# Patient Record
Sex: FEMALE | Race: WHITE | NOT HISPANIC OR LATINO | Employment: OTHER | URBAN - METROPOLITAN AREA
[De-identification: names, ages, dates, MRNs, and addresses within clinical notes are randomized per-mention and may not be internally consistent; named-entity substitution may affect disease eponyms.]

---

## 2017-02-06 RX ORDER — DOXAZOSIN MESYLATE 4 MG/1
TABLET ORAL
Qty: 90 TABLET | Refills: 0 | Status: SHIPPED | OUTPATIENT
Start: 2017-02-06 | End: 2017-05-08 | Stop reason: SDUPTHER

## 2017-02-20 ENCOUNTER — CLINICAL SUPPORT NO REQUIREMENTS (OUTPATIENT)
Dept: CARDIOLOGY | Facility: CLINIC | Age: 82
End: 2017-02-20

## 2017-02-20 DIAGNOSIS — I49.5 SINOATRIAL NODE DYSFUNCTION (HCC): Primary | ICD-10-CM

## 2017-02-20 PROCEDURE — 93280 PM DEVICE PROGR EVAL DUAL: CPT | Performed by: INTERNAL MEDICINE

## 2017-03-07 RX ORDER — OLMESARTAN MEDOXOMIL AND HYDROCHLOROTHIAZIDE 40/25 40; 25 MG/1; MG/1
TABLET ORAL
Qty: 90 TABLET | Refills: 0 | Status: SHIPPED | OUTPATIENT
Start: 2017-03-07 | End: 2017-05-22 | Stop reason: SDUPTHER

## 2017-03-07 RX ORDER — METOPROLOL SUCCINATE 50 MG/1
TABLET, EXTENDED RELEASE ORAL
Qty: 90 TABLET | Refills: 0 | Status: SHIPPED | OUTPATIENT
Start: 2017-03-07 | End: 2017-05-22 | Stop reason: SDUPTHER

## 2017-03-28 ENCOUNTER — OFFICE VISIT (OUTPATIENT)
Dept: INTERNAL MEDICINE | Facility: CLINIC | Age: 82
End: 2017-03-28

## 2017-03-28 VITALS
OXYGEN SATURATION: 97 % | BODY MASS INDEX: 24.39 KG/M2 | HEIGHT: 59 IN | DIASTOLIC BLOOD PRESSURE: 60 MMHG | WEIGHT: 121 LBS | SYSTOLIC BLOOD PRESSURE: 142 MMHG | HEART RATE: 76 BPM

## 2017-03-28 DIAGNOSIS — I34.0 MODERATE MITRAL REGURGITATION: Chronic | ICD-10-CM

## 2017-03-28 DIAGNOSIS — E78.5 HYPERLIPIDEMIA, UNSPECIFIED HYPERLIPIDEMIA TYPE: Chronic | ICD-10-CM

## 2017-03-28 DIAGNOSIS — M85.80 OSTEOPENIA: Chronic | ICD-10-CM

## 2017-03-28 DIAGNOSIS — Z95.0 HISTORY OF PERMANENT CARDIAC PACEMAKER PLACEMENT: Chronic | ICD-10-CM

## 2017-03-28 DIAGNOSIS — I25.10 OCCLUSIVE CORONARY ARTERY DISEASE: Chronic | ICD-10-CM

## 2017-03-28 DIAGNOSIS — M17.0 PRIMARY OSTEOARTHRITIS OF BOTH KNEES: Chronic | ICD-10-CM

## 2017-03-28 DIAGNOSIS — I10 BENIGN ESSENTIAL HYPERTENSION: Chronic | ICD-10-CM

## 2017-03-28 DIAGNOSIS — E03.8 SUBCLINICAL HYPOTHYROIDISM: ICD-10-CM

## 2017-03-28 DIAGNOSIS — Z78.9 STATIN INTOLERANCE: Chronic | ICD-10-CM

## 2017-03-28 DIAGNOSIS — R73.01 IMPAIRED FASTING GLUCOSE: Primary | Chronic | ICD-10-CM

## 2017-03-28 DIAGNOSIS — Z51.81 THERAPEUTIC DRUG MONITORING: ICD-10-CM

## 2017-03-28 DIAGNOSIS — Z86.79 HISTORY OF COMPLETE AV BLOCK: Chronic | ICD-10-CM

## 2017-03-28 DIAGNOSIS — Z98.890 STATUS POST CATHETER ABLATION OF ATRIAL FLUTTER: Chronic | ICD-10-CM

## 2017-03-28 DIAGNOSIS — Z86.79 HISTORY OF ATRIAL FLUTTER: Chronic | ICD-10-CM

## 2017-03-28 DIAGNOSIS — E55.9 VITAMIN D DEFICIENCY: Chronic | ICD-10-CM

## 2017-03-28 DIAGNOSIS — M15.9 GENERALIZED OSTEOARTHRITIS OF MULTIPLE SITES: Chronic | ICD-10-CM

## 2017-03-28 PROBLEM — Z23 ENCOUNTER FOR ZOSTAVAX ADMINISTRATION: Status: ACTIVE | Noted: 2017-03-28

## 2017-03-28 PROCEDURE — 99214 OFFICE O/P EST MOD 30 MIN: CPT | Performed by: INTERNAL MEDICINE

## 2017-03-29 ENCOUNTER — RESULTS ENCOUNTER (OUTPATIENT)
Dept: INTERNAL MEDICINE | Facility: CLINIC | Age: 82
End: 2017-03-29

## 2017-03-29 DIAGNOSIS — E78.5 HYPERLIPIDEMIA, UNSPECIFIED HYPERLIPIDEMIA TYPE: Chronic | ICD-10-CM

## 2017-03-29 DIAGNOSIS — Z51.81 THERAPEUTIC DRUG MONITORING: ICD-10-CM

## 2017-03-29 DIAGNOSIS — R73.01 IMPAIRED FASTING GLUCOSE: Chronic | ICD-10-CM

## 2017-03-29 DIAGNOSIS — E55.9 VITAMIN D DEFICIENCY: Chronic | ICD-10-CM

## 2017-03-29 DIAGNOSIS — E03.8 SUBCLINICAL HYPOTHYROIDISM: ICD-10-CM

## 2017-05-08 RX ORDER — DOXAZOSIN MESYLATE 4 MG/1
TABLET ORAL
Qty: 90 TABLET | Refills: 0 | Status: SHIPPED | OUTPATIENT
Start: 2017-05-08 | End: 2017-07-31 | Stop reason: SDUPTHER

## 2017-05-10 RX ORDER — CLOTRIMAZOLE AND BETAMETHASONE DIPROPIONATE 10; .64 MG/G; MG/G
CREAM TOPICAL
Qty: 45 G | Refills: 1 | Status: SHIPPED | OUTPATIENT
Start: 2017-05-10 | End: 2017-12-11

## 2017-05-22 RX ORDER — METOPROLOL SUCCINATE 50 MG/1
TABLET, EXTENDED RELEASE ORAL
Qty: 90 TABLET | Refills: 0 | Status: SHIPPED | OUTPATIENT
Start: 2017-05-22 | End: 2017-09-05 | Stop reason: SDUPTHER

## 2017-05-22 RX ORDER — OLMESARTAN MEDOXOMIL AND HYDROCHLOROTHIAZIDE 40/25 40; 25 MG/1; MG/1
TABLET ORAL
Qty: 90 TABLET | Refills: 0 | Status: SHIPPED | OUTPATIENT
Start: 2017-05-22 | End: 2017-09-05 | Stop reason: SDUPTHER

## 2017-05-31 ENCOUNTER — CLINICAL SUPPORT NO REQUIREMENTS (OUTPATIENT)
Dept: CARDIOLOGY | Facility: CLINIC | Age: 82
End: 2017-05-31

## 2017-05-31 DIAGNOSIS — I49.5 SINOATRIAL NODE DYSFUNCTION (HCC): Primary | ICD-10-CM

## 2017-05-31 PROCEDURE — 93296 REM INTERROG EVL PM/IDS: CPT | Performed by: INTERNAL MEDICINE

## 2017-05-31 PROCEDURE — 93294 REM INTERROG EVL PM/LDLS PM: CPT | Performed by: INTERNAL MEDICINE

## 2017-07-07 ENCOUNTER — OFFICE VISIT (OUTPATIENT)
Dept: INTERNAL MEDICINE | Facility: CLINIC | Age: 82
End: 2017-07-07

## 2017-07-07 VITALS
SYSTOLIC BLOOD PRESSURE: 134 MMHG | DIASTOLIC BLOOD PRESSURE: 54 MMHG | HEART RATE: 80 BPM | BODY MASS INDEX: 24.23 KG/M2 | HEIGHT: 59 IN | WEIGHT: 120.2 LBS | OXYGEN SATURATION: 96 %

## 2017-07-07 DIAGNOSIS — Z00.00 INITIAL MEDICARE ANNUAL WELLNESS VISIT: Primary | ICD-10-CM

## 2017-07-07 DIAGNOSIS — J06.9 ACUTE UPPER RESPIRATORY INFECTION: ICD-10-CM

## 2017-07-07 PROCEDURE — 99212 OFFICE O/P EST SF 10 MIN: CPT | Performed by: INTERNAL MEDICINE

## 2017-07-07 PROCEDURE — G0438 PPPS, INITIAL VISIT: HCPCS | Performed by: INTERNAL MEDICINE

## 2017-07-07 NOTE — PROGRESS NOTES
07/07/2017    Patient Information  Lora Serrano                                                                                          [id number] [facility] [address]      7/15/1926  [phone]      Chief Complaint:     Follow-up urgent care visit for upper respiratory tract infection.  Initial Medicare wellness visit.    History of Present Illness:    Patient with a history of subclinical hypothyroidism, occlusive coronary artery disease, impaired fasting glucose, osteopenia, hyperlipidemia, hypertension, history of atrial flutter, history of permanent cardiac pacemaker, primary osteoarthritis of both knees.  She presents today to follow-up on an urgent care visit for complaints consistent with a viral upper respiratory tract infection.  This will be described in detail below.  Her symptoms are better.  She also needs her initial Medicare wellness visit.  Past medical history extensively reviewed and updated where necessary including health maintenance parameters.  This reveals patient is up-to-date.    The history regarding recent viral upper respiratory tract infection:    07/07/2017--patient seen in follow-up by Dr. Mix.  She reports she feels much better and her cough is resolved.  However, she still continues to have some phlegm in her throat that is not discolored.  Examination reveals clear lungs.  I have recommended she try Mucinex over-the-counter and drink plenty of fluids.    06/19/2017--patient was evaluated at the urgent care with a 5 day history of malaise, cough, headache, and fatigue.  She took some Cheratussin which provided some relief.  She was diagnosed as having a viral upper respiratory tract infection and prescription for Cheratussin given.    Review of Systems   Constitution: Negative.   HENT: Negative.         Phlegm in throat   Eyes: Negative.    Cardiovascular: Negative.    Respiratory: Negative.    Endocrine: Negative.    Hematologic/Lymphatic: Negative.     Skin: Negative.    Musculoskeletal: Negative.    Gastrointestinal: Negative.    Genitourinary: Negative.    Neurological: Negative.    Psychiatric/Behavioral: Negative.    Allergic/Immunologic: Negative.        Active Problems:    Patient Active Problem List   Diagnosis   • Acute upper respiratory infection   • Occlusive coronary artery disease, 12/11/2000--status post 5 vessel CABG   • Subclinical hypothyroidism   • Impaired fasting glucose   • Onychomycosis of fingernails   • Osteopenia, 01/31/2011--lumbar spine -2.2, left hip -1.0.   • Vitamin D deficiency   • Hyperlipidemia   • Benign essential hypertension   • Generalized osteoarthritis of multiple sites   • Therapeutic drug monitoring   • History of atrial flutter, 2003--successful catheter ablation for persistent atrial flutter.   • History of complete AV block, 2000--dual-chamber PM for SSS, A flutter, AVB after CABG. 2006--PM replacement.  Medtronic EnRhythm [id number]. 11/30/2015--PM generator change.   • Moderate mitral regurgitation, 02/18/2012--moderate MR, EF 58%.   • Status post catheter ablation of atrial flutter, 2002--successful catheter ablation for persistent atrial flutter.   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 2006--PM replacement.  Medtronic EnRhythm [id number]. 11/30/2015--PM generator change.   • Primary osteoarthritis of both knees   • Statin intolerance         Past Medical History:   Diagnosis Date   • History of acute bronchitis 02/09/2015 02/09/2015--patient presents with a 5 or 6 day history of chest congestion, cough productive of yellow/green phlegm that has cleared some over the past day or so. No significant worsening shortness of breath. She has had some chills but no documented fever. Not much in the way of ENT symptoms. Examination reveals mild bilateral rhonchi. No definite areas of consolidation. Levaquin 500 mg by mouth    • History of acute pharyngitis 10/16/2015     10/16/2015--patient presents with approximately 5 day history of sore throat, posterior nasal drainage, cough productive of scant clear phlegm.  No fever, chills, shortness of breath or other systemic signs or symptoms.  She has fatigue but no myalgias.  Examination reveals boggy nasal mucosa.  No tenderness over the sinuses.  Her pharynx is erythematous but there is no exudate.  Neck without nano   • History of anemia 03/11/2015 03/11/2015--CBC is normal with a hemoglobin of 12.2, hematocrit normal at 37.0.   04/25/2014--homocystine and methylmalonic acid are normal. Iron studies are normal, except iron saturation is low at 15%. Observation.   04/14/2014--CBC revealed a low hemoglobin of 11.5, hematocrit low at 34.7. Homocystine, methylmalonic acid, iron studies ordered. Patient is to followup on the phone for the results   • History of atrial flutter, 2003--successful catheter ablation for persistent atrial flutter. 2003 2003--catheter ablation for persistent atrial flutter was successful.   • History of bone density study 01/31/2011 01/31/2011--DEXA scan revealed LS spine T score -2.2, left hip -1.0. Now osteopenia. 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   • History of carotid Doppler/vascular screen. 01/22/2008 01/22/2008--vascular screen was negative for carotid artery plaque, abdominal aorta normal, no PAD.   • History of carpal tunnel syndrome, bilateral 11/19/2009 11/19/2009--nerve conduction study of the upper extremities revealed bilateral carpal tunnel syndrome. January 2010--status post right carpal tunnel release. No surgical treatment on left.   • History of chest x-ray 9/28/2016 09/06/2016--chest x-ray performed by the urgent care for complaints of cough and shortness of breath.  This was compared to previous x-ray 02/15/2014 and is unchanged and reveals no acute disease.   • History of complete AV block, 10/26/2006--permanent pacemaker replacement.   12/18/2000--dual-chamber pacemaker placement for sick sinus syndrome, atrial flutter, AV block after CABG. 12/18/2000 12/18/2000--dual-chamber pacemaker placement for sick sinus syndrome, atrial flutter, AV block that occurred after her bypass surgery. 10/26/2006--status post pacemaker replacement. Medtronic EnRhythm [id number]   • History of drug rash, 01/11/2014--name brand Synthroid 01/11/2014 01/11/2014--patient presented with a diffuse erythematous rash that was very pruritic. Examination consistent with a drug reaction/allergic dermatitis. Believed to be secondary to name brand Synthroid. This was discontinued and patient treated with a Medrol Dosepak.   • History of echocardiogram 01/18/2012 01/18/2012 revealed ejection fraction of 58%, moderate mitral regurgitation.   • History of mammogram 09/10/2003    09/10/2003--normal mammogram.   • History of osteoporosis, June 2001 osteoporosis of the LS spine.  2011--improved to osteopenia only. 2011,2001 01/31/2011--DEXA scan  revealed LS spine T score -2.2, left hip -1.0.  Now osteopenia.   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   • History of pneumococcal vaccination 03/16/2015 03/16/2015--Prevnar 13 given. Patient's initial pneumococcal vaccination was given after the age of 65 and therefore no further pneumococcal immunization is required. 12/14/2007--Pneumovax given. 12/10/2002--pneumococcal vaccination given.   • History of Zostavax administration 1/17/2008 01/17/2008--Zostavax given at the local drug store.   • Questionable History of polymyalgia rheumatica --    This diagnosis somewhat suspect   • Status post catheter ablation of atrial flutter, 2002--successful catheter ablation for persistent atrial flutter. 2003 2003--catheter ablation for persistent atrial flutter was successful.         Past Surgical History:   Procedure Laterality Date   • CARDIAC ABLATION  07/02/2003 07/02/2003--catheter ablation for persistent  atrial flutter was successful.   • CARDIAC PACEMAKER PLACEMENT  12/18/2000 12/18/2000--dual-chamber pacemaker placement for sick sinus syndrome, atrial flutter, AV block that occurred after her bypass surgery.   • CARPAL TUNNEL RELEASE Right 01/2010 January 2010--status post right carpal tunnel release. No surgical treatment on left.   • CORONARY ARTERY BYPASS GRAFT  12/2000 December 2000--coronary artery bypass x 5   • HX OVARIAN CYSTECTOMY  Remote    Remote ovarian cyst excision.   • INCISIONAL BREAST BIOPSY  Remote    Benign   • PACEMAKER REPLACEMENT  10/26/2006    10/26/2006--status post pacemaker replacement. Medtronic EnRhythm [id number]    • PACEMAKER REPLACEMENT  11/30/2015 11/30/2015--pacemaker generator change.         Allergies   Allergen Reactions   • Atorvastatin    • Levothyroxine Sodium    • Other      Seafood   • Penicillins            Current Outpatient Prescriptions:   •  aspirin 81 MG tablet, Take 1 tablet by mouth daily., Disp: , Rfl:   •  Cholecalciferol (VITAMIN D) 1000 UNITS tablet, Take 1 tablet by mouth daily., Disp: , Rfl:   •  clotrimazole-betamethasone (LOTRISONE) 1-0.05 % cream, APPLY SPARINGLY TO AFFECTED AREA(S) THREE TIMES A DAY, Disp: 45 g, Rfl: 1  •  Coenzyme Q10 (COQ10) 400 MG capsule, Take 1 capsule by mouth daily. With food, Disp: , Rfl:   •  doxazosin (CARDURA) 4 MG tablet, TAKE ONE TABLET BY MOUTH EVERY MORNING, Disp: 90 tablet, Rfl: 0  •  metoprolol succinate XL (TOPROL-XL) 50 MG 24 hr tablet, TAKE ONE TABLET BY MOUTH DAILY, Disp: 90 tablet, Rfl: 0  •  olmesartan-hydrochlorothiazide (BENICAR HCT) 40-25 MG per tablet, TAKE ONE TABLET BY MOUTH DAILY FOR BLOOD PRESSURE, Disp: 90 tablet, Rfl: 0  •  Red Yeast Rice 600 MG tablet, Take 2 tablets by mouth every day., Disp: , Rfl:       Family History   Problem Relation Age of Onset   • Hyperlipidemia Mother    • Breast cancer Sister    • Diabetes type II Sister          Social History     Social History   • Marital status:  "     Spouse name: N/A   • Number of children: N/A   • Years of education: N/A     Occupational History   • Retired      Social History Main Topics   • Smoking status: Never Smoker   • Smokeless tobacco: Never Used   • Alcohol use No   • Drug use: No   • Sexual activity: Not Currently     Partners: Male     Other Topics Concern   • Not on file     Social History Narrative         Vitals:    07/07/17 1518   BP: 134/54   Pulse: 80   SpO2: 96%   Weight: 120 lb 3.2 oz (54.5 kg)   Height: 59\" (149.9 cm)          Physical Exam:    General: Alert and oriented x 3.  No acute distress.  Normal affect.  HEENT: Pupils equal, round, reactive to light; extraocular movements intact; sclerae nonicteric; pharynx, ear canals and TMs normal.  Neck: Without JVD, thyromegaly, bruit, or adenopathy.  Lungs: Clear to auscultation in all fields.  Heart: Regular rate and rhythm without murmur, rub, gallop, or click.  Abdomen: Soft, nontender, without hepatosplenomegaly or hernia.  Bowel sounds normal.  : Deferred.  Rectal: Deferred.  Extremities: Without clubbing, cyanosis, edema, or pulse deficit.  Neurologic: Intact without focal deficit.  Normal station and gait observed during ingress and egress from the examination room.  Skin: Without significant lesion.  Musculoskeletal: Unremarkable.      Lab/other results:    I reviewed the evaluation performed at the urgent care.    Assessment/Plan:     Diagnosis Plan   1. Initial Medicare annual wellness visit     2. Acute upper respiratory infection         The initial Medicare wellness visit is documented on a separate note.  Patient presents in follow-up for a history consistent with acute viral upper respiratory tract infection.  Her symptoms are definitely improving although not totally resolved.  Primary complaint is that of phlegm in her throat.    Plan is as follows: I have recommended over-the-counter Mucinex and plenty of fluids.  Patient should follow-up if her symptoms persist " and certainly if they worsen.  Otherwise, she can keep previously scheduled follow-up appointment this fall.          Procedures

## 2017-07-07 NOTE — PROGRESS NOTES
QUICK REFERENCE INFORMATION:  The ABCs of the Annual Wellness Visit    Initial Medicare Wellness Visit    HEALTH RISK ASSESSMENT    7/15/1926    Recent Hospitalizations:  No hospitalization(s) within the last year..        Current Medical Providers:  Patient Care Team:  Adilson Mix MD as PCP - General (Internal Medicine)  Willis Wallace MD as PCP - Claims Attributed        Smoking Status:  History   Smoking Status   • Never Smoker   Smokeless Tobacco   • Never Used       Alcohol Consumption:  History   Alcohol Use No       Depression Screen:   PHQ-9 Depression Screening 7/7/2017   Little interest or pleasure in doing things 0   Feeling down, depressed, or hopeless 0   Trouble falling or staying asleep, or sleeping too much 0   Feeling tired or having little energy 0   Poor appetite or overeating 1   Feeling bad about yourself - or that you are a failure or have let yourself or your family down 0   Trouble concentrating on things, such as reading the newspaper or watching television 0   Moving or speaking so slowly that other people could have noticed. Or the opposite - being so fidgety or restless that you have been moving around a lot more than usual 0   Thoughts that you would be better off dead, or of hurting yourself in some way 0   PHQ-9 Total Score 1   If you checked off any problems, how difficult have these problems made it for you to do your work, take care of things at home, or get along with other people? Not difficult at all       Health Habits and Functional and Cognitive Screening:  Functional & Cognitive Status 7/7/2017   Do you have difficulty preparing food and eating? No   Do you have difficulty bathing yourself? Yes   Do you have difficulty getting dressed? No   Do you have difficulty using the toilet? No   Do you have difficulty moving around from place to place? Yes   In the past year have you fallen or experienced a near fall? No   Do you need help using the phone?  No   Are you deaf or  do you have serious difficulty hearing?  Yes   Do you need help with transportation? Yes   Do you need help shopping? No   Do you need help preparing meals?  No   Do you need help with housework?  No   Do you need help with laundry? No   Do you need help taking your medications? No   Do you need help managing money? No   Do you have difficulty concentrating, remembering or making decisions? No       Health Habits  Current Diet: Well Balanced Diet  Dental Exam: Not up to date  Eye Exam: Not up to date  Exercise (times per week): 7 times per week  Current Exercise Activities Include: Walking          Does the patient have evidence of cognitive impairment? No    Asiprin use counseling: Taking ASA appropriately as indicated      Recent Lab Results:    Visual Acuity:  No exam data present    Age-appropriate Screening Schedule:  Refer to the list below for future screening recommendations based on patient's age, sex and/or medical conditions. Orders for these recommended tests are listed in the plan section. The patient has been provided with a written plan.    Health Maintenance   Topic Date Due   • INFLUENZA VACCINE  08/01/2017   • LIPID PANEL  03/21/2018   • MAMMOGRAM  03/28/2019   • DXA SCAN  03/28/2019   • TDAP/TD VACCINES (2 - Td) 03/28/2027   • PNEUMOCOCCAL VACCINES (65+ LOW/MEDIUM RISK)  Completed   • ZOSTER VACCINE  Completed        Subjective   History of Present Illness    Lora Serrano is a 89 y.o. female who presents for an Annual Wellness Visit.    The following portions of the patient's history were reviewed and updated as appropriate: allergies, current medications, past family history, past medical history, past social history, past surgical history and problem list.    Outpatient Medications Prior to Visit   Medication Sig Dispense Refill   • aspirin 81 MG tablet Take 1 tablet by mouth daily.     • Cholecalciferol (VITAMIN D) 1000 UNITS tablet Take 1 tablet by mouth daily.     •  clotrimazole-betamethasone (LOTRISONE) 1-0.05 % cream APPLY SPARINGLY TO AFFECTED AREA(S) THREE TIMES A DAY 45 g 1   • Coenzyme Q10 (COQ10) 400 MG capsule Take 1 capsule by mouth daily. With food     • doxazosin (CARDURA) 4 MG tablet TAKE ONE TABLET BY MOUTH EVERY MORNING 90 tablet 0   • metoprolol succinate XL (TOPROL-XL) 50 MG 24 hr tablet TAKE ONE TABLET BY MOUTH DAILY 90 tablet 0   • olmesartan-hydrochlorothiazide (BENICAR HCT) 40-25 MG per tablet TAKE ONE TABLET BY MOUTH DAILY FOR BLOOD PRESSURE 90 tablet 0   • Red Yeast Rice 600 MG tablet Take 2 tablets by mouth every day.     • guaifenesin-codeine (CHERATUSSIN AC) 100-10 MG/5ML liquid 10 ml po Q4H prn cough 118 mL 1     No facility-administered medications prior to visit.        Patient Active Problem List   Diagnosis   • Acute upper respiratory infection   • Occlusive coronary artery disease, 12/11/2000--status post 5 vessel CABG   • Subclinical hypothyroidism   • Impaired fasting glucose   • Onychomycosis of fingernails   • Osteopenia, 01/31/2011--lumbar spine -2.2, left hip -1.0.   • Vitamin D deficiency   • Hyperlipidemia   • Benign essential hypertension   • Generalized osteoarthritis of multiple sites   • Therapeutic drug monitoring   • History of atrial flutter, 2003--successful catheter ablation for persistent atrial flutter.   • History of complete AV block, 2000--dual-chamber PM for SSS, A flutter, AVB after CABG. 2006--PM replacement.  Medtronic EnRhythm [id number]. 11/30/2015--PM generator change.   • Moderate mitral regurgitation, 02/18/2012--moderate MR, EF 58%.   • Status post catheter ablation of atrial flutter, 2002--successful catheter ablation for persistent atrial flutter.   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 2006--PM replacement.  Medtronic EnRhythm [id number]. 11/30/2015--PM generator change.   • Primary osteoarthritis of both knees   • Statin intolerance       Advance Care  "Planning:  has NO advance directive - not interested in additional information    Identification of Risk Factors:  Risk factors include: cardiovascular risk.    Review of Systems   Musculoskeletal: Positive for arthralgias.   All other systems reviewed and are negative.      Compared to one year ago, the patient feels her physical health is the same.  Compared to one year ago, the patient feels her mental health is the same.    Objective       Physical Exam    General: Alert and oriented x 3.  No acute distress.  Normal affect.  HEENT: Pupils equal, round, reactive to light; extraocular movements intact; sclerae nonicteric; pharynx, ear canals and TMs normal.  Neck: Without JVD, thyromegaly, bruit, or adenopathy.  Lungs: Clear to auscultation in all fields.  Heart: Regular rate and rhythm without murmur, rub, gallop, or click.  Abdomen: Soft, nontender, without hepatosplenomegaly or hernia.  Bowel sounds normal.  : Deferred.  Rectal: Deferred.  Extremities: Without clubbing, cyanosis, edema, or pulse deficit.  Neurologic: Intact without focal deficit.  Normal station and gait observed during ingress and egress from the examination room.  Skin: Without significant lesion.  Musculoskeletal: Unremarkable.    Vitals:    07/07/17 1518   BP: 134/54   Pulse: 80   SpO2: 96%   Weight: 120 lb 3.2 oz (54.5 kg)   Height: 59\" (149.9 cm)   PainSc: 0-No pain       Body mass index is 24.28 kg/(m^2).  Discussed the patient's BMI with her. The BMI is in the acceptable range.    Assessment/Plan   Patient Self-Management and Personalized Health Advice  The patient has been provided with information about: exercise and fall prevention and preventive services including:   · Exercise counseling provided, Fall Risk assessment done, Glaucoma screening recommended.    Visit Diagnoses:    ICD-10-CM ICD-9-CM   1. Initial Medicare annual wellness visit Z00.00 V70.0   2. Acute upper respiratory infection J06.9 465.9       No orders of the " defined types were placed in this encounter.      Outpatient Encounter Prescriptions as of 7/7/2017   Medication Sig Dispense Refill   • aspirin 81 MG tablet Take 1 tablet by mouth daily.     • Cholecalciferol (VITAMIN D) 1000 UNITS tablet Take 1 tablet by mouth daily.     • clotrimazole-betamethasone (LOTRISONE) 1-0.05 % cream APPLY SPARINGLY TO AFFECTED AREA(S) THREE TIMES A DAY 45 g 1   • Coenzyme Q10 (COQ10) 400 MG capsule Take 1 capsule by mouth daily. With food     • doxazosin (CARDURA) 4 MG tablet TAKE ONE TABLET BY MOUTH EVERY MORNING 90 tablet 0   • metoprolol succinate XL (TOPROL-XL) 50 MG 24 hr tablet TAKE ONE TABLET BY MOUTH DAILY 90 tablet 0   • olmesartan-hydrochlorothiazide (BENICAR HCT) 40-25 MG per tablet TAKE ONE TABLET BY MOUTH DAILY FOR BLOOD PRESSURE 90 tablet 0   • Red Yeast Rice 600 MG tablet Take 2 tablets by mouth every day.     • [DISCONTINUED] guaifenesin-codeine (CHERATUSSIN AC) 100-10 MG/5ML liquid 10 ml po Q4H prn cough 118 mL 1     No facility-administered encounter medications on file as of 7/7/2017.        Reviewed use of high risk medication in the elderly: yes  Reviewed for potential of harmful drug interactions in the elderly: yes    Follow Up:  No Follow-up on file.     An After Visit Summary and PPPS with all of these plans were given to the patient.

## 2017-07-31 RX ORDER — DOXAZOSIN MESYLATE 4 MG/1
TABLET ORAL
Qty: 90 TABLET | Refills: 0 | Status: SHIPPED | OUTPATIENT
Start: 2017-07-31 | End: 2017-11-04 | Stop reason: SDUPTHER

## 2017-09-05 RX ORDER — OLMESARTAN MEDOXOMIL AND HYDROCHLOROTHIAZIDE 40/25 40; 25 MG/1; MG/1
TABLET ORAL
Qty: 90 TABLET | Refills: 0 | Status: SHIPPED | OUTPATIENT
Start: 2017-09-05 | End: 2017-11-28 | Stop reason: SDUPTHER

## 2017-09-05 RX ORDER — METOPROLOL SUCCINATE 50 MG/1
TABLET, EXTENDED RELEASE ORAL
Qty: 90 TABLET | Refills: 0 | Status: SHIPPED | OUTPATIENT
Start: 2017-09-05 | End: 2017-11-28 | Stop reason: SDUPTHER

## 2017-09-08 ENCOUNTER — CLINICAL SUPPORT NO REQUIREMENTS (OUTPATIENT)
Dept: CARDIOLOGY | Facility: CLINIC | Age: 82
End: 2017-09-08

## 2017-09-08 DIAGNOSIS — I49.5 SICK SINUS SYNDROME (HCC): Primary | ICD-10-CM

## 2017-09-08 PROCEDURE — 93280 PM DEVICE PROGR EVAL DUAL: CPT | Performed by: INTERNAL MEDICINE

## 2017-10-28 LAB
25(OH)D3+25(OH)D2 SERPL-MCNC: 55.4 NG/ML (ref 30–100)
ALBUMIN SERPL-MCNC: 4.2 G/DL (ref 3.5–5.2)
ALBUMIN/GLOB SERPL: 1.8 G/DL
ALP SERPL-CCNC: 71 U/L (ref 39–117)
ALT SERPL-CCNC: 15 U/L (ref 1–33)
AST SERPL-CCNC: 19 U/L (ref 1–32)
BILIRUB SERPL-MCNC: 0.4 MG/DL (ref 0.1–1.2)
BUN SERPL-MCNC: 12 MG/DL (ref 8–23)
BUN/CREAT SERPL: 15.4 (ref 7–25)
CALCIUM SERPL-MCNC: 9.5 MG/DL (ref 8.2–9.6)
CHLORIDE SERPL-SCNC: 94 MMOL/L (ref 98–107)
CHOLEST SERPL-MCNC: 222 MG/DL (ref 100–199)
CO2 SERPL-SCNC: 26.9 MMOL/L (ref 22–29)
CREAT SERPL-MCNC: 0.78 MG/DL (ref 0.57–1)
ERYTHROCYTE [DISTWIDTH] IN BLOOD BY AUTOMATED COUNT: 14 % (ref 11.7–13)
GFR SERPLBLD CREATININE-BSD FMLA CKD-EPI: 69 ML/MIN/1.73
GFR SERPLBLD CREATININE-BSD FMLA CKD-EPI: 84 ML/MIN/1.73
GLOBULIN SER CALC-MCNC: 2.3 GM/DL
GLUCOSE SERPL-MCNC: 95 MG/DL (ref 65–99)
HBA1C MFR BLD: 5.57 % (ref 4.8–5.6)
HCT VFR BLD AUTO: 38.1 % (ref 35.6–45.5)
HDL SERPL-SCNC: 30.8 UMOL/L
HDLC SERPL-MCNC: 53 MG/DL
HGB BLD-MCNC: 12.2 G/DL (ref 11.9–15.5)
LDL SERPL QN: 21.2 NM
LDL SERPL-SCNC: 1852 NMOL/L
LDL SMALL SERPL-SCNC: 780 NMOL/L
LDLC SERPL CALC-MCNC: 146 MG/DL (ref 0–99)
MCH RBC QN AUTO: 28.6 PG (ref 26.9–32)
MCHC RBC AUTO-ENTMCNC: 32 G/DL (ref 32.4–36.3)
MCV RBC AUTO: 89.2 FL (ref 80.5–98.2)
PLATELET # BLD AUTO: 200 10*3/MM3 (ref 140–500)
POTASSIUM SERPL-SCNC: 4.3 MMOL/L (ref 3.5–5.2)
PROT SERPL-MCNC: 6.5 G/DL (ref 6–8.5)
RBC # BLD AUTO: 4.27 10*6/MM3 (ref 3.9–5.2)
SODIUM SERPL-SCNC: 133 MMOL/L (ref 136–145)
T3FREE SERPL-MCNC: 3.1 PG/ML (ref 2–4.4)
T4 FREE SERPL-MCNC: 1.35 NG/DL (ref 0.93–1.7)
TRIGL SERPL-MCNC: 117 MG/DL (ref 0–149)
TSH SERPL DL<=0.005 MIU/L-ACNC: 2.38 MIU/ML (ref 0.27–4.2)
WBC # BLD AUTO: 5.65 10*3/MM3 (ref 4.5–10.7)

## 2017-11-02 ENCOUNTER — OFFICE VISIT (OUTPATIENT)
Dept: INTERNAL MEDICINE | Facility: CLINIC | Age: 82
End: 2017-11-02

## 2017-11-02 VITALS
HEART RATE: 74 BPM | WEIGHT: 115 LBS | OXYGEN SATURATION: 98 % | BODY MASS INDEX: 23.18 KG/M2 | SYSTOLIC BLOOD PRESSURE: 136 MMHG | HEIGHT: 59 IN | DIASTOLIC BLOOD PRESSURE: 54 MMHG

## 2017-11-02 DIAGNOSIS — Z23 NEED FOR INFLUENZA VACCINATION: ICD-10-CM

## 2017-11-02 DIAGNOSIS — Z86.79 HISTORY OF COMPLETE AV BLOCK: Chronic | ICD-10-CM

## 2017-11-02 DIAGNOSIS — Z98.890 STATUS POST CATHETER ABLATION OF ATRIAL FLUTTER: Chronic | ICD-10-CM

## 2017-11-02 DIAGNOSIS — R73.01 IMPAIRED FASTING GLUCOSE: Primary | Chronic | ICD-10-CM

## 2017-11-02 DIAGNOSIS — I25.10 OCCLUSIVE CORONARY ARTERY DISEASE: Chronic | ICD-10-CM

## 2017-11-02 DIAGNOSIS — I10 BENIGN ESSENTIAL HYPERTENSION: Chronic | ICD-10-CM

## 2017-11-02 DIAGNOSIS — Z78.9 STATIN INTOLERANCE: Chronic | ICD-10-CM

## 2017-11-02 DIAGNOSIS — E78.5 HYPERLIPIDEMIA, UNSPECIFIED HYPERLIPIDEMIA TYPE: Chronic | ICD-10-CM

## 2017-11-02 DIAGNOSIS — E55.9 VITAMIN D DEFICIENCY: Chronic | ICD-10-CM

## 2017-11-02 DIAGNOSIS — M85.89 OSTEOPENIA OF MULTIPLE SITES: Chronic | ICD-10-CM

## 2017-11-02 DIAGNOSIS — Z51.81 THERAPEUTIC DRUG MONITORING: ICD-10-CM

## 2017-11-02 DIAGNOSIS — Z86.79 HISTORY OF ATRIAL FLUTTER: Chronic | ICD-10-CM

## 2017-11-02 DIAGNOSIS — M17.0 PRIMARY OSTEOARTHRITIS OF BOTH KNEES: Chronic | ICD-10-CM

## 2017-11-02 DIAGNOSIS — E03.8 SUBCLINICAL HYPOTHYROIDISM: Chronic | ICD-10-CM

## 2017-11-02 PROCEDURE — G0008 ADMIN INFLUENZA VIRUS VAC: HCPCS | Performed by: INTERNAL MEDICINE

## 2017-11-02 PROCEDURE — 90662 IIV NO PRSV INCREASED AG IM: CPT | Performed by: INTERNAL MEDICINE

## 2017-11-02 PROCEDURE — 99214 OFFICE O/P EST MOD 30 MIN: CPT | Performed by: INTERNAL MEDICINE

## 2017-11-02 RX ORDER — METRONIDAZOLE 7.5 MG/G
GEL TOPICAL
COMMUNITY
Start: 2017-08-15 | End: 2017-12-11

## 2017-11-02 NOTE — PROGRESS NOTES
11/02/2017    Patient Information  Lora Serrano                                                                                          [id number] [id number]      7/15/1926  [phone]      Chief Complaint:     Follow-up impaired fasting glucose, subclinical hypothyroidism, coronary artery disease, osteopenia, hyperlipidemia, hypertension, vitamin D deficiency, history of atrial flutter, status post catheter ablation and subsequent complete AV block requiring pacemaker placement, primary osteoarthritis of the knees, history of statin intolerance.  No new acute complaints.    History of Present Illness:    Patient with a history of medical problems as outlined in the chief complaint that have been fairly stable now for at least the past year.  She presents today to follow-up with lab prior in order to monitor her chronic medical issues.  She seems to be tolerating her medications well although she does have a history of statin intolerance but is able to tolerate red yeast rice.  Past medical history reviewed and updated where necessary including health maintenance parameters.  This reveals she will be up-to-date after today's influenza vaccination.    Review of Systems   Constitution: Negative.   HENT: Negative.    Eyes: Negative.    Cardiovascular: Negative.    Respiratory: Negative.    Endocrine: Negative.    Hematologic/Lymphatic: Negative.    Skin: Negative.    Musculoskeletal: Negative.    Gastrointestinal: Negative.    Genitourinary: Negative.    Neurological: Negative.    Psychiatric/Behavioral: Negative.    Allergic/Immunologic: Negative.        Active Problems:    Patient Active Problem List   Diagnosis   • Occlusive coronary artery disease, 12/11/2000--status post 5 vessel CABG   • Subclinical hypothyroidism   • Impaired fasting glucose   • Onychomycosis of fingernails   • Osteopenia, 01/31/2011--lumbar spine -2.2, left hip -1.0.   • Vitamin D deficiency   •  Hyperlipidemia   • Benign essential hypertension   • Generalized osteoarthritis of multiple sites   • Therapeutic drug monitoring   • History of atrial flutter, 2003--successful catheter ablation for persistent atrial flutter.   • History of complete AV block, 2000--dual-chamber PM for SSS, A flutter, AVB after CABG. 2006--PM replacement.  Medtronic EnRhythm [id number]. 11/30/2015--PM generator change.   • Moderate mitral regurgitation, 02/18/2012--moderate MR, EF 58%.   • Status post catheter ablation of atrial flutter, 2002--successful catheter ablation for persistent atrial flutter.   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 2006--PM replacement.  Medtronic EnRhythm [id number]. 11/30/2015--PM generator change.   • Primary osteoarthritis of both knees   • Statin intolerance         Past Medical History:   Diagnosis Date   • Benign essential hypertension 3/14/2016   • Generalized osteoarthritis of multiple sites 3/14/2016   • History of acute bronchitis 02/09/2015 02/09/2015--patient presents with a 5 or 6 day history of chest congestion, cough productive of yellow/green phlegm that has cleared some over the past day or so. No significant worsening shortness of breath. She has had some chills but no documented fever. Not much in the way of ENT symptoms. Examination reveals mild bilateral rhonchi. No definite areas of consolidation. Levaquin 500 mg by mouth    • History of acute pharyngitis 10/16/2015    10/16/2015--patient presents with approximately 5 day history of sore throat, posterior nasal drainage, cough productive of scant clear phlegm.  No fever, chills, shortness of breath or other systemic signs or symptoms.  She has fatigue but no myalgias.  Examination reveals boggy nasal mucosa.  No tenderness over the sinuses.  Her pharynx is erythematous but there is no exudate.  Neck without nano   • History of Acute upper respiratory infection 3/14/2016     07/07/2017--patient seen in follow-up by Dr. Mix.  She reports she feels much better and her cough is resolved.  However, she still continues to have some phlegm in her throat that is not discolored.  Examination reveals clear lungs.  I have recommended she try Mucinex over-the-counter and drink plenty of fluids.  06/19/2017--patient was evaluated at the urgent care with a 5 day history of malaise, cough, headache, and fatigue.  She took some Cheratussin which provided some relief.  She was diagnosed as having a viral upper respiratory tract infection and prescription for Cheratussin given.  10/16/2015--patient presents with approximately 5 day history of sore throat, posterior nasal drainage, cough productive of scant clear phlegm.  No fever, chills, shortness of breath or other systemic signs or symptoms.  She has fatigue but no myalgias.  Examination reveals boggy nasal mucosa.  No tenderness over the sinuses.  Her pharynx is erythematous but there is no exudate.  Neck without adenopathy.  Lung exami   • History of anemia 03/11/2015 03/11/2015--CBC is normal with a hemoglobin of 12.2, hematocrit normal at 37.0.   04/25/2014--homocystine and methylmalonic acid are normal. Iron studies are normal, except iron saturation is low at 15%. Observation.   04/14/2014--CBC revealed a low hemoglobin of 11.5, hematocrit low at 34.7. Homocystine, methylmalonic acid, iron studies ordered. Patient is to followup on the phone for the results   • History of atrial flutter, 2003--successful catheter ablation for persistent atrial flutter. 2003 2003--catheter ablation for persistent atrial flutter was successful.   • History of bone density study 01/31/2011 01/31/2011--DEXA scan revealed LS spine T score -2.2, left hip -1.0. Now osteopenia. 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   • History of carotid Doppler/vascular screen. 01/22/2008 01/22/2008--vascular screen was negative for carotid artery plaque,  abdominal aorta normal, no PAD.   • History of carpal tunnel syndrome, bilateral 11/19/2009 11/19/2009--nerve conduction study of the upper extremities revealed bilateral carpal tunnel syndrome. January 2010--status post right carpal tunnel release. No surgical treatment on left.   • History of chest x-ray 9/28/2016 09/06/2016--chest x-ray performed by the urgent care for complaints of cough and shortness of breath.  This was compared to previous x-ray 02/15/2014 and is unchanged and reveals no acute disease.   • History of complete AV block, 10/26/2006--permanent pacemaker replacement.  12/18/2000--dual-chamber pacemaker placement for sick sinus syndrome, atrial flutter, AV block after CABG. 12/18/2000 12/18/2000--dual-chamber pacemaker placement for sick sinus syndrome, atrial flutter, AV block that occurred after her bypass surgery. 10/26/2006--status post pacemaker replacement. Medtronic EnRhythm [id number]   • History of drug rash, 01/11/2014--name brand Synthroid 01/11/2014 01/11/2014--patient presented with a diffuse erythematous rash that was very pruritic. Examination consistent with a drug reaction/allergic dermatitis. Believed to be secondary to name brand Synthroid. This was discontinued and patient treated with a Medrol Dosepak.   • History of echocardiogram 01/18/2012 01/18/2012 revealed ejection fraction of 58%, moderate mitral regurgitation.   • History of mammogram 09/10/2003    09/10/2003--normal mammogram.   • History of osteoporosis, June 2001 osteoporosis of the LS spine.  2011--improved to osteopenia only. 2011,2001 01/31/2011--DEXA scan  revealed LS spine T score -2.2, left hip -1.0.  Now osteopenia.   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 2006--PM replacement.  Medtronic EnRhythm [id number]. 11/30/2015--PM generator change. 11/30/2015 11/30/2015--pacemaker generator  change.  10/26/2006--status post pacemaker replacement. Medtronic EnRhythm [id number]  12/18/2000--dual-chamber pacemaker placement for sick sinus syndrome, atrial flutter, AV block that occurred after her bypass surgery.   2000--dual-chamber PM for SSS, A flutter, AVB after CABG. 2006--PM replacement.  Medtronic EnRhythm [id number]. 11/30/2015--PM generator change.   • History of pneumococcal vaccination 03/16/2015 03/16/2015--Prevnar 13 given. Patient's initial pneumococcal vaccination was given after the age of 65 and therefore no further pneumococcal immunization is required. 12/14/2007--Pneumovax given. 12/10/2002--pneumococcal vaccination given.   • History of Zostavax administration 1/17/2008 01/17/2008--Zostavax given at the local drug store.   • Hyperlipidemia 3/14/2016   • Impaired fasting glucose 3/16/2016    03/16/2015--type 2 diabetes is now evolved into impaired fasting glucose.  Hemoglobin A1c 5.7.  Diagnosed January 2010   • Moderate mitral regurgitation, 02/18/2012--moderate MR, EF 58%. 1/18/2012 01/18/2012 revealed ejection fraction of 58%, moderate mitral regurgitation.   • Occlusive coronary artery disease, 12/11/2000--status post 5 vessel CABG 12/11/2000 12/11/2000--status post 5 vessel CABG.  Patient subsequently had chest pain from her sternal wires which were removed.   • Onychomycosis of fingernails 3/14/2016    Patient has had onychomycosis involving her fingernails for several years.  She tries to control it with Ertaczo cream.   • Osteopenia, 01/31/2011--lumbar spine -2.2, left hip -1.0. 6/1/2001 01/31/2011--DEXA scan  revealed LS spine T score -2.2, left hip -1.0.  Now osteopenia.   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   • Primary osteoarthritis of both knees 9/28/2016 09/28/2016--patient seen in follow-up and continues to have bilateral knee discomfort particularly with prolonged use.  Right knee is worse than the left.  She understandably does not want have  a knee replacement but I think she would benefit from orthopedic evaluation for consideration of cortisone or visco supplementation.  Patient referred to Dr. Wallace.  08/26/2016--x-ray of the right knee reveals very severe degenerative changes involving the medial compartment with marked joint space narrowing and subchondral sclerosis and marginal spurring.  No joint effusion and no acute pathology.   • Questionable History of polymyalgia rheumatica --    This diagnosis somewhat suspect   • Statin intolerance 3/28/2017    Intolerance to multiple statins.   • Status post catheter ablation of atrial flutter, 2002--successful catheter ablation for persistent atrial flutter. 2003 2003--catheter ablation for persistent atrial flutter was successful.   • Subclinical hypothyroidism 5/9/2013    Diagnosed 05/16/2013.   05/09/2013--ultrasound thyroid for new hypothyroidism revealed a small thyroid gland consistent with hypothyroidism.  Mixed nodule present.   • Vitamin D deficiency 3/14/2016         Past Surgical History:   Procedure Laterality Date   • CARDIAC ABLATION  07/02/2003 07/02/2003--catheter ablation for persistent atrial flutter was successful.   • CARDIAC PACEMAKER PLACEMENT  12/18/2000 12/18/2000--dual-chamber pacemaker placement for sick sinus syndrome, atrial flutter, AV block that occurred after her bypass surgery.   • CARPAL TUNNEL RELEASE Right 01/2010 January 2010--status post right carpal tunnel release. No surgical treatment on left.   • CORONARY ARTERY BYPASS GRAFT  12/2000 December 2000--coronary artery bypass x 5   • HX OVARIAN CYSTECTOMY  Remote    Remote ovarian cyst excision.   • INCISIONAL BREAST BIOPSY  Remote    Benign   • PACEMAKER REPLACEMENT  10/26/2006    10/26/2006--status post pacemaker replacement. Medtronic EnRhythm [id number]    • PACEMAKER REPLACEMENT  11/30/2015 11/30/2015--pacemaker generator change.         Allergies   Allergen Reactions   • Atorvastatin    •  "Levothyroxine Sodium    • Other      Seafood   • Penicillins            Current Outpatient Prescriptions:   •  aspirin 81 MG tablet, Take 1 tablet by mouth daily., Disp: , Rfl:   •  Cholecalciferol (VITAMIN D) 1000 UNITS tablet, Take 1 tablet by mouth daily., Disp: , Rfl:   •  clotrimazole-betamethasone (LOTRISONE) 1-0.05 % cream, APPLY SPARINGLY TO AFFECTED AREA(S) THREE TIMES A DAY, Disp: 45 g, Rfl: 1  •  Coenzyme Q10 (COQ10) 400 MG capsule, Take 1 capsule by mouth daily. With food, Disp: , Rfl:   •  doxazosin (CARDURA) 4 MG tablet, TAKE ONE TABLET BY MOUTH EVERY MORNING, Disp: 90 tablet, Rfl: 0  •  metoprolol succinate XL (TOPROL-XL) 50 MG 24 hr tablet, TAKE ONE TABLET BY MOUTH DAILY, Disp: 90 tablet, Rfl: 0  •  metroNIDAZOLE (METROGEL) 0.75 % gel, , Disp: , Rfl:   •  olmesartan-hydrochlorothiazide (BENICAR HCT) 40-25 MG per tablet, TAKE ONE TABLET BY MOUTH DAILY FOR BLOOD PRESSURE, Disp: 90 tablet, Rfl: 0  •  Red Yeast Rice 600 MG tablet, Take 2 tablets by mouth every day., Disp: , Rfl:       Family History   Problem Relation Age of Onset   • Hyperlipidemia Mother    • Breast cancer Sister    • Diabetes type II Sister          Social History     Social History   • Marital status:      Spouse name: N/A   • Number of children: N/A   • Years of education: N/A     Occupational History   • Retired      Social History Main Topics   • Smoking status: Never Smoker   • Smokeless tobacco: Never Used   • Alcohol use No   • Drug use: No   • Sexual activity: Not Currently     Partners: Male     Other Topics Concern   • Not on file     Social History Narrative         Vitals:    11/02/17 1322   BP: 136/54   Pulse: 74   SpO2: 98%   Weight: 115 lb (52.2 kg)   Height: 59\" (149.9 cm)          Physical Exam:    General: Alert and oriented x 3.  No acute distress.  Normal affect.  HEENT: Pupils equal, round, reactive to light; extraocular movements intact; sclerae nonicteric; pharynx, ear canals and TMs normal.  Neck: " Without JVD, thyromegaly, bruit, or adenopathy.  Lungs: Clear to auscultation in all fields.  Heart: Regular rate and rhythm without murmur, rub, gallop, or click.  Abdomen: Soft, nontender, without hepatosplenomegaly or hernia.  Bowel sounds normal.  : Deferred.  Rectal: Deferred.  Extremities: Without clubbing, cyanosis, edema, or pulse deficit.  Neurologic: Intact without focal deficit.  Normal station and gait observed during ingress and egress from the examination room.  Skin: Without significant lesion.  Musculoskeletal: Unremarkable.      Lab/other results:    NMR reveals total cholesterol 222.  Triglycerides 117.  LDL particle number elevated at 1852.  Small LDL particle number elevated at 780.  HDL particle number normal at 30.8.  CMP normal except sodium slightly low at 133.  CBC is essentially normal.  Hemoglobin A1c 5.57.  Thyroid function tests normal.  Vitamin D normal.    Assessment/Plan:     Diagnosis Plan   1. Impaired fasting glucose     2. Subclinical hypothyroidism     3. Occlusive coronary artery disease, 12/11/2000--status post 5 vessel CABG     4. Osteopenia of multiple sites     5. Hyperlipidemia, unspecified hyperlipidemia type     6. Benign essential hypertension     7. Vitamin D deficiency     8. History of atrial flutter, 2003--successful catheter ablation for persistent atrial flutter.     9. History of complete AV block, 2000--dual-chamber PM for SSS, A flutter, AVB after CABG. 2006--PM replacement.  Medtronic EnRhythm [id number]. 11/30/2015--PM generator change.     10. Status post catheter ablation of atrial flutter, 2002--successful catheter ablation for persistent atrial flutter.     11. Primary osteoarthritis of both knees     12. Statin intolerance       Patient has very mild impaired fasting glucose at does not require medication.  The diagnosis of subclinical hypothyroidism is somewhat suspect that we will continue to monitor.  Patient has stable coronary artery disease.  She  also has osteopenia and prefers not to have any further DEXA scans which I do not think is unreasonable given the overall clinical picture.  Blood pressure seems well controlled.  Vitamin D therapeutic.  She has a history of atrial flutter and complete AV block as noted.  She has a pacemaker and this is monitored by the cardiology service on a regular basis.  She suffers from primary osteoarthritis of the knees and periodically sees the orthopedic surgeon and receives injections.  This does seem to help temporarily.    Plan is as follows: High-dose influenza vaccination given.  No change in current medical regimen.  Patient will follow-up after 07/07/2018 with lab prior.  We will do her subsequent Medicare wellness visit at that time.  Otherwise, she can follow-up as needed.        Procedures

## 2017-11-06 RX ORDER — DOXAZOSIN MESYLATE 4 MG/1
TABLET ORAL
Qty: 90 TABLET | Refills: 0 | Status: SHIPPED | OUTPATIENT
Start: 2017-11-06 | End: 2018-02-02 | Stop reason: SDUPTHER

## 2017-11-28 RX ORDER — OLMESARTAN MEDOXOMIL AND HYDROCHLOROTHIAZIDE 40/25 40; 25 MG/1; MG/1
TABLET ORAL
Qty: 90 TABLET | Refills: 1 | Status: SHIPPED | OUTPATIENT
Start: 2017-11-28 | End: 2018-06-04 | Stop reason: SDUPTHER

## 2017-11-28 RX ORDER — METOPROLOL SUCCINATE 50 MG/1
TABLET, EXTENDED RELEASE ORAL
Qty: 90 TABLET | Refills: 1 | Status: SHIPPED | OUTPATIENT
Start: 2017-11-28 | End: 2018-06-04 | Stop reason: SDUPTHER

## 2017-12-11 ENCOUNTER — OFFICE VISIT (OUTPATIENT)
Dept: CARDIOLOGY | Facility: CLINIC | Age: 82
End: 2017-12-11

## 2017-12-11 VITALS
WEIGHT: 116 LBS | SYSTOLIC BLOOD PRESSURE: 140 MMHG | BODY MASS INDEX: 23.39 KG/M2 | HEIGHT: 59 IN | HEART RATE: 73 BPM | DIASTOLIC BLOOD PRESSURE: 60 MMHG

## 2017-12-11 DIAGNOSIS — Z86.79 HISTORY OF ATRIAL FLUTTER: Chronic | ICD-10-CM

## 2017-12-11 DIAGNOSIS — Z86.79 HISTORY OF COMPLETE AV BLOCK: Primary | Chronic | ICD-10-CM

## 2017-12-11 DIAGNOSIS — I25.10 OCCLUSIVE CORONARY ARTERY DISEASE: Chronic | ICD-10-CM

## 2017-12-11 DIAGNOSIS — I10 BENIGN ESSENTIAL HYPERTENSION: Chronic | ICD-10-CM

## 2017-12-11 PROCEDURE — 93000 ELECTROCARDIOGRAM COMPLETE: CPT | Performed by: NURSE PRACTITIONER

## 2017-12-11 PROCEDURE — 99214 OFFICE O/P EST MOD 30 MIN: CPT | Performed by: NURSE PRACTITIONER

## 2017-12-11 NOTE — PROGRESS NOTES
Date of Office Visit: 2017  Encounter Provider: WATSON Noonan  Place of Service: [facility] CARDIOLOGY  Patient Name: Lora Serrano  :7/15/1926    Chief Complaint   Patient presents with   • Pacemaker Check   • Coronary Artery Disease   • Slow Heart Rate   :     HPI: Lora Serrano is a 89 y.o. female who is a patient of Dr. Fontanez's, new to me today. Old records have been reviewed. She has a past medical history of CAD, s/p CABG (), atrial flutter, s/p ablation () , AV block after CABG, s/p PPM () and HTN. She was last seen by Dr. Fontanez in 2016. She presents today for routine pacemaker check and follow up.    She is doing well. She denies chest pain, shortness of breath, PND, orthopnea, dizziness, palpitations or syncope. She has been a little congested, mostly in her head. She had not had any fever or chills. She also  Notes that she bruises easily which she attributes to being on aspirin. She had not had any bleeding issues.     Device interrogation today shows normal pacemaker function and testing.  She is pacer dependent with no R wave oat VVI 30.  Her diagnostics show that she A paces 62% of the time and V paces 99.9% of the time.  She has had no episodes since her last interrogation.        Past Medical History:   Diagnosis Date   • Benign essential hypertension 3/14/2016   • Generalized osteoarthritis of multiple sites 3/14/2016   • History of acute bronchitis 2015--patient presents with a 5 or 6 day history of chest congestion, cough productive of yellow/green phlegm that has cleared some over the past day or so. No significant worsening shortness of breath. She has had some chills but no documented fever. Not much in the way of ENT symptoms. Examination reveals mild bilateral rhonchi. No definite areas of consolidation. Levaquin 500 mg by mouth    • History of acute pharyngitis 10/16/2015    10/16/2015--patient presents with  approximately 5 day history of sore throat, posterior nasal drainage, cough productive of scant clear phlegm.  No fever, chills, shortness of breath or other systemic signs or symptoms.  She has fatigue but no myalgias.  Examination reveals boggy nasal mucosa.  No tenderness over the sinuses.  Her pharynx is erythematous but there is no exudate.  Neck without nano   • History of Acute upper respiratory infection 3/14/2016    07/07/2017--patient seen in follow-up by Dr. Mix.  She reports she feels much better and her cough is resolved.  However, she still continues to have some phlegm in her throat that is not discolored.  Examination reveals clear lungs.  I have recommended she try Mucinex over-the-counter and drink plenty of fluids.  06/19/2017--patient was evaluated at the urgent care with a 5 day history of malaise, cough, headache, and fatigue.  She took some Cheratussin which provided some relief.  She was diagnosed as having a viral upper respiratory tract infection and prescription for Cheratussin given.  10/16/2015--patient presents with approximately 5 day history of sore throat, posterior nasal drainage, cough productive of scant clear phlegm.  No fever, chills, shortness of breath or other systemic signs or symptoms.  She has fatigue but no myalgias.  Examination reveals boggy nasal mucosa.  No tenderness over the sinuses.  Her pharynx is erythematous but there is no exudate.  Neck without adenopathy.  Lung exami   • History of anemia 03/11/2015 03/11/2015--CBC is normal with a hemoglobin of 12.2, hematocrit normal at 37.0.   04/25/2014--homocystine and methylmalonic acid are normal. Iron studies are normal, except iron saturation is low at 15%. Observation.   04/14/2014--CBC revealed a low hemoglobin of 11.5, hematocrit low at 34.7. Homocystine, methylmalonic acid, iron studies ordered. Patient is to followup on the phone for the results   • History of atrial flutter, 2003--successful catheter  ablation for persistent atrial flutter. 2003 2003--catheter ablation for persistent atrial flutter was successful.   • History of bone density study 01/31/2011 01/31/2011--DEXA scan revealed LS spine T score -2.2, left hip -1.0. Now osteopenia. 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   • History of carotid Doppler/vascular screen. 01/22/2008 01/22/2008--vascular screen was negative for carotid artery plaque, abdominal aorta normal, no PAD.   • History of carpal tunnel syndrome, bilateral 11/19/2009 11/19/2009--nerve conduction study of the upper extremities revealed bilateral carpal tunnel syndrome. January 2010--status post right carpal tunnel release. No surgical treatment on left.   • History of chest x-ray 9/28/2016 09/06/2016--chest x-ray performed by the urgent care for complaints of cough and shortness of breath.  This was compared to previous x-ray 02/15/2014 and is unchanged and reveals no acute disease.   • History of complete AV block, 10/26/2006--permanent pacemaker replacement.  12/18/2000--dual-chamber pacemaker placement for sick sinus syndrome, atrial flutter, AV block after CABG. 12/18/2000 12/18/2000--dual-chamber pacemaker placement for sick sinus syndrome, atrial flutter, AV block that occurred after her bypass surgery. 10/26/2006--status post pacemaker replacement. Medtronic EnRhythm [id number]   • History of drug rash, 01/11/2014--name brand Synthroid 01/11/2014 01/11/2014--patient presented with a diffuse erythematous rash that was very pruritic. Examination consistent with a drug reaction/allergic dermatitis. Believed to be secondary to name brand Synthroid. This was discontinued and patient treated with a Medrol Dosepak.   • History of echocardiogram 01/18/2012 01/18/2012 revealed ejection fraction of 58%, moderate mitral regurgitation.   • History of mammogram 09/10/2003    09/10/2003--normal mammogram.   • History of osteoporosis, June 2001 osteoporosis of  the LS spine.  2011--improved to osteopenia only. 2011,2001 01/31/2011--DEXA scan  revealed LS spine T score -2.2, left hip -1.0.  Now osteopenia.   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 2006--PM replacement.  Medtronic EnRhythm [id number]. 11/30/2015--PM generator change. 11/30/2015 11/30/2015--pacemaker generator change.  10/26/2006--status post pacemaker replacement. Medtronic EnRhythm [id number]  12/18/2000--dual-chamber pacemaker placement for sick sinus syndrome, atrial flutter, AV block that occurred after her bypass surgery.   2000--dual-chamber PM for SSS, A flutter, AVB after CABG. 2006--PM replacement.  Medtronic EnRhythm [id number]. 11/30/2015--PM generator change.   • History of pneumococcal vaccination 03/16/2015 03/16/2015--Prevnar 13 given. Patient's initial pneumococcal vaccination was given after the age of 65 and therefore no further pneumococcal immunization is required. 12/14/2007--Pneumovax given. 12/10/2002--pneumococcal vaccination given.   • History of Zostavax administration 1/17/2008 01/17/2008--Zostavax given at the local drug store.   • Hyperlipidemia 3/14/2016   • Impaired fasting glucose 3/16/2016    03/16/2015--type 2 diabetes is now evolved into impaired fasting glucose.  Hemoglobin A1c 5.7.  Diagnosed January 2010   • Moderate mitral regurgitation, 02/18/2012--moderate MR, EF 58%. 1/18/2012 01/18/2012 revealed ejection fraction of 58%, moderate mitral regurgitation.   • Occlusive coronary artery disease, 12/11/2000--status post 5 vessel CABG 12/11/2000 12/11/2000--status post 5 vessel CABG.  Patient subsequently had chest pain from her sternal wires which were removed.   • Onychomycosis of fingernails 3/14/2016    Patient has had onychomycosis involving her fingernails for several years.  She tries to control it with Ertaczo cream.   • Osteopenia, 01/31/2011--lumbar  spine -2.2, left hip -1.0. 6/1/2001 01/31/2011--DEXA scan  revealed LS spine T score -2.2, left hip -1.0.  Now osteopenia.   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   • Primary osteoarthritis of both knees 9/28/2016 09/28/2016--patient seen in follow-up and continues to have bilateral knee discomfort particularly with prolonged use.  Right knee is worse than the left.  She understandably does not want have a knee replacement but I think she would benefit from orthopedic evaluation for consideration of cortisone or visco supplementation.  Patient referred to Dr. Wallace.  08/26/2016--x-ray of the right knee reveals very severe degenerative changes involving the medial compartment with marked joint space narrowing and subchondral sclerosis and marginal spurring.  No joint effusion and no acute pathology.   • Questionable History of polymyalgia rheumatica --    This diagnosis somewhat suspect   • Statin intolerance 3/28/2017    Intolerance to multiple statins.   • Status post catheter ablation of atrial flutter, 2002--successful catheter ablation for persistent atrial flutter. 2003 2003--catheter ablation for persistent atrial flutter was successful.   • Subclinical hypothyroidism 5/9/2013    Diagnosed 05/16/2013.   05/09/2013--ultrasound thyroid for new hypothyroidism revealed a small thyroid gland consistent with hypothyroidism.  Mixed nodule present.   • Vitamin D deficiency 3/14/2016       Past Surgical History:   Procedure Laterality Date   • CARDIAC ABLATION  07/02/2003 07/02/2003--catheter ablation for persistent atrial flutter was successful.   • CARDIAC PACEMAKER PLACEMENT  12/18/2000 12/18/2000--dual-chamber pacemaker placement for sick sinus syndrome, atrial flutter, AV block that occurred after her bypass surgery.   • CARPAL TUNNEL RELEASE Right 01/2010 January 2010--status post right carpal tunnel release. No surgical treatment on left.   • CORONARY ARTERY BYPASS GRAFT   12/2000 December 2000--coronary artery bypass x 5   • HX OVARIAN CYSTECTOMY  Remote    Remote ovarian cyst excision.   • INCISIONAL BREAST BIOPSY  Remote    Benign   • PACEMAKER REPLACEMENT  10/26/2006    10/26/2006--status post pacemaker replacement. Medtronic EnRhythm [id number]    • PACEMAKER REPLACEMENT  11/30/2015 11/30/2015--pacemaker generator change.       Social History     Social History   • Marital status:      Spouse name: N/A   • Number of children: N/A   • Years of education: N/A     Occupational History   • Retired      Social History Main Topics   • Smoking status: Never Smoker   • Smokeless tobacco: Never Used   • Alcohol use No   • Drug use: No   • Sexual activity: Not Currently     Partners: Male     Other Topics Concern   • Not on file     Social History Narrative       Family History   Problem Relation Age of Onset   • Hyperlipidemia Mother    • Breast cancer Sister    • Diabetes type II Sister        Review of Systems   Constitution: Negative for chills, fever, malaise/fatigue, weight gain and weight loss.   HENT: Negative for ear pain, hearing loss, nosebleeds and sore throat.    Eyes: Negative for double vision, pain and visual disturbance.   Cardiovascular: Negative for chest pain, dyspnea on exertion, irregular heartbeat, leg swelling, near-syncope, orthopnea, palpitations, paroxysmal nocturnal dyspnea and syncope.   Respiratory: Negative for cough, shortness of breath, sleep disturbances due to breathing, snoring and wheezing.         Head is congested   Endocrine: Negative for cold intolerance, heat intolerance and polyuria.   Hematologic/Lymphatic: Bruises/bleeds easily.   Skin: Negative for itching and rash.   Musculoskeletal: Negative for joint pain, joint swelling and myalgias.   Gastrointestinal: Negative for abdominal pain, diarrhea, melena, nausea and vomiting.   Genitourinary: Negative for frequency, hematuria and hesitancy.   Neurological: Negative for excessive daytime  "sleepiness, headaches, light-headedness, numbness, paresthesias and seizures.   Psychiatric/Behavioral: Negative for altered mental status and depression.   Allergic/Immunologic: Negative.    All other systems reviewed and are negative.      Allergies   Allergen Reactions   • Atorvastatin    • Levothyroxine Sodium    • Other      Seafood   • Penicillins          Current Outpatient Prescriptions:   •  aspirin 81 MG tablet, Take 1 tablet by mouth daily., Disp: , Rfl:   •  Cholecalciferol (VITAMIN D) 1000 UNITS tablet, Take 1 tablet by mouth daily., Disp: , Rfl:   •  Coenzyme Q10 (COQ10) 400 MG capsule, Take 1 capsule by mouth daily. With food, Disp: , Rfl:   •  doxazosin (CARDURA) 4 MG tablet, TAKE ONE TABLET BY MOUTH EVERY MORNING, Disp: 90 tablet, Rfl: 0  •  metoprolol succinate XL (TOPROL-XL) 50 MG 24 hr tablet, TAKE ONE TABLET BY MOUTH DAILY, Disp: 90 tablet, Rfl: 1  •  olmesartan-hydrochlorothiazide (BENICAR HCT) 40-25 MG per tablet, TAKE ONE TABLET BY MOUTH DAILY FOR BLOOD PRESSURE, Disp: 90 tablet, Rfl: 1  •  Red Yeast Rice 600 MG tablet, Take 2 tablets by mouth every day., Disp: , Rfl:      Objective:     Vitals:    12/11/17 1136   BP: 140/60   Pulse: 73   Weight: 52.6 kg (116 lb)   Height: 149.9 cm (59.02\")     Body mass index is 23.42 kg/(m^2).    PHYSICAL EXAM:    Vitals Reviewed.   General Appearance: No acute distress, well developed and well nourished.   Eyes: Conjunctiva and lids: No erythema, swelling, or discharge. Sclera non-icteric.   HENT: Atraumatic, normocephalic. External eyes, ears, and nose normal.   Respiratory: No signs of respiratory distress. Respiration rhythm and depth normal.   Clear to auscultation. No rales, crackles, rhonchi, or wheezing auscultated.   Cardiovascular:  Jugular Venous Pressure: Normal  Heart Rate and Rhythm: Normal, Heart Sounds: Normal S1 and S2. No S3 or S4 noted.  Murmurs: No murmurs noted. No rubs, thrills, or gallops.   Arterial Pulses:  Posterior tibialis and " dorsalis pedis pulses normal.   Lower Extremities: No edema noted.  Gastrointestinal:  Abdomen soft, non-distended, non-tender.   Musculoskeletal: Normal movement of extremities  Skin and Nails: General appearance normal. No pallor, cyanosis, diaphoresis. Skin temperature normal. No clubbing of fingernails.   Psychiatric: Patient alert and oriented to person, place, and time. Speech and behavior appropriate. Normal mood and affect.       ECG 12 Lead  Date/Time: 2017 12:01 PM  Performed by: PARISH TRUJILLO  Authorized by: PARISH TRUJILLO   Comparison: compared with previous ECG from 8/15/2016  Similar to previous ECG  Rhythm: sinus rhythm and paced  BPM: 73  Pacin% capture                Assessment:       Diagnosis Plan   1. History of complete AV block, --dual-chamber PM for SSS, A flutter, AVB after CABG. --PM replacement.  Medtronic EnRhythm [id number]. 2015--PM generator change.     2. History of atrial flutter, --successful catheter ablation for persistent atrial flutter.     3. Occlusive coronary artery disease, 2000--status post 5 vessel CABG     4. Benign essential hypertension            Plan:       1. AV block, s/p PPM. Normal device testing and function. Remote check in 3 months.    2. Hx of aflutter, s/p ablation in . No recurrence.    3. Coronary Artery Disease  Assessment  • The patient has no angina  • No angina. Continue current medication regimen.     Subjective - Objective  • There is a history of previous coronary artery bypass graft on or around 2000  • Current antiplatelet therapy includes aspirin 81 mg      4. HTN, controlled.    Return to see Dr. Fontanez in 1 year or sooner should the need arise.     As always, it has been a pleasure to participate in your patient's care.      Sincerely,         WATSON Pedraza

## 2018-02-05 RX ORDER — DOXAZOSIN MESYLATE 4 MG/1
TABLET ORAL
Qty: 90 TABLET | Refills: 0 | Status: SHIPPED | OUTPATIENT
Start: 2018-02-05 | End: 2018-02-28

## 2018-02-14 ENCOUNTER — APPOINTMENT (OUTPATIENT)
Dept: CT IMAGING | Facility: HOSPITAL | Age: 83
End: 2018-02-14

## 2018-02-14 ENCOUNTER — HOSPITAL ENCOUNTER (EMERGENCY)
Facility: HOSPITAL | Age: 83
Discharge: HOME | End: 2018-02-14
Attending: EMERGENCY MEDICINE | Admitting: EMERGENCY MEDICINE

## 2018-02-14 ENCOUNTER — APPOINTMENT (OUTPATIENT)
Dept: GENERAL RADIOLOGY | Facility: HOSPITAL | Age: 83
End: 2018-02-14

## 2018-02-14 VITALS
SYSTOLIC BLOOD PRESSURE: 108 MMHG | OXYGEN SATURATION: 95 % | TEMPERATURE: 97.9 F | HEART RATE: 71 BPM | HEIGHT: 60 IN | WEIGHT: 114 LBS | BODY MASS INDEX: 22.38 KG/M2 | DIASTOLIC BLOOD PRESSURE: 44 MMHG | RESPIRATION RATE: 18 BRPM

## 2018-02-14 DIAGNOSIS — R53.83 FATIGUE, UNSPECIFIED TYPE: ICD-10-CM

## 2018-02-14 DIAGNOSIS — R42 DIZZINESS: Primary | ICD-10-CM

## 2018-02-14 DIAGNOSIS — E87.1 HYPONATREMIA: ICD-10-CM

## 2018-02-14 LAB
ABO GROUP BLD: NORMAL
ALBUMIN SERPL-MCNC: 3.9 G/DL (ref 3.5–5.2)
ALBUMIN/GLOB SERPL: 1.5 G/DL
ALP SERPL-CCNC: 77 U/L (ref 39–117)
ALT SERPL W P-5'-P-CCNC: 26 U/L (ref 1–33)
ANION GAP SERPL CALCULATED.3IONS-SCNC: 12.7 MMOL/L
AST SERPL-CCNC: 23 U/L (ref 1–32)
BASOPHILS # BLD AUTO: 0.01 10*3/MM3 (ref 0–0.2)
BASOPHILS NFR BLD AUTO: 0.1 % (ref 0–1.5)
BILIRUB SERPL-MCNC: 0.8 MG/DL (ref 0.1–1.2)
BLD GP AB SCN SERPL QL: NEGATIVE
BUN BLD-MCNC: 22 MG/DL (ref 8–23)
BUN/CREAT SERPL: 24.2 (ref 7–25)
CALCIUM SPEC-SCNC: 9.2 MG/DL (ref 8.2–9.6)
CHLORIDE SERPL-SCNC: 89 MMOL/L (ref 98–107)
CO2 SERPL-SCNC: 27.3 MMOL/L (ref 22–29)
CREAT BLD-MCNC: 0.91 MG/DL (ref 0.57–1)
DEPRECATED RDW RBC AUTO: 47 FL (ref 37–54)
EOSINOPHIL # BLD AUTO: 0.01 10*3/MM3 (ref 0–0.7)
EOSINOPHIL NFR BLD AUTO: 0.1 % (ref 0.3–6.2)
ERYTHROCYTE [DISTWIDTH] IN BLOOD BY AUTOMATED COUNT: 14.5 % (ref 11.7–13)
GFR SERPL CREATININE-BSD FRML MDRD: 58 ML/MIN/1.73
GLOBULIN UR ELPH-MCNC: 2.6 GM/DL
GLUCOSE BLD-MCNC: 139 MG/DL (ref 65–99)
HCT VFR BLD AUTO: 35.4 % (ref 35.6–45.5)
HGB BLD-MCNC: 11.8 G/DL (ref 11.9–15.5)
HOLD SPECIMEN: NORMAL
HOLD SPECIMEN: NORMAL
IMM GRANULOCYTES # BLD: 0.04 10*3/MM3 (ref 0–0.03)
IMM GRANULOCYTES NFR BLD: 0.3 % (ref 0–0.5)
INR PPP: 1.15 (ref 0.9–1.1)
LYMPHOCYTES # BLD AUTO: 1.01 10*3/MM3 (ref 0.9–4.8)
LYMPHOCYTES NFR BLD AUTO: 8.4 % (ref 19.6–45.3)
MCH RBC QN AUTO: 29.3 PG (ref 26.9–32)
MCHC RBC AUTO-ENTMCNC: 33.3 G/DL (ref 32.4–36.3)
MCV RBC AUTO: 87.8 FL (ref 80.5–98.2)
MONOCYTES # BLD AUTO: 0.98 10*3/MM3 (ref 0.2–1.2)
MONOCYTES NFR BLD AUTO: 8.2 % (ref 5–12)
NEUTROPHILS # BLD AUTO: 9.92 10*3/MM3 (ref 1.9–8.1)
NEUTROPHILS NFR BLD AUTO: 82.9 % (ref 42.7–76)
PLATELET # BLD AUTO: 147 10*3/MM3 (ref 140–500)
PMV BLD AUTO: 10 FL (ref 6–12)
POTASSIUM BLD-SCNC: 4 MMOL/L (ref 3.5–5.2)
PROT SERPL-MCNC: 6.5 G/DL (ref 6–8.5)
PROTHROMBIN TIME: 14.5 SECONDS (ref 11.7–14.2)
RBC # BLD AUTO: 4.03 10*6/MM3 (ref 3.9–5.2)
RH BLD: POSITIVE
SODIUM BLD-SCNC: 129 MMOL/L (ref 136–145)
WBC NRBC COR # BLD: 11.97 10*3/MM3 (ref 4.5–10.7)
WHOLE BLOOD HOLD SPECIMEN: NORMAL
WHOLE BLOOD HOLD SPECIMEN: NORMAL

## 2018-02-14 PROCEDURE — 86901 BLOOD TYPING SEROLOGIC RH(D): CPT | Performed by: EMERGENCY MEDICINE

## 2018-02-14 PROCEDURE — 93005 ELECTROCARDIOGRAM TRACING: CPT

## 2018-02-14 PROCEDURE — 99284 EMERGENCY DEPT VISIT MOD MDM: CPT

## 2018-02-14 PROCEDURE — 96361 HYDRATE IV INFUSION ADD-ON: CPT

## 2018-02-14 PROCEDURE — 85610 PROTHROMBIN TIME: CPT | Performed by: EMERGENCY MEDICINE

## 2018-02-14 PROCEDURE — 80053 COMPREHEN METABOLIC PANEL: CPT | Performed by: EMERGENCY MEDICINE

## 2018-02-14 PROCEDURE — 96360 HYDRATION IV INFUSION INIT: CPT

## 2018-02-14 PROCEDURE — 93005 ELECTROCARDIOGRAM TRACING: CPT | Performed by: EMERGENCY MEDICINE

## 2018-02-14 PROCEDURE — 36415 COLL VENOUS BLD VENIPUNCTURE: CPT | Performed by: EMERGENCY MEDICINE

## 2018-02-14 PROCEDURE — 70450 CT HEAD/BRAIN W/O DYE: CPT

## 2018-02-14 PROCEDURE — 71046 X-RAY EXAM CHEST 2 VIEWS: CPT

## 2018-02-14 PROCEDURE — 86850 RBC ANTIBODY SCREEN: CPT | Performed by: EMERGENCY MEDICINE

## 2018-02-14 PROCEDURE — 85025 COMPLETE CBC W/AUTO DIFF WBC: CPT | Performed by: EMERGENCY MEDICINE

## 2018-02-14 PROCEDURE — 93010 ELECTROCARDIOGRAM REPORT: CPT | Performed by: INTERNAL MEDICINE

## 2018-02-14 PROCEDURE — 86900 BLOOD TYPING SEROLOGIC ABO: CPT | Performed by: EMERGENCY MEDICINE

## 2018-02-14 RX ORDER — SODIUM CHLORIDE 9 MG/ML
125 INJECTION, SOLUTION INTRAVENOUS CONTINUOUS
Status: DISCONTINUED | OUTPATIENT
Start: 2018-02-14 | End: 2018-02-15 | Stop reason: HOSPADM

## 2018-02-14 RX ORDER — SODIUM CHLORIDE 0.9 % (FLUSH) 0.9 %
10 SYRINGE (ML) INJECTION AS NEEDED
Status: DISCONTINUED | OUTPATIENT
Start: 2018-02-14 | End: 2018-02-15 | Stop reason: HOSPADM

## 2018-02-14 RX ADMIN — SODIUM CHLORIDE 500 ML: 9 INJECTION, SOLUTION INTRAVENOUS at 21:35

## 2018-02-14 RX ADMIN — SODIUM CHLORIDE 125 ML/HR: 9 INJECTION, SOLUTION INTRAVENOUS at 22:22

## 2018-02-14 NOTE — ED NOTES
"Patient states \"I went to the [facility] care center because I was feeling dizzy. They did an EKG on me and sent me here because they said I had an irregular heart rate.\"      Kayleen Baldwin RN  02/14/18 2018    "

## 2018-02-14 NOTE — ED TRIAGE NOTES
Patient reports that she has felt dizzy since Monday early in the night and has been almost continuously dizzy since, Patient has a right sided facial droop which was covered by a mask when she came up to the triage desk initially. Patient has no other deficits noted, patient speech appears normal,  equal, and patient is able to ambulate.

## 2018-02-15 NOTE — ED PROVIDER NOTES
EMERGENCY DEPARTMENT ENCOUNTER    CHIEF COMPLAINT  Chief Complaint: weakness  History given by: patient  History limited by: none  Room Number: 14/14  PMD: MD Dr. Huseyin Nj cardiologist    HPI:  Pt is a 89 y.o. female who presents complaining of intermittent light headedness and dizziness that began 3 days ago. Today, sx have worsened and she now has decreased appetite and fatigue. Pt reports sx are worsened with bending over giving her a vague sense of moving and improve when she is still. She has associated sinus drainage and congestion, but denies cough, fever, SOA, CP, palpitations, urinary or GI sx, rash, abnormal pedal edema, and abd pain. Pt and family deny noticing any unilateral weakness, facial droop, or speech abnormalities. Pt's family advised they had taken her to the  prior to the ED and was sent to the ED for further evaluation. Pt takes 81 mg of ASA a day and has no hx of abnormal bleeding.    Duration:  3 days  Onset: gradual  Timing: intermittent  Location: n/a  Radiation: n/a  Quality: dizziness  Intensity/Severity: moderate  Progression: worsening  Associated Symptoms: decreased appetite, fatigue, sinus drainage and congestion  Aggravating Factors: bending over  Alleviating Factors: none  Previous Episodes: none  Treatment before arrival: Pt evaluated at the  [facility]    PAST MEDICAL HISTORY  Active Ambulatory Problems     Diagnosis Date Noted   • Occlusive coronary artery disease, 12/11/2000--status post 5 vessel CABG 12/11/2000   • Subclinical hypothyroidism 05/09/2013   • Impaired fasting glucose 03/16/2016   • Onychomycosis of fingernails 03/14/2016   • Osteopenia, 01/31/2011--lumbar spine -2.2, left hip -1.0. 06/01/2001   • Vitamin D deficiency 03/14/2016   • Hyperlipidemia 03/14/2016   • Benign essential hypertension 03/14/2016   • Generalized osteoarthritis of multiple sites 03/14/2016   • Therapeutic drug monitoring 03/14/2016   • History of atrial flutter,  2003--successful catheter ablation for persistent atrial flutter. 01/01/2003   • History of complete AV block, 2000--dual-chamber PM for SSS, A flutter, AVB after CABG. 2006--PM replacement.  Medtronic EnRhythm [id number]. 11/30/2015--PM generator change. 12/18/2000   • Moderate mitral regurgitation, 02/18/2012--moderate MR, EF 58%. 01/18/2012   • Status post catheter ablation of atrial flutter, 2002--successful catheter ablation for persistent atrial flutter. 01/01/2003   • History of permanent cardiac PM, 2000--dual-chamber PM for SSS, A flutter, AVB after CABG. 2006--PM replacement.  Medtronic EnRhythm [id number]. 11/30/2015--PM generator change. 11/30/2015   • Primary osteoarthritis of both knees 09/28/2016   • Statin intolerance 03/28/2017     Resolved Ambulatory Problems     Diagnosis Date Noted   • History of acute pharyngitis 03/14/2016   • History of Acute upper respiratory infection 03/14/2016   • History of osteoporosis, June 2001 osteoporosis of the LS spine.  2011--improved to osteopenia only. 03/14/2016   • History of acute bronchitis 03/14/2016   • History of anemia 03/14/2016   • History of drug rash, 01/11/2014--name brand Synthroid 03/14/2016   • History of bone density study 03/14/2016   • History of carotid Doppler/vascular screen. 03/14/2016   • History of echocardiogram 03/14/2016   • History of mammogram 03/14/2016   • History of carpal tunnel syndrome, bilateral 03/14/2016   • History of pneumococcal vaccination 03/14/2016   • Questionable History of polymyalgia rheumatica 03/14/2016   • History of chest x-ray 09/28/2016   • Chronic cough 09/28/2016   • History of Zostavax administration 01/17/2008     Past Medical History:   Diagnosis Date   • Benign essential hypertension 3/14/2016   • Generalized osteoarthritis of multiple sites 3/14/2016   • History of acute bronchitis 02/09/2015   • History of acute pharyngitis 10/16/2015   • History of Acute upper respiratory infection 3/14/2016   • History  of anemia 03/11/2015   • History of atrial flutter, 2003--successful catheter ablation for persistent atrial flutter. 2003   • History of bone density study 01/31/2011   • History of carotid Doppler/vascular screen. 01/22/2008   • History of carpal tunnel syndrome, bilateral 11/19/2009   • History of chest x-ray 9/28/2016   • History of complete AV block, 10/26/2006--permanent pacemaker replacement.  12/18/2000--dual-chamber pacemaker placement for sick sinus syndrome, atrial flutter, AV block after CABG. 12/18/2000   • History of drug rash, 01/11/2014--name brand Synthroid 01/11/2014   • History of echocardiogram 01/18/2012   • History of mammogram 09/10/2003   • History of osteoporosis, June 2001 osteoporosis of the LS spine.  2011--improved to osteopenia only. 2011,2001   • History of permanent cardiac PM, 2000--dual-chamber PM for SSS, A flutter, AVB after CABG. 2006--PM replacement.  Medtronic EnRhythm [id number]. 11/30/2015--PM generator change. 11/30/2015   • History of pneumococcal vaccination 03/16/2015   • History of Zostavax administration 1/17/2008   • Hyperlipidemia 3/14/2016   • Impaired fasting glucose 3/16/2016   • Moderate mitral regurgitation, 02/18/2012--moderate MR, EF 58%. 1/18/2012   • Occlusive coronary artery disease, 12/11/2000--status post 5 vessel CABG 12/11/2000   • Onychomycosis of fingernails 3/14/2016   • Osteopenia, 01/31/2011--lumbar spine -2.2, left hip -1.0. 6/1/2001   • Pacemaker    • Primary osteoarthritis of both knees 9/28/2016   • Questionable History of polymyalgia rheumatica --   • Statin intolerance 3/28/2017   • Status post catheter ablation of atrial flutter, 2002--successful catheter ablation for persistent atrial flutter. 2003   • Subclinical hypothyroidism 5/9/2013   • Vitamin D deficiency 3/14/2016       PAST SURGICAL HISTORY  Past Surgical History:   Procedure Laterality Date   • CARDIAC ABLATION  07/02/2003 07/02/2003--catheter ablation for persistent atrial flutter  was successful.   • CARDIAC PACEMAKER PLACEMENT  12/18/2000 12/18/2000--dual-chamber pacemaker placement for sick sinus syndrome, atrial flutter, AV block that occurred after her bypass surgery.   • CARPAL TUNNEL RELEASE Right 01/2010 January 2010--status post right carpal tunnel release. No surgical treatment on left.   • CORONARY ARTERY BYPASS GRAFT  12/2000 December 2000--coronary artery bypass x 5   • HX OVARIAN CYSTECTOMY  Remote    Remote ovarian cyst excision.   • INCISIONAL BREAST BIOPSY  Remote    Benign   • PACEMAKER REPLACEMENT  10/26/2006    10/26/2006--status post pacemaker replacement. Medtronic EnRhythm [id number]    • PACEMAKER REPLACEMENT  11/30/2015 11/30/2015--pacemaker generator change.       FAMILY HISTORY  Family History   Problem Relation Age of Onset   • Hyperlipidemia Mother    • Breast cancer Sister    • Diabetes type II Sister        SOCIAL HISTORY  Social History     Social History   • Marital status:      Spouse name: N/A   • Number of children: N/A   • Years of education: N/A     Occupational History   • Retired      Social History Main Topics   • Smoking status: Never Smoker   • Smokeless tobacco: Never Used   • Alcohol use No   • Drug use: No   • Sexual activity: Not Currently     Partners: Male     Other Topics Concern   • Not on file     Social History Narrative   • No narrative on file       ALLERGIES  Atorvastatin; Levothyroxine sodium; Other; and Penicillins    REVIEW OF SYSTEMS  Review of Systems   Constitutional: Positive for appetite change (decreased) and fatigue. Negative for fever.   HENT: Positive for congestion and rhinorrhea. Negative for sore throat.    Eyes: Negative.    Respiratory: Negative for cough and shortness of breath.    Cardiovascular: Negative for chest pain.   Gastrointestinal: Negative for abdominal pain, diarrhea and vomiting.   Genitourinary: Negative for dysuria.   Musculoskeletal: Negative for neck pain.   Skin: Negative for rash.    Allergic/Immunologic: Negative.    Neurological: Positive for dizziness, weakness and light-headedness. Negative for facial asymmetry, speech difficulty, numbness and headaches.   Hematological: Negative.    Psychiatric/Behavioral: Negative.    All other systems reviewed and are negative.      PHYSICAL EXAM  ED Triage Vitals   Temp Heart Rate Resp BP SpO2   02/14/18 1758 02/14/18 1758 02/14/18 1758 02/14/18 1845 02/14/18 1758   99.3 °F (37.4 °C) 81 18 125/66 94 %      Temp src Heart Rate Source Patient Position BP Location FiO2 (%)   -- 02/14/18 2053 02/14/18 2053 02/14/18 2053 --    Monitor Lying Right arm        Physical Exam   Constitutional: She is oriented to person, place, and time and well-developed, well-nourished, and in no distress. No distress.   HENT:   Head: Normocephalic and atraumatic.   Right Ear: External ear normal.   Left Ear: External ear normal.   Eyes: EOM are normal. Pupils are equal, round, and reactive to light.   Neck: Normal range of motion. Neck supple.   Cardiovascular: Normal rate, regular rhythm, normal heart sounds and intact distal pulses.    No murmur heard.  Pulmonary/Chest: Effort normal and breath sounds normal. No respiratory distress.   Abdominal: Soft. There is no tenderness. There is no rebound and no guarding.   Musculoskeletal: Normal range of motion. She exhibits no edema.   Neurological: She is alert and oriented to person, place, and time. She has normal sensation and normal strength. She displays facial symmetry. She has a normal Straight Leg Raise Test and a normal Finger-Nose-Finger Test.   Skin: Skin is warm and dry. No rash noted.   Psychiatric: Mood and affect normal.   Nursing note and vitals reviewed.      LAB RESULTS  Lab Results (last 24 hours)     Procedure Component Value Units Date/Time    CBC & Differential [[id number]] Collected:  02/14/18 1858    Specimen:  Blood Updated:  02/14/18 1914    Narrative:       The following orders were created for panel  order CBC & Differential.  Procedure                               Abnormality         Status                     ---------                               -----------         ------                     CBC Auto Differential[[id number]]        Abnormal            Final result                 Please view results for these tests on the individual orders.    Comprehensive Metabolic Panel [[id number]]  (Abnormal) Collected:  02/14/18 1858    Specimen:  Blood Updated:  02/14/18 1932     Glucose 139 (H) mg/dL      BUN 22 mg/dL      Creatinine 0.91 mg/dL      Sodium 129 (L) mmol/L      Potassium 4.0 mmol/L      Chloride 89 (L) mmol/L      CO2 27.3 mmol/L      Calcium 9.2 mg/dL      Total Protein 6.5 g/dL      Albumin 3.90 g/dL      ALT (SGPT) 26 U/L      AST (SGOT) 23 U/L      Alkaline Phosphatase 77 U/L      Total Bilirubin 0.8 mg/dL      eGFR Non African Amer 58 (L) mL/min/1.73      Globulin 2.6 gm/dL      A/G Ratio 1.5 g/dL      BUN/Creatinine Ratio 24.2     Anion Gap 12.7 mmol/L     Narrative:       The MDRD GFR formula is only valid for adults with stable renal function between ages 18 and 70.    Protime-INR [[id number]]  (Abnormal) Collected:  02/14/18 1858    Specimen:  Blood Updated:  02/14/18 1929     Protime 14.5 (H) Seconds      INR 1.15 (H)    CBC Auto Differential [[id number]]  (Abnormal) Collected:  02/14/18 1858    Specimen:  Blood Updated:  02/14/18 1914     WBC 11.97 (H) 10*3/mm3      RBC 4.03 10*6/mm3      Hemoglobin 11.8 (L) g/dL      Hematocrit 35.4 (L) %      MCV 87.8 fL      MCH 29.3 pg      MCHC 33.3 g/dL      RDW 14.5 (H) %      RDW-SD 47.0 fl      MPV 10.0 fL      Platelets 147 10*3/mm3      Neutrophil % 82.9 (H) %      Lymphocyte % 8.4 (L) %      Monocyte % 8.2 %      Eosinophil % 0.1 (L) %      Basophil % 0.1 %      Immature Grans % 0.3 %      Neutrophils, Absolute 9.92 (H) 10*3/mm3      Lymphocytes, Absolute 1.01 10*3/mm3      Monocytes, Absolute 0.98 10*3/mm3      Eosinophils, Absolute 0.01 10*3/mm3       Basophils, Absolute 0.01 10*3/mm3      Immature Grans, Absolute 0.04 (H) 10*3/mm3           I ordered the above labs and reviewed the results    RADIOLOGY  CT Head Without Contrast Stroke Protocol   Preliminary Result       There is no evidence for acute intracranial pathology.       However, if there is suspicion for posterior fossa ischemia, further   evaluation could be performed with MR imaging for much sensitive and   specific evaluation.       These findings and recommendations were discussed with Yakelin Monson of the   emergency room on 02/14/2018 at approximately 7:34 PM.       Radiation dose reduction techniques were utilized, including automated   exposure control and exposure modulation based on body size.              XR Chest 2 View   Final Result   Small likely atelectasis at the left base. Tortuous aorta.   Follow-up as clinically indicated.       This report was finalized on 2/14/2018 7:12 PM by Dr. Mitchell Portillo MD.               I ordered the above noted radiological studies. Interpreted by radiologist. Reviewed by me in PACS.       PROCEDURES  Procedures  EKG at the [facility]         EKG time: 1546  Rhythm/Rate:A-V paced, wide complex w/ occasional PVCs, 60s    Interpreted Contemporaneously by me, independently viewed  unchanged compared to prior 12/11/17    EKG           EKG time: 1804  Rhythm/Rate: ventriclar paced 80s, wide complex  QRS, axis: frequent PVCs    Interpreted Contemporaneously by me, independently viewed  Minimally changed compared to prior.    PROGRESS AND CONSULTS  ED Course   2134  IVF ordered for hydration.    2213  Call placed to PCP.    2217  Consulted with Dr. Mix, PCP, about the pt. He is alright with the plan to d/c pt home and to see her in the office next week.    2225  BP- 126/51 HR- 83 Temp- 97.9 °F (36.6 °C) (Oral) O2 sat- 97%  Rechecked the patient who is in NAD and is resting comfortably, ambulates well, refusing the walker, tolerating PO well. Discussed labs  showing low sodium but otherwise unremarkable and imaging showing no acute processes. Pt made aware of the consult with Dr. Mix and the plan to d/c pt home and follow up with him next. Pt instructed to drink plenty of fluids and to graze foods throughout the day as tolerated. Pt understands and agrees with the plan, all questions answered.    MEDICAL DECISION MAKING  Results were reviewed/discussed with the patient and they were also made aware of online access. Pt also made aware that some labs, such as cultures, will not be resulted during ER visit and follow up with PMD is necessary.     MDM  Number of Diagnoses or Management Options     Amount and/or Complexity of Data Reviewed  Clinical lab tests: reviewed (Sodium 129)  Tests in the radiology section of CPT®: reviewed (CT head-negative  CXR-likely atelectasis in the left base)  Tests in the medicine section of CPT®: reviewed (See EKG procedure note.)           DIAGNOSIS  Final diagnoses:   Dizziness   Fatigue, unspecified type   Hyponatremia       DISPOSITION  DISCHARGE    Patient discharged in stable condition.    Reviewed implications of results, diagnosis, meds, responsibility to follow up, warning signs and symptoms of possible worsening, potential complications and reasons to return to ER.    Patient/Family voiced understanding of above instructions.    Discussed plan for discharge, as there is no emergent indication for admission.  Pt/family is agreeable and understands need for follow up and repeat testing.  Pt is aware that discharge does not mean that nothing is wrong but it indicates no emergency is present that requires admission and they must continue care with follow-up as given below or physician of their choice.     FOLLOW-UP  Adilson Mix MD  [id number] [id number] [phone]    Schedule an appointment as soon as possible for a visit in 3 days  EVEN IF WELL         Medication List      Notice     No changes  were made to your prescriptions during this visit.        Latest Documented Vital Signs:  As of 10:27 PM  BP- 126/51 HR- 83 Temp- 97.9 °F (36.6 °C) (Oral) O2 sat- 97%    --  Documentation assistance provided by [employer] for Dr. Lawson. Information recorded by the scribe was done at my direction and has been verified and validated by me.     Trish Carbone  02/14/18 [id number]       Rea Lawson MD  02/15/18 1960

## 2018-02-15 NOTE — ED NOTES
Pt c/o dizziness while lying down that started Sunday. Pt also c/o a right side headache that started this morning. Pt denies falling. Pt is a&o x 4. Pt denies N/V. No facial droop noted. Family at bedside.     Maggie Young RN  02/14/18 [id number]

## 2018-02-15 NOTE — ED NOTES
Pt denies dizziness at this time. Slight headache on right side only     Laila Heard, GARCIA  02/14/18 2056

## 2018-02-15 NOTE — DISCHARGE INSTRUCTIONS
You are advised to follow closely with Dr Mix in 3-5 days for recheck, final results of lab work and imaging testing, and further testing/treatment as needed.      Eat small meals with frequent snacks at least 6 times daily and use walker as needed to assist with stability as needed.    Please return to the emergency department immediately with chest pain different than usual for you, shortness of air, abdominal pain, persistent vomiting/fever, blood in emesis or stool, lightheadedness/fainting, problems with speech, one sided weakness/numbness, new incontinence, problems with vision, altered mental status or for worsening of symptoms or other concerns.

## 2018-02-16 ENCOUNTER — TELEPHONE (OUTPATIENT)
Dept: SOCIAL WORK | Facility: HOSPITAL | Age: 83
End: 2018-02-16

## 2018-02-16 NOTE — TELEPHONE ENCOUNTER
Follow up call to patient; spoke w/patient who reports feeling better. Has scheduled a f/u appt w/PCP for next week. No further concerns at this time. Lora Knight RN

## 2018-02-21 ENCOUNTER — PATIENT OUTREACH (OUTPATIENT)
Dept: CASE MANAGEMENT | Facility: OTHER | Age: 83
End: 2018-02-21

## 2018-02-21 NOTE — OUTREACH NOTE
Pt. Reports she has not had any further episodes of dizziness. Pt was instructed in the ER to drink plenty of fluids and to graze foods throughout the day as tolerated.She is trying this but does not like eating all day long. She is aware her sodium was low and is allowing herself more salt. F/U has been planned with PCP.Explained role of Care Advisor and contact information given to patient.No other questions or concerns voiced at this time.

## 2018-02-28 ENCOUNTER — OFFICE VISIT (OUTPATIENT)
Dept: INTERNAL MEDICINE | Facility: CLINIC | Age: 83
End: 2018-02-28

## 2018-02-28 VITALS
HEIGHT: 60 IN | WEIGHT: 119 LBS | SYSTOLIC BLOOD PRESSURE: 116 MMHG | DIASTOLIC BLOOD PRESSURE: 56 MMHG | BODY MASS INDEX: 23.36 KG/M2 | OXYGEN SATURATION: 98 % | HEART RATE: 64 BPM

## 2018-02-28 DIAGNOSIS — E87.1 HYPONATREMIA: ICD-10-CM

## 2018-02-28 DIAGNOSIS — Z09 HOSPITAL DISCHARGE FOLLOW-UP: Primary | ICD-10-CM

## 2018-02-28 DIAGNOSIS — R42 EPISODIC LIGHTHEADEDNESS: ICD-10-CM

## 2018-02-28 DIAGNOSIS — I10 BENIGN ESSENTIAL HYPERTENSION: Chronic | ICD-10-CM

## 2018-02-28 PROCEDURE — 99214 OFFICE O/P EST MOD 30 MIN: CPT | Performed by: INTERNAL MEDICINE

## 2018-02-28 NOTE — PROGRESS NOTES
02/28/2018    Patient Information  Lora Serrano                                                                                          [id number] [facility] [address]      7/15/1926  [phone]      Chief Complaint:     Follow-up emergency room visit for lightheadedness.  No new acute complaints.    History of Present Illness:    Patient with a history of coronary artery disease, subclinical hypothyroidism, impaired fasting glucose, osteopenia, hyperlipidemia, hypertension, history of atrial flutter, history of complete AV block, status post permanent cardiac pacemaker.  She presents today to follow-up on recent emergency room visit for complaints of lightheadedness.  This will be described below.  Past medical history reviewed and updated where necessary including health maintenance parameters.  This reveals she is up-to-date.    The history regarding lightheadedness and recent emergency room visit as well as low-sodium:    02/28/2018--patient seen in follow-up and reports she is feeling better.  The lightheadedness/dizziness has resolved.  She feels fairly well but notes that she moves somewhat slowly compared to previous and she attributes this to age.  Upon review of the emergency room records I noticed that she did present with a low-grade fever of 99.3.  On examination today she looks good and her lungs are clear.  Heart is regular.  There is no significant edema.  No neurologic deficit.  We will check a CMP today to check on the low sodium and I will contact patient with the results tomorrow and possible further instructions.  Also her blood pressure has been running somewhat low on several occasions.  She is currently taking Benicar HCT 40/25, one half by mouth daily along with metoprolol succinate extended release 50 mg per day and Cardura 4 mg per day.  Given her low blood pressure and history of lightheadedness, we will discontinue the Cardura and reassess her blood pressure  in about 3-4 weeks.  Check CMP today.    02/14/2018--patient presented to the emergency room after being evaluated at the urgent care and told to go to the ER.  She presented with a three-day history of intermittent lightheadedness and dizziness and the symptoms had worsened over the past day.  He was subsequently removed decreased appetite and fatigue.  Her symptoms were worse with bending over and this gave her a vague sense of moving.  The symptoms seemed to improve when she was still.  She had associated sinus drainage and congestion but denied cough, fever, shortness of breath, chest pain, outpatient, urinary or gastrointestinal symptoms, lower extremity swelling or abdominal pain.  There was no sign of unilateral weakness or facial droop or speech abnormalities.  Workup in the emergency room included a CT scan of the brain which revealed nothing acute.  Her CMP showed a low sodium of 129 and her chloride was low at 89.  BUN and creatinine were normal.  CBC revealed a mild elevation of white count at 11.97 with a little left shift.  Chest x-ray revealed atelectasis at the left base but nothing acute.  Electrocardiogram revealed a paced rhythm with wide complex QRS.  Patient was discharged from the emergency room with instructions to follow-up with primary care doctor within the next week or so.  He was also told to eat 6 meals per day according to the patient.    Review of Systems   Constitution: Negative.   HENT: Negative.    Eyes: Negative.    Cardiovascular: Negative.    Respiratory: Negative.    Endocrine: Negative.    Hematologic/Lymphatic: Negative.    Skin: Negative.    Musculoskeletal: Negative.    Gastrointestinal: Negative.    Genitourinary: Negative.    Neurological: Negative.    Psychiatric/Behavioral: Negative.    Allergic/Immunologic: Negative.        Active Problems:    Patient Active Problem List   Diagnosis   • Occlusive coronary artery disease, 12/11/2000--status post 5 vessel CABG   •  Subclinical hypothyroidism   • Impaired fasting glucose   • Onychomycosis of fingernails   • Osteopenia, 01/31/2011--lumbar spine -2.2, left hip -1.0.   • Vitamin D deficiency   • Hyperlipidemia   • Benign essential hypertension   • Generalized osteoarthritis of multiple sites   • Therapeutic drug monitoring   • History of atrial flutter, 2003--successful catheter ablation for persistent atrial flutter.   • History of complete AV block, 2000--dual-chamber PM for SSS, A flutter, AVB after CABG. 2006--PM replacement.  Medtronic EnRhythm [id number]. 11/30/2015--PM generator change.   • Moderate mitral regurgitation, 02/18/2012--moderate MR, EF 58%.   • Status post catheter ablation of atrial flutter, 2002--successful catheter ablation for persistent atrial flutter.   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 2006--PM replacement.  Medtronic EnRhythm [id number]. 11/30/2015--PM generator change.   • Primary osteoarthritis of both knees   • Statin intolerance   • Hospital discharge follow-up   • Episodic lightheadedness   • Hyponatremia         Past Medical History:   Diagnosis Date   • Benign essential hypertension 3/14/2016   • Generalized osteoarthritis of multiple sites 3/14/2016   • History of acute bronchitis 02/09/2015 02/09/2015--patient presents with a 5 or 6 day history of chest congestion, cough productive of yellow/green phlegm that has cleared some over the past day or so. No significant worsening shortness of breath. She has had some chills but no documented fever. Not much in the way of ENT symptoms. Examination reveals mild bilateral rhonchi. No definite areas of consolidation. Levaquin 500 mg by mouth    • History of acute pharyngitis 10/16/2015    10/16/2015--patient presents with approximately 5 day history of sore throat, posterior nasal drainage, cough productive of scant clear phlegm.  No fever, chills, shortness of breath or other systemic signs or  symptoms.  She has fatigue but no myalgias.  Examination reveals boggy nasal mucosa.  No tenderness over the sinuses.  Her pharynx is erythematous but there is no exudate.  Neck without nano   • History of Acute upper respiratory infection 3/14/2016    07/07/2017--patient seen in follow-up by Dr. Mix.  She reports she feels much better and her cough is resolved.  However, she still continues to have some phlegm in her throat that is not discolored.  Examination reveals clear lungs.  I have recommended she try Mucinex over-the-counter and drink plenty of fluids.  06/19/2017--patient was evaluated at the urgent care with a 5 day history of malaise, cough, headache, and fatigue.  She took some Cheratussin which provided some relief.  She was diagnosed as having a viral upper respiratory tract infection and prescription for Cheratussin given.  10/16/2015--patient presents with approximately 5 day history of sore throat, posterior nasal drainage, cough productive of scant clear phlegm.  No fever, chills, shortness of breath or other systemic signs or symptoms.  She has fatigue but no myalgias.  Examination reveals boggy nasal mucosa.  No tenderness over the sinuses.  Her pharynx is erythematous but there is no exudate.  Neck without adenopathy.  Lung exami   • History of anemia 03/11/2015 03/11/2015--CBC is normal with a hemoglobin of 12.2, hematocrit normal at 37.0.   04/25/2014--homocystine and methylmalonic acid are normal. Iron studies are normal, except iron saturation is low at 15%. Observation.   04/14/2014--CBC revealed a low hemoglobin of 11.5, hematocrit low at 34.7. Homocystine, methylmalonic acid, iron studies ordered. Patient is to followup on the phone for the results   • History of atrial flutter, 2003--successful catheter ablation for persistent atrial flutter. 2003 2003--catheter ablation for persistent atrial flutter was successful.   • History of bone density study 01/31/2011     01/31/2011--DEXA scan revealed LS spine T score -2.2, left hip -1.0. Now osteopenia. 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   • History of carotid Doppler/vascular screen. 01/22/2008 01/22/2008--vascular screen was negative for carotid artery plaque, abdominal aorta normal, no PAD.   • History of carpal tunnel syndrome, bilateral 11/19/2009 11/19/2009--nerve conduction study of the upper extremities revealed bilateral carpal tunnel syndrome. January 2010--status post right carpal tunnel release. No surgical treatment on left.   • History of chest x-ray 9/28/2016 09/06/2016--chest x-ray performed by the urgent care for complaints of cough and shortness of breath.  This was compared to previous x-ray 02/15/2014 and is unchanged and reveals no acute disease.   • History of complete AV block, 10/26/2006--permanent pacemaker replacement.  12/18/2000--dual-chamber pacemaker placement for sick sinus syndrome, atrial flutter, AV block after CABG. 12/18/2000 12/18/2000--dual-chamber pacemaker placement for sick sinus syndrome, atrial flutter, AV block that occurred after her bypass surgery. 10/26/2006--status post pacemaker replacement. Medtronic EnRhythm [id number]   • History of drug rash, 01/11/2014--name brand Synthroid 01/11/2014 01/11/2014--patient presented with a diffuse erythematous rash that was very pruritic. Examination consistent with a drug reaction/allergic dermatitis. Believed to be secondary to name brand Synthroid. This was discontinued and patient treated with a Medrol Dosepak.   • History of echocardiogram 01/18/2012 01/18/2012 revealed ejection fraction of 58%, moderate mitral regurgitation.   • History of mammogram 09/10/2003    09/10/2003--normal mammogram.   • History of osteoporosis, June 2001 osteoporosis of the LS spine.  2011--improved to osteopenia only. 2011,2001 01/31/2011--DEXA scan  revealed LS spine T score -2.2, left hip -1.0.  Now osteopenia.   June  2001--DEXA showed osteoporosis of the LS spine.   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 2006--PM replacement.  Medtronic EnRhythm [id number]. 11/30/2015--PM generator change. 11/30/2015 11/30/2015--pacemaker generator change.  10/26/2006--status post pacemaker replacement. Medtronic EnRhythm [id number]  12/18/2000--dual-chamber pacemaker placement for sick sinus syndrome, atrial flutter, AV block that occurred after her bypass surgery.   2000--dual-chamber PM for SSS, A flutter, AVB after CABG. 2006--PM replacement.  Medtronic EnRhythm [id number]. 11/30/2015--PM generator change.   • History of pneumococcal vaccination 03/16/2015 03/16/2015--Prevnar 13 given. Patient's initial pneumococcal vaccination was given after the age of 65 and therefore no further pneumococcal immunization is required. 12/14/2007--Pneumovax given. 12/10/2002--pneumococcal vaccination given.   • History of Zostavax administration 1/17/2008 01/17/2008--Zostavax given at the local drug store.   • Hyperlipidemia 3/14/2016   • Impaired fasting glucose 3/16/2016    03/16/2015--type 2 diabetes is now evolved into impaired fasting glucose.  Hemoglobin A1c 5.7.  Diagnosed January 2010   • Moderate mitral regurgitation, 02/18/2012--moderate MR, EF 58%. 1/18/2012 01/18/2012 revealed ejection fraction of 58%, moderate mitral regurgitation.   • Occlusive coronary artery disease, 12/11/2000--status post 5 vessel CABG 12/11/2000 12/11/2000--status post 5 vessel CABG.  Patient subsequently had chest pain from her sternal wires which were removed.   • Onychomycosis of fingernails 3/14/2016    Patient has had onychomycosis involving her fingernails for several years.  She tries to control it with Ertaczo cream.   • Osteopenia, 01/31/2011--lumbar spine -2.2, left hip -1.0. 6/1/2001 01/31/2011--DEXA scan  revealed LS spine T score -2.2, left hip -1.0.  Now osteopenia.   June 2001--DEXA showed  osteoporosis of the LS spine.   • Pacemaker    • Primary osteoarthritis of both knees 9/28/2016 09/28/2016--patient seen in follow-up and continues to have bilateral knee discomfort particularly with prolonged use.  Right knee is worse than the left.  She understandably does not want have a knee replacement but I think she would benefit from orthopedic evaluation for consideration of cortisone or visco supplementation.  Patient referred to Dr. Wallace.  08/26/2016--x-ray of the right knee reveals very severe degenerative changes involving the medial compartment with marked joint space narrowing and subchondral sclerosis and marginal spurring.  No joint effusion and no acute pathology.   • Questionable History of polymyalgia rheumatica --    This diagnosis somewhat suspect   • Statin intolerance 3/28/2017    Intolerance to multiple statins.   • Status post catheter ablation of atrial flutter, 2002--successful catheter ablation for persistent atrial flutter. 2003 2003--catheter ablation for persistent atrial flutter was successful.   • Subclinical hypothyroidism 5/9/2013    Diagnosed 05/16/2013.   05/09/2013--ultrasound thyroid for new hypothyroidism revealed a small thyroid gland consistent with hypothyroidism.  Mixed nodule present.   • Vitamin D deficiency 3/14/2016         Past Surgical History:   Procedure Laterality Date   • CARDIAC ABLATION  07/02/2003 07/02/2003--catheter ablation for persistent atrial flutter was successful.   • CARDIAC PACEMAKER PLACEMENT  12/18/2000 12/18/2000--dual-chamber pacemaker placement for sick sinus syndrome, atrial flutter, AV block that occurred after her bypass surgery.   • CARPAL TUNNEL RELEASE Right 01/2010 January 2010--status post right carpal tunnel release. No surgical treatment on left.   • CORONARY ARTERY BYPASS GRAFT  12/2000 December 2000--coronary artery bypass x 5   • HX OVARIAN CYSTECTOMY  Remote    Remote ovarian cyst excision.   • INCISIONAL  "BREAST BIOPSY  Remote    Benign   • PACEMAKER REPLACEMENT  10/26/2006    10/26/2006--status post pacemaker replacement. Medtronic EnRhythm [id number]    • PACEMAKER REPLACEMENT  11/30/2015 11/30/2015--pacemaker generator change.         Allergies   Allergen Reactions   • Atorvastatin    • Levothyroxine Sodium    • Other      Seafood   • Penicillins            Current Outpatient Prescriptions:   •  aspirin 81 MG tablet, Take 1 tablet by mouth daily., Disp: , Rfl:   •  Cholecalciferol (VITAMIN D) 1000 UNITS tablet, Take 1 tablet by mouth daily., Disp: , Rfl:   •  Coenzyme Q10 (COQ10) 400 MG capsule, Take 1 capsule by mouth daily. With food, Disp: , Rfl:   •  doxazosin (CARDURA) 4 MG tablet, TAKE ONE TABLET BY MOUTH EVERY MORNING, Disp: 90 tablet, Rfl: 0  •  metoprolol succinate XL (TOPROL-XL) 50 MG 24 hr tablet, TAKE ONE TABLET BY MOUTH DAILY, Disp: 90 tablet, Rfl: 1  •  olmesartan-hydrochlorothiazide (BENICAR HCT) 40-25 MG per tablet, TAKE ONE TABLET BY MOUTH DAILY FOR BLOOD PRESSURE, Disp: 90 tablet, Rfl: 1  •  Red Yeast Rice 600 MG tablet, Take 2 tablets by mouth every day., Disp: , Rfl:       Family History   Problem Relation Age of Onset   • Hyperlipidemia Mother    • Breast cancer Sister    • Diabetes type II Sister          Social History     Social History   • Marital status:      Spouse name: N/A   • Number of children: N/A   • Years of education: N/A     Occupational History   • Retired      Social History Main Topics   • Smoking status: Never Smoker   • Smokeless tobacco: Never Used   • Alcohol use No   • Drug use: No   • Sexual activity: Not Currently     Partners: Male     Other Topics Concern   • Not on file     Social History Narrative         Vitals:    02/28/18 1334   BP: 116/56   Pulse: 64   SpO2: 98%   Weight: 54 kg (119 lb)   Height: 152.4 cm (60\")          Physical Exam:    General: Alert and oriented x 3.  No acute distress.  Normal affect.  HEENT: Pupils equal, round, reactive to light; " extraocular movements intact; sclerae nonicteric; pharynx, ear canals and TMs normal.  Neck: Without JVD, thyromegaly, bruit, or adenopathy.  Lungs: Clear to auscultation in all fields.  Heart: Regular rate and rhythm without murmur, rub, gallop, or click.  Abdomen: Soft, nontender, without hepatosplenomegaly or hernia.  Bowel sounds normal.  : Deferred.  Rectal: Deferred.  Extremities: Without clubbing, cyanosis, edema, or pulse deficit.  Neurologic: Intact without focal deficit.  Normal station and gait observed during ingress and egress from the examination room.  Skin: Without significant lesion.  Musculoskeletal: Unremarkable.      Lab/other results:    I reviewed the documentation from the emergency room including the history and physical, lab work, and radiographic studies.    Assessment/Plan:     Diagnosis Plan   1. Hospital discharge follow-up     2. Episodic lightheadedness     3. Benign essential hypertension     4. Hyponatremia       Patient presents in Hospital emergency room follow-up after presenting with episodic lightheadedness that sounds somewhat postural in nature.  She has hypertension and her blood pressure runs a little on the low side and that may be the cause of her symptoms.  This is particularly true given the fact that she is taking doxazosin for the hypertension.  She was noted to have a low sodium of uncertain etiology but this may be due to the fact that she had somewhat poor by mouth intake prior to her presentation.    Plan is as follows: Check CMP today.  Patient will follow-up on the phone for the results.  I will have her discontinue the doxazosin/Cardura and make no other changes in her regimen.  I will have her follow-up in about 3-4 weeks to reassess the blood pressure as well as her symptoms.        Procedures

## 2018-03-01 LAB
ALBUMIN SERPL-MCNC: 4.1 G/DL (ref 3.5–5.2)
ALBUMIN/GLOB SERPL: 1.9 G/DL
ALP SERPL-CCNC: 74 U/L (ref 39–117)
ALT SERPL-CCNC: 11 U/L (ref 1–33)
AST SERPL-CCNC: 16 U/L (ref 1–32)
BILIRUB SERPL-MCNC: 0.2 MG/DL (ref 0.1–1.2)
BUN SERPL-MCNC: 23 MG/DL (ref 8–23)
BUN/CREAT SERPL: 24 (ref 7–25)
CALCIUM SERPL-MCNC: 9.5 MG/DL (ref 8.2–9.6)
CHLORIDE SERPL-SCNC: 93 MMOL/L (ref 98–107)
CO2 SERPL-SCNC: 31.8 MMOL/L (ref 22–29)
CREAT SERPL-MCNC: 0.96 MG/DL (ref 0.57–1)
GFR SERPLBLD CREATININE-BSD FMLA CKD-EPI: 54 ML/MIN/1.73
GFR SERPLBLD CREATININE-BSD FMLA CKD-EPI: 66 ML/MIN/1.73
GLOBULIN SER CALC-MCNC: 2.2 GM/DL
GLUCOSE SERPL-MCNC: 126 MG/DL (ref 65–99)
POTASSIUM SERPL-SCNC: 4.5 MMOL/L (ref 3.5–5.2)
PROT SERPL-MCNC: 6.3 G/DL (ref 6–8.5)
SODIUM SERPL-SCNC: 135 MMOL/L (ref 136–145)

## 2018-03-21 ENCOUNTER — OFFICE VISIT (OUTPATIENT)
Dept: INTERNAL MEDICINE | Facility: CLINIC | Age: 83
End: 2018-03-21

## 2018-03-21 VITALS
OXYGEN SATURATION: 98 % | HEIGHT: 60 IN | BODY MASS INDEX: 23.36 KG/M2 | SYSTOLIC BLOOD PRESSURE: 146 MMHG | DIASTOLIC BLOOD PRESSURE: 56 MMHG | HEART RATE: 85 BPM | WEIGHT: 119 LBS

## 2018-03-21 DIAGNOSIS — R51.9 NEW ONSET OF HEADACHES AFTER AGE 50: Primary | ICD-10-CM

## 2018-03-21 DIAGNOSIS — I10 BENIGN ESSENTIAL HYPERTENSION: Chronic | ICD-10-CM

## 2018-03-21 DIAGNOSIS — E87.1 HYPONATREMIA: ICD-10-CM

## 2018-03-21 PROBLEM — Z09 HOSPITAL DISCHARGE FOLLOW-UP: Status: RESOLVED | Noted: 2018-02-28 | Resolved: 2018-03-21

## 2018-03-21 PROBLEM — R42 EPISODIC LIGHTHEADEDNESS: Status: RESOLVED | Noted: 2018-02-28 | Resolved: 2018-03-21

## 2018-03-21 PROCEDURE — 99214 OFFICE O/P EST MOD 30 MIN: CPT | Performed by: INTERNAL MEDICINE

## 2018-03-21 RX ORDER — DOXAZOSIN MESYLATE 4 MG/1
TABLET ORAL
Qty: 90 TABLET | Refills: 3
Start: 2018-03-21 | End: 2018-06-04 | Stop reason: SDUPTHER

## 2018-03-21 NOTE — PROGRESS NOTES
03/21/2018    Patient Information  Lora Serrano                                                                                          [address] [facility] [address]      7/15/1926  [phone]      Chief Complaint:     Pain of new onset headaches and concern over blood pressure.  Follow-up hyponatremia.    History of Present Illness:    Patient with a recent hospitalization for complaints of lightheadedness and profound hyponatremia presents today with complaints of new onset headaches which may be related to recent discontinuation of the medication, specifically Cardura.  This will be described in detail below.  Patient also has complaints of easy bruisability and that is because she is on aspirin and her blood vessels are quite fragile at the age of 89.  Her past medical history reviewed and updated where necessary including health maintenance parameters.  This reveals she is up-to-date.    The history regarding recent hospitalization for lightheadedness and hyponatremia as well as history of recent medication adjustment and new onset headaches is as follows:    03/21/2018--patient reports development of new onset headaches which are unusual for her.  She has been concerned that her blood pressure is elevated after discontinuation of Cardura and indeed it is at 146/56.  The headache is located in the frontal region and described as a dull ache.  It is not piercing or severe.  No changes in vision or hearing and no other neurologic symptoms associated with this.  I think it may be very likely that her headaches are related to the discontinuation of the Cardura and subsequent elevation of blood pressure which she is not used to.  I think it would be reasonable to restart the Cardura 4 mg but at half a pill per day initially.  I will have her follow-up in about 3-4 weeks to reassess the blood pressure and the headaches and we can check her sodium levels at that  time.    02/28/2018--patient seen in follow-up and reports she is feeling better.  The lightheadedness/dizziness has resolved.  She feels fairly well but notes that she moves somewhat slowly compared to previous and she attributes this to age.  Upon review of the emergency room records I noticed that she did present with a low-grade fever of 99.3.  On examination today she looks good and her lungs are clear.  Heart is regular.  There is no significant edema.  No neurologic deficit.  We will check a CMP today to check on the low sodium and I will contact patient with the results tomorrow and possible further instructions.  Also her blood pressure has been running somewhat low on several occasions.  She is currently taking Benicar HCT 40/25, one half by mouth daily along with metoprolol succinate extended release 50 mg per day and Cardura 4 mg per day.  Given her low blood pressure and history of lightheadedness, we will discontinue the Cardura and reassess her blood pressure in about 3-4 weeks.  Check CMP today.    02/14/2018--patient presented to the emergency room after being evaluated at the urgent care and told to go to the ER.  She presented with a three-day history of intermittent lightheadedness and dizziness and the symptoms had worsened over the past day.  He was subsequently removed decreased appetite and fatigue.  Her symptoms were worse with bending over and this gave her a vague sense of moving.  The symptoms seemed to improve when she was still.  She had associated sinus drainage and congestion but denied cough, fever, shortness of breath, chest pain, outpatient, urinary or gastrointestinal symptoms, lower extremity swelling or abdominal pain.  There was no sign of unilateral weakness or facial droop or speech abnormalities.  Workup in the emergency room included a CT scan of the brain which revealed nothing acute.  Her CMP showed a low sodium of 129 and her chloride was low at 89.  BUN and creatinine  were normal.  CBC revealed a mild elevation of white count at 11.97 with a little left shift.  Chest x-ray revealed atelectasis at the left base but nothing acute.  Electrocardiogram revealed a paced rhythm with wide complex QRS.  Patient was discharged from the emergency room with instructions to follow-up with primary care doctor within the next week or so.  He was also told to eat 6 meals per day according to the patient.    Review of Systems   Constitution: Negative.   Eyes: Negative.    Cardiovascular: Negative.    Respiratory: Negative.    Endocrine: Negative.    Hematologic/Lymphatic: Negative.    Skin: Negative.    Musculoskeletal: Negative.    Gastrointestinal: Negative.    Genitourinary: Negative.    Neurological: Positive for headaches.   Psychiatric/Behavioral: Negative.    Allergic/Immunologic: Negative.        Active Problems:    Patient Active Problem List   Diagnosis   • Occlusive coronary artery disease, 12/11/2000--status post 5 vessel CABG   • Subclinical hypothyroidism   • Impaired fasting glucose   • Onychomycosis of fingernails   • Osteopenia, 01/31/2011--lumbar spine -2.2, left hip -1.0.   • Vitamin D deficiency   • Hyperlipidemia   • Benign essential hypertension   • Generalized osteoarthritis of multiple sites   • Therapeutic drug monitoring   • History of atrial flutter, 2003--successful catheter ablation for persistent atrial flutter.   • History of complete AV block, 2000--dual-chamber PM for SSS, A flutter, AVB after CABG. 2006--PM replacement.  Medtronic EnRhythm [id number]. 11/30/2015--PM generator change.   • Moderate mitral regurgitation, 02/18/2012--moderate MR, EF 58%.   • Status post catheter ablation of atrial flutter, 2002--successful catheter ablation for persistent atrial flutter.   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 2006--PM replacement.  Medtronic EnRhythm [id number]. 11/30/2015--PM generator change.   • Primary  osteoarthritis of both knees   • Statin intolerance   • Hyponatremia   • New onset of headaches after age 50         Past Medical History:   Diagnosis Date   • Benign essential hypertension 3/14/2016   • Generalized osteoarthritis of multiple sites 3/14/2016   • History of acute bronchitis 02/09/2015 02/09/2015--patient presents with a 5 or 6 day history of chest congestion, cough productive of yellow/green phlegm that has cleared some over the past day or so. No significant worsening shortness of breath. She has had some chills but no documented fever. Not much in the way of ENT symptoms. Examination reveals mild bilateral rhonchi. No definite areas of consolidation. Levaquin 500 mg by mouth    • History of acute pharyngitis 10/16/2015    10/16/2015--patient presents with approximately 5 day history of sore throat, posterior nasal drainage, cough productive of scant clear phlegm.  No fever, chills, shortness of breath or other systemic signs or symptoms.  She has fatigue but no myalgias.  Examination reveals boggy nasal mucosa.  No tenderness over the sinuses.  Her pharynx is erythematous but there is no exudate.  Neck without nano   • History of Acute upper respiratory infection 3/14/2016    07/07/2017--patient seen in follow-up by Dr. Mix.  She reports she feels much better and her cough is resolved.  However, she still continues to have some phlegm in her throat that is not discolored.  Examination reveals clear lungs.  I have recommended she try Mucinex over-the-counter and drink plenty of fluids.  06/19/2017--patient was evaluated at the urgent care with a 5 day history of malaise, cough, headache, and fatigue.  She took some Cheratussin which provided some relief.  She was diagnosed as having a viral upper respiratory tract infection and prescription for Cheratussin given.  10/16/2015--patient presents with approximately 5 day history of sore throat, posterior nasal drainage, cough productive of scant  clear phlegm.  No fever, chills, shortness of breath or other systemic signs or symptoms.  She has fatigue but no myalgias.  Examination reveals boggy nasal mucosa.  No tenderness over the sinuses.  Her pharynx is erythematous but there is no exudate.  Neck without adenopathy.  Lung exami   • History of anemia 03/11/2015 03/11/2015--CBC is normal with a hemoglobin of 12.2, hematocrit normal at 37.0.   04/25/2014--homocystine and methylmalonic acid are normal. Iron studies are normal, except iron saturation is low at 15%. Observation.   04/14/2014--CBC revealed a low hemoglobin of 11.5, hematocrit low at 34.7. Homocystine, methylmalonic acid, iron studies ordered. Patient is to followup on the phone for the results   • History of atrial flutter, 2003--successful catheter ablation for persistent atrial flutter. 2003 2003--catheter ablation for persistent atrial flutter was successful.   • History of bone density study 01/31/2011 01/31/2011--DEXA scan revealed LS spine T score -2.2, left hip -1.0. Now osteopenia. 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   • History of carotid Doppler/vascular screen. 01/22/2008 01/22/2008--vascular screen was negative for carotid artery plaque, abdominal aorta normal, no PAD.   • History of carpal tunnel syndrome, bilateral 11/19/2009 11/19/2009--nerve conduction study of the upper extremities revealed bilateral carpal tunnel syndrome. January 2010--status post right carpal tunnel release. No surgical treatment on left.   • History of chest x-ray 9/28/2016 09/06/2016--chest x-ray performed by the urgent care for complaints of cough and shortness of breath.  This was compared to previous x-ray 02/15/2014 and is unchanged and reveals no acute disease.   • History of complete AV block, 10/26/2006--permanent pacemaker replacement.  12/18/2000--dual-chamber pacemaker placement for sick sinus syndrome, atrial flutter, AV block after CABG. 12/18/2000     12/18/2000--dual-chamber pacemaker placement for sick sinus syndrome, atrial flutter, AV block that occurred after her bypass surgery. 10/26/2006--status post pacemaker replacement. Medtronic EnRhythm [id number]   • History of drug rash, 01/11/2014--name brand Synthroid 01/11/2014 01/11/2014--patient presented with a diffuse erythematous rash that was very pruritic. Examination consistent with a drug reaction/allergic dermatitis. Believed to be secondary to name brand Synthroid. This was discontinued and patient treated with a Medrol Dosepak.   • History of echocardiogram 01/18/2012 01/18/2012 revealed ejection fraction of 58%, moderate mitral regurgitation.   • History of mammogram 09/10/2003    09/10/2003--normal mammogram.   • History of osteoporosis, June 2001 osteoporosis of the LS spine.  2011--improved to osteopenia only. 2011,2001 01/31/2011--DEXA scan  revealed LS spine T score -2.2, left hip -1.0.  Now osteopenia.   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 2006--PM replacement.  Medtronic EnRhythm [id number]. 11/30/2015--PM generator change. 11/30/2015 11/30/2015--pacemaker generator change.  10/26/2006--status post pacemaker replacement. Medtronic EnRhythm [id number]  12/18/2000--dual-chamber pacemaker placement for sick sinus syndrome, atrial flutter, AV block that occurred after her bypass surgery.   2000--dual-chamber PM for SSS, A flutter, AVB after CABG. 2006--PM replacement.  Medtronic EnRhythm [id number]. 11/30/2015--PM generator change.   • History of pneumococcal vaccination 03/16/2015 03/16/2015--Prevnar 13 given. Patient's initial pneumococcal vaccination was given after the age of 65 and therefore no further pneumococcal immunization is required. 12/14/2007--Pneumovax given. 12/10/2002--pneumococcal vaccination given.   • History of Zostavax administration 1/17/2008     01/17/2008--Zostavax given at the local drug store.   • Hyperlipidemia 3/14/2016   • Impaired fasting glucose 3/16/2016    03/16/2015--type 2 diabetes is now evolved into impaired fasting glucose.  Hemoglobin A1c 5.7.  Diagnosed January 2010   • Moderate mitral regurgitation, 02/18/2012--moderate MR, EF 58%. 1/18/2012 01/18/2012 revealed ejection fraction of 58%, moderate mitral regurgitation.   • Occlusive coronary artery disease, 12/11/2000--status post 5 vessel CABG 12/11/2000 12/11/2000--status post 5 vessel CABG.  Patient subsequently had chest pain from her sternal wires which were removed.   • Onychomycosis of fingernails 3/14/2016    Patient has had onychomycosis involving her fingernails for several years.  She tries to control it with Ertaczo cream.   • Osteopenia, 01/31/2011--lumbar spine -2.2, left hip -1.0. 6/1/2001 01/31/2011--DEXA scan  revealed LS spine T score -2.2, left hip -1.0.  Now osteopenia.   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   • Pacemaker    • Primary osteoarthritis of both knees 9/28/2016 09/28/2016--patient seen in follow-up and continues to have bilateral knee discomfort particularly with prolonged use.  Right knee is worse than the left.  She understandably does not want have a knee replacement but I think she would benefit from orthopedic evaluation for consideration of cortisone or visco supplementation.  Patient referred to Dr. Wallace.  08/26/2016--x-ray of the right knee reveals very severe degenerative changes involving the medial compartment with marked joint space narrowing and subchondral sclerosis and marginal spurring.  No joint effusion and no acute pathology.   • Questionable History of polymyalgia rheumatica --    This diagnosis somewhat suspect   • Statin intolerance 3/28/2017    Intolerance to multiple statins.   • Status post catheter ablation of atrial flutter, 2002--successful catheter ablation for persistent atrial flutter. 2003     2003--catheter ablation for persistent atrial flutter was successful.   • Subclinical hypothyroidism 5/9/2013    Diagnosed 05/16/2013.   05/09/2013--ultrasound thyroid for new hypothyroidism revealed a small thyroid gland consistent with hypothyroidism.  Mixed nodule present.   • Vitamin D deficiency 3/14/2016         Past Surgical History:   Procedure Laterality Date   • CARDIAC ABLATION  07/02/2003 07/02/2003--catheter ablation for persistent atrial flutter was successful.   • CARDIAC PACEMAKER PLACEMENT  12/18/2000 12/18/2000--dual-chamber pacemaker placement for sick sinus syndrome, atrial flutter, AV block that occurred after her bypass surgery.   • CARPAL TUNNEL RELEASE Right 01/2010 January 2010--status post right carpal tunnel release. No surgical treatment on left.   • CORONARY ARTERY BYPASS GRAFT  12/2000 December 2000--coronary artery bypass x 5   • HX OVARIAN CYSTECTOMY  Remote    Remote ovarian cyst excision.   • INCISIONAL BREAST BIOPSY  Remote    Benign   • PACEMAKER REPLACEMENT  10/26/2006    10/26/2006--status post pacemaker replacement. Medtronic EnRhythm [id number]    • PACEMAKER REPLACEMENT  11/30/2015 11/30/2015--pacemaker generator change.         Allergies   Allergen Reactions   • Atorvastatin    • Levothyroxine Sodium    • Other      Seafood   • Penicillins            Current Outpatient Prescriptions:   •  aspirin 81 MG tablet, Take 1 tablet by mouth daily., Disp: , Rfl:   •  Cholecalciferol (VITAMIN D) 1000 UNITS tablet, Take 1 tablet by mouth daily., Disp: , Rfl:   •  Coenzyme Q10 (COQ10) 400 MG capsule, Take 1 capsule by mouth daily. With food, Disp: , Rfl:   •  metoprolol succinate XL (TOPROL-XL) 50 MG 24 hr tablet, TAKE ONE TABLET BY MOUTH DAILY, Disp: 90 tablet, Rfl: 1  •  olmesartan-hydrochlorothiazide (BENICAR HCT) 40-25 MG per tablet, TAKE ONE TABLET BY MOUTH DAILY FOR BLOOD PRESSURE, Disp: 90 tablet, Rfl: 1  •  Red Yeast Rice 600 MG tablet, Take 2 tablets by mouth every  "day., Disp: , Rfl:       Family History   Problem Relation Age of Onset   • Hyperlipidemia Mother    • Breast cancer Sister    • Diabetes type II Sister          Social History     Social History   • Marital status:      Spouse name: N/A   • Number of children: N/A   • Years of education: N/A     Occupational History   • Retired      Social History Main Topics   • Smoking status: Never Smoker   • Smokeless tobacco: Never Used   • Alcohol use No   • Drug use: No   • Sexual activity: Not Currently     Partners: Male     Other Topics Concern   • Not on file     Social History Narrative   • No narrative on file         Vitals:    03/21/18 1340   BP: 146/56   Pulse: 85   SpO2: 98%   Weight: 54 kg (119 lb)   Height: 152.4 cm (60\")          Physical Exam:    General: Alert and oriented x 3.  No acute distress.  Normal affect.  HEENT: Pupils equal, round, reactive to light; extraocular movements intact; sclerae nonicteric; pharynx, ear canals and TMs normal.  Neck: Without JVD, thyromegaly, bruit, or adenopathy.  Lungs: Clear to auscultation in all fields.  Heart: Regular rate and rhythm without murmur, rub, gallop, or click.  Abdomen: Soft, nontender, without hepatosplenomegaly or hernia.  Bowel sounds normal.  : Deferred.  Rectal: Deferred.  Extremities: Without clubbing, cyanosis, edema, or pulse deficit.  Neurologic: Intact without focal deficit.  Normal station and gait observed during ingress and egress from the examination room.  Skin: Without significant lesion, except several bruises/ecchymoses of her lower extremities in the pretibial regions.  Musculoskeletal: Unremarkable.      Lab/other results:    Comprehensive Metabolic Panel   Order: [id number]   Status:  Final result   Visible to patient:  No (Not Released) Dx:  Benign essential hypertension; Hypona...   Notes Recorded by Georgia Vasquez MA on 3/1/2018 at 1:30 PM  Spoke with pt.  ------    Notes Recorded by Adilson Mix MD on 3/1/2018 at " 12:04 PM  Call patient with abnormal results.  Inform Patient that her sodium is much better and nearly normal.  Also tell her that after reviewing the chart, she does have a history of slightly low sodium in the past.  Also tell her the candy she made was excellent and thank you very much for it in the very nice thank you card.    Ref Range & Units 3wk ago   Glucose 65 - 99 mg/dL 126     BUN 8 - 23 mg/dL 23    Creatinine 0.57 - 1.00 mg/dL 0.96    eGFR Non African Am >60 mL/min/1.73 54     Comments: The MDRD GFR formula is only valid for adults with stable   renal function between ages 18 and 70.    eGFR African Am >60 mL/min/1.73 66    BUN/Creatinine Ratio 7.0 - 25.0 24.0    Sodium 136 - 145 mmol/L 135     Potassium 3.5 - 5.2 mmol/L 4.5    Chloride 98 - 107 mmol/L 93     Total CO2 22.0 - 29.0 mmol/L 31.8     Calcium 8.2 - 9.6 mg/dL 9.5    Total Protein 6.0 - 8.5 g/dL 6.3    Albumin 3.50 - 5.20 g/dL 4.10    Globulin gm/dL 2.2    A/G Ratio g/dL 1.9    Total Bilirubin 0.1 - 1.2 mg/dL 0.2    Alkaline Phosphatase 39 - 117 U/L 74    AST (SGOT) 1 - 32 U/L 16    ALT (SGPT) 1 - 33 U/L 11    Resulting Agency  [email or web address]               Assessment/Plan:     Diagnosis Plan   1. New onset of headaches after age 50     2. Benign essential hypertension     3. Hyponatremia         Patient with a history of hypertension and new onset headaches that are quite unusual for her and I think that may very well be related to the recent medication change and subsequent elevation of her blood pressure.  Her sodium levels have improved but not normalized according to the latest blood work although this was about 3 weeks ago.  We will need to monitor sodium closely here over the next several weeks.    Plan is as follows: Restart Cardura 4 mg but at a half a pill per day.  I will have her follow-up in about 3-4 weeks to reassess the blood pressure and headache symptoms.  We can check her sodium levels after the visit.      Procedures

## 2018-04-12 ENCOUNTER — CLINICAL SUPPORT NO REQUIREMENTS (OUTPATIENT)
Dept: CARDIOLOGY | Facility: CLINIC | Age: 83
End: 2018-04-12

## 2018-04-12 DIAGNOSIS — I49.5 SICK SINUS SYNDROME (HCC): Primary | ICD-10-CM

## 2018-04-12 PROCEDURE — 93288 INTERROG EVL PM/LDLS PM IP: CPT | Performed by: INTERNAL MEDICINE

## 2018-04-19 ENCOUNTER — OFFICE VISIT (OUTPATIENT)
Dept: INTERNAL MEDICINE | Facility: CLINIC | Age: 83
End: 2018-04-19

## 2018-04-19 VITALS
OXYGEN SATURATION: 97 % | BODY MASS INDEX: 23.95 KG/M2 | WEIGHT: 122 LBS | SYSTOLIC BLOOD PRESSURE: 130 MMHG | DIASTOLIC BLOOD PRESSURE: 52 MMHG | HEIGHT: 60 IN | HEART RATE: 94 BPM

## 2018-04-19 DIAGNOSIS — I10 BENIGN ESSENTIAL HYPERTENSION: Chronic | ICD-10-CM

## 2018-04-19 DIAGNOSIS — E87.1 HYPONATREMIA: ICD-10-CM

## 2018-04-19 DIAGNOSIS — R51.9 NEW ONSET OF HEADACHES AFTER AGE 50: Primary | ICD-10-CM

## 2018-04-19 PROCEDURE — 99213 OFFICE O/P EST LOW 20 MIN: CPT | Performed by: INTERNAL MEDICINE

## 2018-04-19 NOTE — PROGRESS NOTES
04/19/2018    Patient Information  Lora Serrano                                                                                          [id number] [facility] [address]      7/15/1926  [phone]      Chief Complaint:     Follow-up new-onset headaches, uncontrolled hypertension, hyponatremia.  No new acute complaints.  Patient feels well.    History of Present Illness:    Patient with a history of coronary artery disease, hypothyroidism, impaired fasting glucose, hyperlipidemia, hypertension, history of atrial flutter, recent complaints of headache after medication change.  She presents today to follow up and this will be described below.  Past medical history reviewed and updated where necessary including health maintenance parameters.  This reveals she is up-to-date.    The history regarding new onset headaches, poorly controlled hypertension, hyponatremia:    04/19/2018--patient seen in follow-up and reports her headaches have resolved.  Her blood pressure is excellent at 130/52.  We will check a CMP today to assess a low sodium and patient will follow-up on the phone for the results.  Tentatively, no changes to current medical regimen.    03/21/2018--patient reports development of new onset headaches which are unusual for her.  She has been concerned that her blood pressure is elevated after discontinuation of Cardura and indeed it is at 146/56.  The headache is located in the frontal region and described as a dull ache.  It is not piercing or severe.  No changes in vision or hearing and no other neurologic symptoms associated with this.  I think it may be very likely that her headaches are related to the discontinuation of the Cardura and subsequent elevation of blood pressure which she is not used to.  I think it would be reasonable to restart the Cardura 4 mg but at half a pill per day initially.  I will have her follow-up in about 3-4 weeks to reassess the blood pressure and the  headaches and we can check her sodium levels at that time.    02/28/2018--patient seen in follow-up and reports she is feeling better.  The lightheadedness/dizziness has resolved.  She feels fairly well but notes that she moves somewhat slowly compared to previous and she attributes this to age.  Upon review of the emergency room records I noticed that she did present with a low-grade fever of 99.3.  On examination today she looks good and her lungs are clear.  Heart is regular.  There is no significant edema.  No neurologic deficit.  We will check a CMP today to check on the low sodium and I will contact patient with the results tomorrow and possible further instructions.  Also her blood pressure has been running somewhat low on several occasions.  She is currently taking Benicar HCT 40/25, one half by mouth daily along with metoprolol succinate extended release 50 mg per day and Cardura 4 mg per day.  Given her low blood pressure and history of lightheadedness, we will discontinue the Cardura and reassess her blood pressure in about 3-4 weeks.  Check CMP today.    02/14/2018--patient presented to the emergency room after being evaluated at the urgent care and told to go to the ER.  She presented with a three-day history of intermittent lightheadedness and dizziness and the symptoms had worsened over the past day.  He was subsequently removed decreased appetite and fatigue.  Her symptoms were worse with bending over and this gave her a vague sense of moving.  The symptoms seemed to improve when she was still.  She had associated sinus drainage and congestion but denied cough, fever, shortness of breath, chest pain, outpatient, urinary or gastrointestinal symptoms, lower extremity swelling or abdominal pain.  There was no sign of unilateral weakness or facial droop or speech abnormalities.  Workup in the emergency room included a CT scan of the brain which revealed nothing acute.  Her CMP showed a low sodium of 129  and her chloride was low at 89.  BUN and creatinine were normal.  CBC revealed a mild elevation of white count at 11.97 with a little left shift.  Chest x-ray revealed atelectasis at the left base but nothing acute.  Electrocardiogram revealed a paced rhythm with wide complex QRS.  Patient was discharged from the emergency room with instructions to follow-up with primary care doctor within the next week or so.  He was also told to eat 6 meals per day according to the patient.    Review of Systems   Constitution: Negative.   HENT: Negative.    Eyes: Negative.    Cardiovascular: Negative.    Respiratory: Negative.    Endocrine: Negative.    Hematologic/Lymphatic: Negative.    Skin: Negative.    Musculoskeletal: Negative.    Gastrointestinal: Negative.    Genitourinary: Negative.    Neurological: Negative.    Psychiatric/Behavioral: Negative.    Allergic/Immunologic: Negative.        Active Problems:    Patient Active Problem List   Diagnosis   • Occlusive coronary artery disease, 12/11/2000--status post 5 vessel CABG   • Subclinical hypothyroidism   • Impaired fasting glucose   • Onychomycosis of fingernails   • Osteopenia, 01/31/2011--lumbar spine -2.2, left hip -1.0.   • Vitamin D deficiency   • Hyperlipidemia   • Benign essential hypertension   • Generalized osteoarthritis of multiple sites   • Therapeutic drug monitoring   • History of atrial flutter, 2003--successful catheter ablation for persistent atrial flutter.   • History of complete AV block, 2000--dual-chamber PM for SSS, A flutter, AVB after CABG. 2006--PM replacement.  Medtronic EnRhythm [id number]. 11/30/2015--PM generator change.   • Moderate mitral regurgitation, 02/18/2012--moderate MR, EF 58%.   • Status post catheter ablation of atrial flutter, 2002--successful catheter ablation for persistent atrial flutter.   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 2006--PM replacement.  Medtronic EnRhythm  [id number]. 11/30/2015--PM generator change.   • Primary osteoarthritis of both knees   • Statin intolerance   • Hyponatremia   • New onset of headaches after age 50         Past Medical History:   Diagnosis Date   • Benign essential hypertension 3/14/2016   • Generalized osteoarthritis of multiple sites 3/14/2016   • History of acute bronchitis 02/09/2015 02/09/2015--patient presents with a 5 or 6 day history of chest congestion, cough productive of yellow/green phlegm that has cleared some over the past day or so. No significant worsening shortness of breath. She has had some chills but no documented fever. Not much in the way of ENT symptoms. Examination reveals mild bilateral rhonchi. No definite areas of consolidation. Levaquin 500 mg by mouth    • History of acute pharyngitis 10/16/2015    10/16/2015--patient presents with approximately 5 day history of sore throat, posterior nasal drainage, cough productive of scant clear phlegm.  No fever, chills, shortness of breath or other systemic signs or symptoms.  She has fatigue but no myalgias.  Examination reveals boggy nasal mucosa.  No tenderness over the sinuses.  Her pharynx is erythematous but there is no exudate.  Neck without nano   • History of Acute upper respiratory infection 3/14/2016    07/07/2017--patient seen in follow-up by Dr. Mix.  She reports she feels much better and her cough is resolved.  However, she still continues to have some phlegm in her throat that is not discolored.  Examination reveals clear lungs.  I have recommended she try Mucinex over-the-counter and drink plenty of fluids.  06/19/2017--patient was evaluated at the urgent care with a 5 day history of malaise, cough, headache, and fatigue.  She took some Cheratussin which provided some relief.  She was diagnosed as having a viral upper respiratory tract infection and prescription for Cheratussin given.  10/16/2015--patient presents with approximately 5 day history of sore throat,  posterior nasal drainage, cough productive of scant clear phlegm.  No fever, chills, shortness of breath or other systemic signs or symptoms.  She has fatigue but no myalgias.  Examination reveals boggy nasal mucosa.  No tenderness over the sinuses.  Her pharynx is erythematous but there is no exudate.  Neck without adenopathy.  Lung exami   • History of anemia 03/11/2015 03/11/2015--CBC is normal with a hemoglobin of 12.2, hematocrit normal at 37.0.   04/25/2014--homocystine and methylmalonic acid are normal. Iron studies are normal, except iron saturation is low at 15%. Observation.   04/14/2014--CBC revealed a low hemoglobin of 11.5, hematocrit low at 34.7. Homocystine, methylmalonic acid, iron studies ordered. Patient is to followup on the phone for the results   • History of atrial flutter, 2003--successful catheter ablation for persistent atrial flutter. 2003 2003--catheter ablation for persistent atrial flutter was successful.   • History of bone density study 01/31/2011 01/31/2011--DEXA scan revealed LS spine T score -2.2, left hip -1.0. Now osteopenia. 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   • History of carotid Doppler/vascular screen. 01/22/2008 01/22/2008--vascular screen was negative for carotid artery plaque, abdominal aorta normal, no PAD.   • History of carpal tunnel syndrome, bilateral 11/19/2009 11/19/2009--nerve conduction study of the upper extremities revealed bilateral carpal tunnel syndrome. January 2010--status post right carpal tunnel release. No surgical treatment on left.   • History of chest x-ray 9/28/2016 09/06/2016--chest x-ray performed by the urgent care for complaints of cough and shortness of breath.  This was compared to previous x-ray 02/15/2014 and is unchanged and reveals no acute disease.   • History of complete AV block, 10/26/2006--permanent pacemaker replacement.  12/18/2000--dual-chamber pacemaker placement for sick sinus syndrome, atrial  flutter, AV block after CABG. 12/18/2000 12/18/2000--dual-chamber pacemaker placement for sick sinus syndrome, atrial flutter, AV block that occurred after her bypass surgery. 10/26/2006--status post pacemaker replacement. Medtronic EnRhythm [id number]   • History of drug rash, 01/11/2014--name brand Synthroid 01/11/2014 01/11/2014--patient presented with a diffuse erythematous rash that was very pruritic. Examination consistent with a drug reaction/allergic dermatitis. Believed to be secondary to name brand Synthroid. This was discontinued and patient treated with a Medrol Dosepak.   • History of echocardiogram 01/18/2012 01/18/2012 revealed ejection fraction of 58%, moderate mitral regurgitation.   • History of mammogram 09/10/2003    09/10/2003--normal mammogram.   • History of osteoporosis, June 2001 osteoporosis of the LS spine.  2011--improved to osteopenia only. 2011,2001 01/31/2011--DEXA scan  revealed LS spine T score -2.2, left hip -1.0.  Now osteopenia.   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 2006--PM replacement.  Medtronic EnRhythm [id number]. 11/30/2015--PM generator change. 11/30/2015 11/30/2015--pacemaker generator change.  10/26/2006--status post pacemaker replacement. Medtronic EnRhythm [id number]  12/18/2000--dual-chamber pacemaker placement for sick sinus syndrome, atrial flutter, AV block that occurred after her bypass surgery.   2000--dual-chamber PM for SSS, A flutter, AVB after CABG. 2006--PM replacement.  Medtronic EnRhythm [id number]. 11/30/2015--PM generator change.   • History of pneumococcal vaccination 03/16/2015 03/16/2015--Prevnar 13 given. Patient's initial pneumococcal vaccination was given after the age of 65 and therefore no further pneumococcal immunization is required. 12/14/2007--Pneumovax given. 12/10/2002--pneumococcal vaccination given.   • History of Zostavax  administration 1/17/2008 01/17/2008--Zostavax given at the local drug store.   • Hyperlipidemia 3/14/2016   • Impaired fasting glucose 3/16/2016    03/16/2015--type 2 diabetes is now evolved into impaired fasting glucose.  Hemoglobin A1c 5.7.  Diagnosed January 2010   • Moderate mitral regurgitation, 02/18/2012--moderate MR, EF 58%. 1/18/2012 01/18/2012 revealed ejection fraction of 58%, moderate mitral regurgitation.   • Occlusive coronary artery disease, 12/11/2000--status post 5 vessel CABG 12/11/2000 12/11/2000--status post 5 vessel CABG.  Patient subsequently had chest pain from her sternal wires which were removed.   • Onychomycosis of fingernails 3/14/2016    Patient has had onychomycosis involving her fingernails for several years.  She tries to control it with Ertaczo cream.   • Osteopenia, 01/31/2011--lumbar spine -2.2, left hip -1.0. 6/1/2001 01/31/2011--DEXA scan  revealed LS spine T score -2.2, left hip -1.0.  Now osteopenia.   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   • Pacemaker    • Primary osteoarthritis of both knees 9/28/2016 09/28/2016--patient seen in follow-up and continues to have bilateral knee discomfort particularly with prolonged use.  Right knee is worse than the left.  She understandably does not want have a knee replacement but I think she would benefit from orthopedic evaluation for consideration of cortisone or visco supplementation.  Patient referred to Dr. Wallace.  08/26/2016--x-ray of the right knee reveals very severe degenerative changes involving the medial compartment with marked joint space narrowing and subchondral sclerosis and marginal spurring.  No joint effusion and no acute pathology.   • Questionable History of polymyalgia rheumatica --    This diagnosis somewhat suspect   • Statin intolerance 3/28/2017    Intolerance to multiple statins.   • Status post catheter ablation of atrial flutter, 2002--successful catheter ablation for persistent  atrial flutter. 2003 2003--catheter ablation for persistent atrial flutter was successful.   • Subclinical hypothyroidism 5/9/2013    Diagnosed 05/16/2013.   05/09/2013--ultrasound thyroid for new hypothyroidism revealed a small thyroid gland consistent with hypothyroidism.  Mixed nodule present.   • Vitamin D deficiency 3/14/2016         Past Surgical History:   Procedure Laterality Date   • CARDIAC ABLATION  07/02/2003 07/02/2003--catheter ablation for persistent atrial flutter was successful.   • CARDIAC PACEMAKER PLACEMENT  12/18/2000 12/18/2000--dual-chamber pacemaker placement for sick sinus syndrome, atrial flutter, AV block that occurred after her bypass surgery.   • CARPAL TUNNEL RELEASE Right 01/2010 January 2010--status post right carpal tunnel release. No surgical treatment on left.   • CORONARY ARTERY BYPASS GRAFT  12/2000 December 2000--coronary artery bypass x 5   • HX OVARIAN CYSTECTOMY  Remote    Remote ovarian cyst excision.   • INCISIONAL BREAST BIOPSY  Remote    Benign   • PACEMAKER REPLACEMENT  10/26/2006    10/26/2006--status post pacemaker replacement. Medtronic EnRhythm [id number]    • PACEMAKER REPLACEMENT  11/30/2015 11/30/2015--pacemaker generator change.         Allergies   Allergen Reactions   • Atorvastatin    • Levothyroxine Sodium    • Other      Seafood   • Penicillins            Current Outpatient Prescriptions:   •  aspirin 81 MG tablet, Take 1 tablet by mouth daily., Disp: , Rfl:   •  Cholecalciferol (VITAMIN D) 1000 UNITS tablet, Take 1 tablet by mouth daily., Disp: , Rfl:   •  Coenzyme Q10 (COQ10) 400 MG capsule, Take 1 capsule by mouth daily. With food, Disp: , Rfl:   •  doxazosin (CARDURA) 4 MG tablet, 1 by mouth daily for high blood pressure, Disp: 90 tablet, Rfl: 3  •  metoprolol succinate XL (TOPROL-XL) 50 MG 24 hr tablet, TAKE ONE TABLET BY MOUTH DAILY, Disp: 90 tablet, Rfl: 1  •  olmesartan-hydrochlorothiazide (BENICAR HCT) 40-25 MG per tablet, TAKE ONE  "TABLET BY MOUTH DAILY FOR BLOOD PRESSURE, Disp: 90 tablet, Rfl: 1  •  Red Yeast Rice 600 MG tablet, Take 2 tablets by mouth every day., Disp: , Rfl:       Family History   Problem Relation Age of Onset   • Hyperlipidemia Mother    • Breast cancer Sister    • Diabetes type II Sister          Social History     Social History   • Marital status:      Spouse name: N/A   • Number of children: N/A   • Years of education: N/A     Occupational History   • Retired      Social History Main Topics   • Smoking status: Never Smoker   • Smokeless tobacco: Never Used   • Alcohol use No   • Drug use: No   • Sexual activity: Not Currently     Partners: Male     Other Topics Concern   • Not on file     Social History Narrative   • No narrative on file         Vitals:    04/19/18 1255   BP: 130/52   Pulse: 94   SpO2: 97%   Weight: 55.3 kg (122 lb)   Height: 152.4 cm (60\")          Physical Exam:    General: Alert and oriented x 3.  No acute distress.  She looks profoundly younger than stated age.  Normal affect.  HEENT: Pupils equal, round, reactive to light; extraocular movements intact; sclerae nonicteric; pharynx, ear canals and TMs normal.  Neck: Without JVD, thyromegaly, bruit, or adenopathy.  Lungs: Clear to auscultation in all fields.  Heart: Regular rate and rhythm without murmur, rub, gallop, or click.  Abdomen: Soft, nontender, without hepatosplenomegaly or hernia.  Bowel sounds normal.  : Deferred.  Rectal: Deferred.  Extremities: Without clubbing, cyanosis, edema, or pulse deficit.  Neurologic: Intact without focal deficit.  Normal station and gait observed during ingress and egress from the examination room.  Skin: Without significant lesion.  Musculoskeletal: Unremarkable.      Lab/other results:      Assessment/Plan:     Diagnosis Plan   1. New onset of headaches after age 50     2. Hyponatremia  Comprehensive Metabolic Panel   3. Benign essential hypertension  Comprehensive Metabolic Panel     Patient has new " onset headaches have resolved after medication adjustment.  Her blood pressure is now well controlled without apparent side effects.  Hyponatremia needs to be reassessed.    Plan is as follows: Check CMP today.  Patient will follow-up on the phone for the results and possible further instructions.  No changes in current medical regimen.  Patient will follow-up as previously scheduled in July.        Procedures

## 2018-04-20 LAB
ALBUMIN SERPL-MCNC: 4.1 G/DL (ref 3.5–5.2)
ALBUMIN/GLOB SERPL: 1.8 G/DL
ALP SERPL-CCNC: 76 U/L (ref 39–117)
ALT SERPL-CCNC: 12 U/L (ref 1–33)
AST SERPL-CCNC: 14 U/L (ref 1–32)
BILIRUB SERPL-MCNC: 0.2 MG/DL (ref 0.1–1.2)
BUN SERPL-MCNC: 26 MG/DL (ref 8–23)
BUN/CREAT SERPL: 28.3 (ref 7–25)
CALCIUM SERPL-MCNC: 9.8 MG/DL (ref 8.2–9.6)
CHLORIDE SERPL-SCNC: 95 MMOL/L (ref 98–107)
CO2 SERPL-SCNC: 30.7 MMOL/L (ref 22–29)
CREAT SERPL-MCNC: 0.92 MG/DL (ref 0.57–1)
GFR SERPLBLD CREATININE-BSD FMLA CKD-EPI: 57 ML/MIN/1.73
GFR SERPLBLD CREATININE-BSD FMLA CKD-EPI: 69 ML/MIN/1.73
GLOBULIN SER CALC-MCNC: 2.3 GM/DL
GLUCOSE SERPL-MCNC: 117 MG/DL (ref 65–99)
POTASSIUM SERPL-SCNC: 4.3 MMOL/L (ref 3.5–5.2)
PROT SERPL-MCNC: 6.4 G/DL (ref 6–8.5)
SODIUM SERPL-SCNC: 138 MMOL/L (ref 136–145)

## 2018-06-04 RX ORDER — METOPROLOL SUCCINATE 50 MG/1
TABLET, EXTENDED RELEASE ORAL
Qty: 90 TABLET | Refills: 0 | Status: SHIPPED | OUTPATIENT
Start: 2018-06-04 | End: 2018-09-06 | Stop reason: SDUPTHER

## 2018-06-04 RX ORDER — DOXAZOSIN MESYLATE 4 MG/1
TABLET ORAL
Qty: 90 TABLET | Refills: 0 | Status: SHIPPED | OUTPATIENT
Start: 2018-06-04 | End: 2018-09-06 | Stop reason: SDUPTHER

## 2018-06-04 RX ORDER — OLMESARTAN MEDOXOMIL AND HYDROCHLOROTHIAZIDE 40/25 40; 25 MG/1; MG/1
TABLET ORAL
Qty: 90 TABLET | Refills: 0 | Status: SHIPPED | OUTPATIENT
Start: 2018-06-04 | End: 2018-09-06 | Stop reason: SDUPTHER

## 2018-06-29 DIAGNOSIS — Z51.81 THERAPEUTIC DRUG MONITORING: ICD-10-CM

## 2018-06-29 DIAGNOSIS — E03.8 SUBCLINICAL HYPOTHYROIDISM: Chronic | ICD-10-CM

## 2018-06-29 DIAGNOSIS — R73.01 IMPAIRED FASTING GLUCOSE: Chronic | ICD-10-CM

## 2018-06-29 DIAGNOSIS — E55.9 VITAMIN D DEFICIENCY: Chronic | ICD-10-CM

## 2018-07-04 LAB
25(OH)D3+25(OH)D2 SERPL-MCNC: 55.5 NG/ML (ref 30–100)
ALBUMIN SERPL-MCNC: 4.4 G/DL (ref 3.5–5.2)
ALBUMIN/GLOB SERPL: 1.8 G/DL
ALP SERPL-CCNC: 75 U/L (ref 39–117)
ALT SERPL-CCNC: 14 U/L (ref 1–33)
AST SERPL-CCNC: 13 U/L (ref 1–32)
BILIRUB SERPL-MCNC: 0.4 MG/DL (ref 0.1–1.2)
BUN SERPL-MCNC: 22 MG/DL (ref 8–23)
BUN/CREAT SERPL: 23.4 (ref 7–25)
CALCIUM SERPL-MCNC: 9.3 MG/DL (ref 8.2–9.6)
CHLORIDE SERPL-SCNC: 96 MMOL/L (ref 98–107)
CHOLEST SERPL-MCNC: 228 MG/DL (ref 100–199)
CO2 SERPL-SCNC: 29.5 MMOL/L (ref 22–29)
CREAT SERPL-MCNC: 0.94 MG/DL (ref 0.57–1)
ERYTHROCYTE [DISTWIDTH] IN BLOOD BY AUTOMATED COUNT: 13.6 % (ref 11.7–13)
GLOBULIN SER CALC-MCNC: 2.4 GM/DL
GLUCOSE SERPL-MCNC: 102 MG/DL (ref 65–99)
HBA1C MFR BLD: 6.11 % (ref 4.8–5.6)
HCT VFR BLD AUTO: 39.9 % (ref 35.6–45.5)
HDL SERPL-SCNC: 31.1 UMOL/L
HDLC SERPL-MCNC: 49 MG/DL
HGB BLD-MCNC: 12.8 G/DL (ref 11.9–15.5)
LDL SERPL QN: 20.7 NM
LDL SERPL-SCNC: 1932 NMOL/L
LDL SMALL SERPL-SCNC: 997 NMOL/L
LDLC SERPL CALC-MCNC: 146 MG/DL (ref 0–99)
MCH RBC QN AUTO: 28.6 PG (ref 26.9–32)
MCHC RBC AUTO-ENTMCNC: 32.1 G/DL (ref 32.4–36.3)
MCV RBC AUTO: 89.1 FL (ref 80.5–98.2)
PLATELET # BLD AUTO: 203 10*3/MM3 (ref 140–500)
POTASSIUM SERPL-SCNC: 4.2 MMOL/L (ref 3.5–5.2)
PROT SERPL-MCNC: 6.8 G/DL (ref 6–8.5)
RBC # BLD AUTO: 4.48 10*6/MM3 (ref 3.9–5.2)
SODIUM SERPL-SCNC: 138 MMOL/L (ref 136–145)
T3FREE SERPL-MCNC: 2.6 PG/ML (ref 2–4.4)
T4 FREE SERPL-MCNC: 1.29 NG/DL (ref 0.93–1.7)
TRIGL SERPL-MCNC: 164 MG/DL (ref 0–149)
TSH SERPL DL<=0.005 MIU/L-ACNC: 2.43 MIU/ML (ref 0.27–4.2)
WBC # BLD AUTO: 5.66 10*3/MM3 (ref 4.5–10.7)

## 2018-07-09 ENCOUNTER — OFFICE VISIT (OUTPATIENT)
Dept: INTERNAL MEDICINE | Facility: CLINIC | Age: 83
End: 2018-07-09

## 2018-07-09 VITALS
WEIGHT: 120 LBS | BODY MASS INDEX: 23.56 KG/M2 | DIASTOLIC BLOOD PRESSURE: 50 MMHG | HEART RATE: 94 BPM | HEIGHT: 60 IN | SYSTOLIC BLOOD PRESSURE: 122 MMHG | OXYGEN SATURATION: 98 %

## 2018-07-09 DIAGNOSIS — E78.5 HYPERLIPIDEMIA, UNSPECIFIED HYPERLIPIDEMIA TYPE: Chronic | ICD-10-CM

## 2018-07-09 DIAGNOSIS — M85.89 OSTEOPENIA OF MULTIPLE SITES: Chronic | ICD-10-CM

## 2018-07-09 DIAGNOSIS — Z51.81 THERAPEUTIC DRUG MONITORING: ICD-10-CM

## 2018-07-09 DIAGNOSIS — R73.01 IMPAIRED FASTING GLUCOSE: Chronic | ICD-10-CM

## 2018-07-09 DIAGNOSIS — E55.9 VITAMIN D DEFICIENCY: Chronic | ICD-10-CM

## 2018-07-09 DIAGNOSIS — I10 BENIGN ESSENTIAL HYPERTENSION: Chronic | ICD-10-CM

## 2018-07-09 DIAGNOSIS — I25.10 OCCLUSIVE CORONARY ARTERY DISEASE: Chronic | ICD-10-CM

## 2018-07-09 DIAGNOSIS — Z95.0 HISTORY OF PERMANENT CARDIAC PACEMAKER PLACEMENT: Chronic | ICD-10-CM

## 2018-07-09 DIAGNOSIS — Z98.890 STATUS POST CATHETER ABLATION OF ATRIAL FLUTTER: Chronic | ICD-10-CM

## 2018-07-09 DIAGNOSIS — I34.0 MODERATE MITRAL REGURGITATION: Chronic | ICD-10-CM

## 2018-07-09 DIAGNOSIS — Z86.79 HISTORY OF COMPLETE AV BLOCK: Chronic | ICD-10-CM

## 2018-07-09 DIAGNOSIS — Z86.79 HISTORY OF ATRIAL FLUTTER: Chronic | ICD-10-CM

## 2018-07-09 DIAGNOSIS — E03.8 SUBCLINICAL HYPOTHYROIDISM: Chronic | ICD-10-CM

## 2018-07-09 DIAGNOSIS — Z78.9 STATIN INTOLERANCE: Chronic | ICD-10-CM

## 2018-07-09 DIAGNOSIS — Z00.00 MEDICARE ANNUAL WELLNESS VISIT, SUBSEQUENT: Primary | ICD-10-CM

## 2018-07-09 PROBLEM — R51.9 NEW ONSET OF HEADACHES AFTER AGE 50: Status: RESOLVED | Noted: 2018-03-21 | Resolved: 2018-07-09

## 2018-07-09 PROBLEM — E87.1 HYPONATREMIA: Status: RESOLVED | Noted: 2018-02-28 | Resolved: 2018-07-09

## 2018-07-09 PROCEDURE — G0439 PPPS, SUBSEQ VISIT: HCPCS | Performed by: INTERNAL MEDICINE

## 2018-07-09 PROCEDURE — 99214 OFFICE O/P EST MOD 30 MIN: CPT | Performed by: INTERNAL MEDICINE

## 2018-07-09 RX ORDER — METRONIDAZOLE 7.5 MG/G
GEL TOPICAL
COMMUNITY
Start: 2018-05-09 | End: 2020-07-05

## 2018-07-09 RX ORDER — ASPIRIN 325 MG
325 TABLET ORAL DAILY
COMMUNITY
End: 2019-07-16

## 2018-07-09 RX ORDER — CLOTRIMAZOLE AND BETAMETHASONE DIPROPIONATE 10; .64 MG/G; MG/G
CREAM TOPICAL
COMMUNITY
Start: 2018-05-09 | End: 2019-07-16

## 2018-07-09 NOTE — PROGRESS NOTES
07/09/2018    Patient Information  Lora Serrano                                                                                          [address] [facility] [address]      7/15/1926  [phone]      Chief Complaint:     Subsequent Medicare wellness visit.  Follow-up impaired fasting glucose, hyperlipidemia, hypertension, subclinical hypothyroidism, coronary artery disease, history of atrial flutter, status post ablation, history of permanent cardiac pacemaker for complete AV block, statin intolerance, moderate mitral regurgitation, osteopenia.  No new acute complaints.    History of Present Illness:    Patient with a history of medical problems as outlined in the chief complaint that have been fairly stable now for at least the past year.  She presents for her subsequent Medicare wellness visit.  She also had lab work in order to monitor her chronic medical issues.  She currently feels fairly well and has no new acute complaints.  Her past medical history reviewed and updated where necessary including health maintenance parameters.  This reveals she is up-to-date or else accounted for.    Review of Systems   Constitution: Negative.   HENT: Negative.    Eyes: Negative.    Cardiovascular: Negative.    Respiratory: Negative.    Endocrine: Negative.    Hematologic/Lymphatic: Negative.    Skin: Negative.    Musculoskeletal: Positive for arthritis.   Gastrointestinal: Negative.    Genitourinary: Negative.    Neurological: Negative.    Psychiatric/Behavioral: Negative.    Allergic/Immunologic: Negative.        Active Problems:    Patient Active Problem List   Diagnosis   • Occlusive coronary artery disease, 12/11/2000--status post 5 vessel CABG   • Subclinical hypothyroidism   • Impaired fasting glucose   • Onychomycosis of fingernails   • Osteopenia, 01/31/2011--lumbar spine -2.2, left hip -1.0.   • Vitamin D deficiency   • Hyperlipidemia   • Benign essential hypertension   • Generalized  osteoarthritis of multiple sites   • Therapeutic drug monitoring   • History of atrial flutter, 2003--successful catheter ablation for persistent atrial flutter.   • Moderate mitral regurgitation, 02/18/2012--moderate MR, EF 58%.   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 2006--PM replacement.  Medtronic EnRhythm [id number]. 11/30/2015--PM generator change.   • Primary osteoarthritis of both knees   • Statin intolerance         Past Medical History:   Diagnosis Date   • Benign essential hypertension 3/14/2016   • Generalized osteoarthritis of multiple sites 3/14/2016   • History of atrial flutter, 2003--successful catheter ablation for persistent atrial flutter. 2003 2003--catheter ablation for persistent atrial flutter was successful.   • History of complete AV block, 10/26/2006--permanent pacemaker replacement.  12/18/2000--dual-chamber pacemaker placement for sick sinus syndrome, atrial flutter, AV block after CABG. 12/18/2000 12/18/2000--dual-chamber pacemaker placement for sick sinus syndrome, atrial flutter, AV block that occurred after her bypass surgery. 10/26/2006--status post pacemaker replacement. Medtronic EnRhythm [id number]   • History of drug rash, 01/11/2014--name brand Synthroid 01/11/2014 01/11/2014--patient presented with a diffuse erythematous rash that was very pruritic. Examination consistent with a drug reaction/allergic dermatitis. Believed to be secondary to name brand Synthroid. This was discontinued and patient treated with a Medrol Dosepak.   • History of osteoporosis, June 2001 osteoporosis of the LS spine.  2011--improved to osteopenia only. 2011,2001 01/31/2011--DEXA scan  revealed LS spine T score -2.2, left hip -1.0.  Now osteopenia.   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 2006--PM replacement.  Medtronic EnRhythm  [id number]. 11/30/2015--PM generator change. 11/30/2015 11/30/2015--pacemaker generator change.  10/26/2006--status post pacemaker replacement. Medtronic EnRhythm [id number]  12/18/2000--dual-chamber pacemaker placement for sick sinus syndrome, atrial flutter, AV block that occurred after her bypass surgery.   2000--dual-chamber PM for SSS, A flutter, AVB after CABG. 2006--PM replacement.  Medtronic EnRhythm [id number]. 11/30/2015--PM generator change.   • Hyperlipidemia 3/14/2016   • Impaired fasting glucose 3/16/2016    03/16/2015--type 2 diabetes is now evolved into impaired fasting glucose.  Hemoglobin A1c 5.7.  Diagnosed January 2010   • Moderate mitral regurgitation, 02/18/2012--moderate MR, EF 58%. 1/18/2012 01/18/2012 revealed ejection fraction of 58%, moderate mitral regurgitation.   • Occlusive coronary artery disease, 12/11/2000--status post 5 vessel CABG 12/11/2000 12/11/2000--status post 5 vessel CABG.  Patient subsequently had chest pain from her sternal wires which were removed.   • Onychomycosis of fingernails 3/14/2016    Patient has had onychomycosis involving her fingernails for several years.  She tries to control it with Ertaczo cream.   • Osteopenia, 01/31/2011--lumbar spine -2.2, left hip -1.0. 6/1/2001 01/31/2011--DEXA scan  revealed LS spine T score -2.2, left hip -1.0.  Now osteopenia.   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   • Primary osteoarthritis of both knees 9/28/2016 09/28/2016--patient seen in follow-up and continues to have bilateral knee discomfort particularly with prolonged use.  Right knee is worse than the left.  She understandably does not want have a knee replacement but I think she would benefit from orthopedic evaluation for consideration of cortisone or visco supplementation.  Patient referred to Dr. Wallace.  08/26/2016--x-ray of the right knee reveals very severe degenerative changes involving the medial compartment with marked joint space narrowing  and subchondral sclerosis and marginal spurring.  No joint effusion and no acute pathology.   • Statin intolerance 3/28/2017    Intolerance to multiple statins.   • Status post catheter ablation of atrial flutter, 2002--successful catheter ablation for persistent atrial flutter. 2003 2003--catheter ablation for persistent atrial flutter was successful.   • Subclinical hypothyroidism 5/9/2013    Diagnosed 05/16/2013.   05/09/2013--ultrasound thyroid for new hypothyroidism revealed a small thyroid gland consistent with hypothyroidism.  Mixed nodule present.   • Vitamin D deficiency 3/14/2016         Past Surgical History:   Procedure Laterality Date   • CARDIAC ABLATION  07/02/2003 07/02/2003--catheter ablation for persistent atrial flutter was successful.   • CARDIAC PACEMAKER PLACEMENT  12/18/2000 12/18/2000--dual-chamber pacemaker placement for sick sinus syndrome, atrial flutter, AV block that occurred after her bypass surgery.   • CARPAL TUNNEL RELEASE Right 01/2010 January 2010--status post right carpal tunnel release. No surgical treatment on left.   • CORONARY ARTERY BYPASS GRAFT  12/2000 December 2000--coronary artery bypass x 5   • HX OVARIAN CYSTECTOMY  Remote    Remote ovarian cyst excision.   • INCISIONAL BREAST BIOPSY  Remote    Benign   • PACEMAKER REPLACEMENT  10/26/2006    10/26/2006--status post pacemaker replacement. Medtronic EnRhythm [id number]    • PACEMAKER REPLACEMENT  11/30/2015 11/30/2015--pacemaker generator change.         Allergies   Allergen Reactions   • Atorvastatin    • Levothyroxine Sodium    • Other      Seafood   • Penicillins            Current Outpatient Prescriptions:   •  aspirin 325 MG tablet, Take 325 mg by mouth Daily., Disp: , Rfl:   •  Cholecalciferol (VITAMIN D) 1000 UNITS tablet, Take 1 tablet by mouth daily., Disp: , Rfl:   •  clotrimazole-betamethasone (LOTRISONE) 1-0.05 % cream, , Disp: , Rfl:   •  Coenzyme Q10 (COQ10) 400 MG capsule, Take 1 capsule by  "mouth daily. With food, Disp: , Rfl:   •  doxazosin (CARDURA) 4 MG tablet, TAKE ONE TABLET BY MOUTH EVERY MORNING, Disp: 90 tablet, Rfl: 0  •  metoprolol succinate XL (TOPROL-XL) 50 MG 24 hr tablet, TAKE ONE TABLET BY MOUTH DAILY, Disp: 90 tablet, Rfl: 0  •  metroNIDAZOLE (METROGEL) 0.75 % gel, , Disp: , Rfl:   •  olmesartan-hydrochlorothiazide (BENICAR HCT) 40-25 MG per tablet, TAKE ONE TABLET BY MOUTH DAILY FOR BLOOD PRESSURE, Disp: 90 tablet, Rfl: 0  •  Red Yeast Rice 600 MG tablet, Take 2 tablets by mouth every day., Disp: , Rfl:   •  aspirin 81 MG tablet, Take 1 tablet by mouth daily., Disp: , Rfl:       Family History   Problem Relation Age of Onset   • Hyperlipidemia Mother    • Breast cancer Sister    • Diabetes type II Sister          Social History     Social History   • Marital status:      Spouse name: N/A   • Number of children: N/A   • Years of education: N/A     Occupational History   • Retired      Social History Main Topics   • Smoking status: Never Smoker   • Smokeless tobacco: Never Used   • Alcohol use No   • Drug use: No   • Sexual activity: Not Currently     Partners: Male     Other Topics Concern   • Not on file     Social History Narrative   • No narrative on file         Vitals:    07/09/18 0901   BP: 122/50   BP Location: Right arm   Patient Position: Sitting   Cuff Size: Adult   Pulse: 94   SpO2: 98%   Weight: 54.4 kg (120 lb)   Height: 152.4 cm (60\")          Physical Exam:    General: Alert and oriented x 3.  No acute distress.  Normal affect.  HEENT: Pupils equal, round, reactive to light; extraocular movements intact; sclerae nonicteric; pharynx, ear canals and TMs normal.  Neck: Without JVD, thyromegaly, bruit, or adenopathy.  Lungs: Clear to auscultation in all fields.  Heart: Regular rate and rhythm without murmur, rub, gallop, or click.  Abdomen: Soft, nontender, without hepatosplenomegaly or hernia.  Bowel sounds normal.  : Deferred.  Rectal: Deferred.  Extremities: " Without clubbing, cyanosis, edema, or pulse deficit.  Neurologic: Intact without focal deficit.  Normal station and gait observed during ingress and egress from the examination room.  Skin: Without significant lesion.  Musculoskeletal: Unremarkable.      Lab/other results:    NMR reveals total cholesterol 228.  Triglycerides mildly elevated 164.  LDL particle number elevated at 1932.  Small LDL particle number elevated at 997.  HDL particle number normal at 31.1.  CMP normal except blood sugar 102.  Chloride is a little low at 96 but her sodium is normal in the 138.  CBC essentially normal.  Hemoglobin A1c 6.11.  Thyroid function tests normal.  Vitamin D normal.    Assessment/Plan:     Diagnosis Plan   1. Medicare annual wellness visit, subsequent     2. Impaired fasting glucose     3. Hyperlipidemia, unspecified hyperlipidemia type     4. Benign essential hypertension     5. Subclinical hypothyroidism     6. Occlusive coronary artery disease, 12/11/2000--status post 5 vessel CABG     7. History of atrial flutter, 2003--successful catheter ablation for persistent atrial flutter.     8. History of complete AV block, 2000--dual-chamber PM for SSS, A flutter, AVB after CABG. 2006--PM replacement.  Medtronic EnRhythm [id number]. 11/30/2015--PM generator change.     9. Status post catheter ablation of atrial flutter, 2002--successful catheter ablation for persistent atrial flutter.     10. History of permanent cardiac PM, 2000--dual-chamber PM for SSS, A flutter, AVB after CABG. 2006--PM replacement.  Medtronic EnRhythm [id number]. 11/30/2015--PM generator change.     11. Statin intolerance     12. Moderate mitral regurgitation, 02/18/2012--moderate MR, EF 58%.     13. Osteopenia of multiple sites     14. Therapeutic drug monitoring       The subsequent Medicare wellness visit is documented on a separate note.    Patient has impaired fasting glucose at does not require medication.  She has hyperlipidemia and known coronary  artery disease but unfortunately cannot tolerate statins.  She is doing the best she can with red yeast Rice.  Her coronary artery disease is stable.  Her blood pressure seems to be controlled.  She has a history of atrial flutter and had a catheter ablation and is followed by the cardiologist.  She is also followed for her permanent pacemaker and her moderate mitral regurgitation.  She has osteopenia and has made the decision not to pursue any further bone density test.    Plan is as follows: No change in current medical regimen.  Patient will follow-up in 6 months with lab prior or follow-up as needed.    Procedures

## 2018-07-09 NOTE — PROGRESS NOTES
QUICK REFERENCE INFORMATION:  The ABCs of the Annual Wellness Visit    Subsequent Medicare Wellness Visit    HEALTH RISK ASSESSMENT    7/15/1926    Recent Hospitalizations:  No hospitalization(s) within the last year..        Current Medical Providers:  Patient Care Team:  Adilson Mix MD as PCP - General (Internal Medicine)  Adilson Mix MD as PCP - Claims Attributed  Allison Akins RN as Care Coordinator (Population Health)        Smoking Status:  History   Smoking Status   • Never Smoker   Smokeless Tobacco   • Never Used       Alcohol Consumption:  History   Alcohol Use No       Depression Screen:   PHQ-2/PHQ-9 Depression Screening 7/9/2018   Little interest or pleasure in doing things 0   Feeling down, depressed, or hopeless 0   Trouble falling or staying asleep, or sleeping too much -   Feeling tired or having little energy -   Poor appetite or overeating -   Feeling bad about yourself - or that you are a failure or have let yourself or your family down -   Trouble concentrating on things, such as reading the newspaper or watching television -   Moving or speaking so slowly that other people could have noticed. Or the opposite - being so fidgety or restless that you have been moving around a lot more than usual -   Thoughts that you would be better off dead, or of hurting yourself in some way -   Total Score 0   If you checked off any problems, how difficult have these problems made it for you to do your work, take care of things at home, or get along with other people? -       Health Habits and Functional and Cognitive Screening:  Functional & Cognitive Status 7/9/2018   Do you have difficulty preparing food and eating? No   Do you have difficulty bathing yourself, getting dressed or grooming yourself? No   Do you have difficulty using the toilet? No   Do you have difficulty moving around from place to place? No   Do you have trouble with steps or getting out of a bed or a chair? No   In the past  year have you fallen or experienced a near fall? No   Current Diet Well Balanced Diet   Dental Exam Up to date   Eye Exam Up to date   Exercise (times per week) 0 times per week   Current Exercise Activities Include None   Do you need help using the phone?  No   Are you deaf or do you have serious difficulty hearing?  No   Do you need help with transportation? Yes   Do you need help shopping? No   Do you need help preparing meals?  No   Do you need help with housework?  No   Do you need help with laundry? No   Do you need help taking your medications? No   Do you need help managing money? No   Do you ever drive or ride in a car without wearing a seat belt? No   Have you felt unusual stress, anger or loneliness in the last month? Yes   Who do you live with? Child   If you need help, do you have trouble finding someone available to you? No   Have you been bothered in the last four weeks by sexual problems? No   Do you have difficulty concentrating, remembering or making decisions? No           Does the patient have evidence of cognitive impairment? No    Aspirin use counseling: Taking ASA appropriately as indicated      Recent Lab Results:  CMP:  Lab Results   Component Value Date     (H) 07/02/2018    BUN 22 07/02/2018    CREATININE 0.94 07/02/2018    EGFRIFNONA 56 (L) 07/02/2018    EGFRIFAFRI 68 07/02/2018    BCR 23.4 07/02/2018     07/02/2018    K 4.2 07/02/2018    CO2 29.5 (H) 07/02/2018    CALCIUM 9.3 07/02/2018    PROTENTOTREF 6.8 07/02/2018    ALBUMIN 4.40 07/02/2018    LABGLOBREF 2.4 07/02/2018    LABIL2 1.8 07/02/2018    BILITOT 0.4 07/02/2018    ALKPHOS 75 07/02/2018    AST 13 07/02/2018    ALT 14 07/02/2018     Lipid Panel:  Lab Results   Component Value Date    TRIG 164 (H) 07/02/2018     HbA1c:  Lab Results   Component Value Date    HGBA1C 6.11 (H) 07/02/2018       Visual Acuity:  No exam data present    Age-appropriate Screening Schedule:  Refer to the list below for future screening  recommendations based on patient's age, sex and/or medical conditions. Orders for these recommended tests are listed in the plan section. The patient has been provided with a written plan.    Health Maintenance   Topic Date Due   • INFLUENZA VACCINE  08/01/2018   • MAMMOGRAM  03/28/2019   • DXA SCAN  03/28/2019   • LIPID PANEL  07/02/2019   • TDAP/TD VACCINES (2 - Td) 03/28/2027   • PNEUMOCOCCAL VACCINES (65+ LOW/MEDIUM RISK)  Completed   • ZOSTER VACCINE  Excluded        Subjective   History of Present Illness    Lora Serrano is a 89 y.o. female who presents for an Subsequent Wellness Visit.    The following portions of the patient's history were reviewed and updated as appropriate: allergies, current medications, past family history, past medical history, past social history, past surgical history and problem list.    Outpatient Medications Prior to Visit   Medication Sig Dispense Refill   • Cholecalciferol (VITAMIN D) 1000 UNITS tablet Take 1 tablet by mouth daily.     • Coenzyme Q10 (COQ10) 400 MG capsule Take 1 capsule by mouth daily. With food     • doxazosin (CARDURA) 4 MG tablet TAKE ONE TABLET BY MOUTH EVERY MORNING 90 tablet 0   • metoprolol succinate XL (TOPROL-XL) 50 MG 24 hr tablet TAKE ONE TABLET BY MOUTH DAILY 90 tablet 0   • olmesartan-hydrochlorothiazide (BENICAR HCT) 40-25 MG per tablet TAKE ONE TABLET BY MOUTH DAILY FOR BLOOD PRESSURE 90 tablet 0   • Red Yeast Rice 600 MG tablet Take 2 tablets by mouth every day.     • aspirin 81 MG tablet Take 1 tablet by mouth daily.       No facility-administered medications prior to visit.        Patient Active Problem List   Diagnosis   • Occlusive coronary artery disease, 12/11/2000--status post 5 vessel CABG   • Subclinical hypothyroidism   • Impaired fasting glucose   • Onychomycosis of fingernails   • Osteopenia, 01/31/2011--lumbar spine -2.2, left hip -1.0.   • Vitamin D deficiency   • Hyperlipidemia   • Benign essential hypertension   • Generalized  osteoarthritis of multiple sites   • Therapeutic drug monitoring   • History of atrial flutter, 2003--successful catheter ablation for persistent atrial flutter.   • Moderate mitral regurgitation, 02/18/2012--moderate MR, EF 58%.   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 2006--PM replacement.  Medtronic EnRhythm [id number]. 11/30/2015--PM generator change.   • Primary osteoarthritis of both knees   • Statin intolerance       Advance Care Planning:  has NO advance directive - information provided to the patient today    Identification of Risk Factors:  Risk factors include: cardiovascular risk and increased fall risk.    Review of Systems   Musculoskeletal: Positive for arthralgias.   All other systems reviewed and are negative.      Compared to one year ago, the patient feels her physical health is the same.  Compared to one year ago, the patient feels her mental health is the same.    Objective       Physical Exam    General: Alert and oriented x 3.  No acute distress.  Normal affect.  HEENT: Pupils equal, round, reactive to light; extraocular movements intact; sclerae nonicteric; pharynx, ear canals and TMs normal.  Neck: Without JVD, thyromegaly, bruit, or adenopathy.  Lungs: Clear to auscultation in all fields.  Heart: Regular rate and rhythm without murmur, rub, gallop, or click.  Abdomen: Soft, nontender, without hepatosplenomegaly or hernia.  Bowel sounds normal.  : Deferred.  Rectal: Deferred.  Extremities: Without clubbing, cyanosis, edema, or pulse deficit.  Neurologic: Intact without focal deficit.  Normal station and gait observed during ingress and egress from the examination room.  Skin: Without significant lesion.  Musculoskeletal: Unremarkable except changes consistent with osteoarthritis.    Vitals:    07/09/18 0901   BP: 122/50   BP Location: Right arm   Patient Position: Sitting   Cuff Size: Adult   Pulse: 94   SpO2: 98%   Weight: 54.4 kg (120  "lb)   Height: 152.4 cm (60\")   PainSc:   3   PainLoc: Knee  Comment: BOTH       Patient's Body mass index is 23.44 kg/m². BMI is within normal parameters. No follow-up required.      Assessment/Plan   Patient Self-Management and Personalized Health Advice  The patient has been provided with information about: exercise, prevention of cardiac or vascular disease, fall prevention and designing advance directives and preventive services including:   · Advance directive, Diabetes screening, see lab orders, Exercise counseling provided, Fall Risk assessment done, Glaucoma screening recommended, Zostavax vaccine (Herpes Zoster).    Visit Diagnoses:    ICD-10-CM ICD-9-CM   1. Medicare annual wellness visit, subsequent Z00.00 V70.0   2. Impaired fasting glucose R73.01 790.21   3. Hyperlipidemia, unspecified hyperlipidemia type E78.5 272.4   4. Benign essential hypertension I10 401.1   5. Subclinical hypothyroidism E03.9 244.8   6. Occlusive coronary artery disease, 12/11/2000--status post 5 vessel CABG I25.10 414.00   7. History of atrial flutter, 2003--successful catheter ablation for persistent atrial flutter. Z86.79 V12.59   8. History of complete AV block, 2000--dual-chamber PM for SSS, A flutter, AVB after CABG. 2006--PM replacement.  Medtronic EnRhythm [id number]. 11/30/2015--PM generator change. Z86.79 V12.59   9. Status post catheter ablation of atrial flutter, 2002--successful catheter ablation for persistent atrial flutter. Z98.890 V45.89   10. History of permanent cardiac PM, 2000--dual-chamber PM for SSS, A flutter, AVB after CABG. 2006--PM replacement.  Medtronic EnRhythm [id number]. 11/30/2015--PM generator change. Z95.0 V45.01   11. Statin intolerance Z78.9 995.27   12. Moderate mitral regurgitation, 02/18/2012--moderate MR, EF 58%. I34.0 424.0   13. Osteopenia of multiple sites M85.89 733.90   14. Therapeutic drug monitoring Z51.81 V58.83   15. Vitamin D deficiency E55.9 268.9       Orders Placed This Encounter "   Procedures   • Comprehensive Metabolic Panel     Standing Status:   Future     Standing Expiration Date:   7/9/2020   • Hemoglobin A1c     Standing Status:   Future     Standing Expiration Date:   7/9/2020   • NMR LipoProfile     Standing Status:   Future     Standing Expiration Date:   7/9/2020   • Vitamin D 25 Hydroxy     Standing Status:   Future     Standing Expiration Date:   7/9/2020   • TSH     Standing Status:   Future     Standing Expiration Date:   7/9/2020   • T4, Free     Standing Status:   Future     Standing Expiration Date:   7/9/2020   • T3, Free     Standing Status:   Future     Standing Expiration Date:   7/9/2020       Outpatient Encounter Prescriptions as of 7/9/2018   Medication Sig Dispense Refill   • aspirin 325 MG tablet Take 325 mg by mouth Daily.     • Cholecalciferol (VITAMIN D) 1000 UNITS tablet Take 1 tablet by mouth daily.     • clotrimazole-betamethasone (LOTRISONE) 1-0.05 % cream      • Coenzyme Q10 (COQ10) 400 MG capsule Take 1 capsule by mouth daily. With food     • doxazosin (CARDURA) 4 MG tablet TAKE ONE TABLET BY MOUTH EVERY MORNING 90 tablet 0   • metoprolol succinate XL (TOPROL-XL) 50 MG 24 hr tablet TAKE ONE TABLET BY MOUTH DAILY 90 tablet 0   • metroNIDAZOLE (METROGEL) 0.75 % gel      • olmesartan-hydrochlorothiazide (BENICAR HCT) 40-25 MG per tablet TAKE ONE TABLET BY MOUTH DAILY FOR BLOOD PRESSURE 90 tablet 0   • Red Yeast Rice 600 MG tablet Take 2 tablets by mouth every day.     • aspirin 81 MG tablet Take 1 tablet by mouth daily.       No facility-administered encounter medications on file as of 7/9/2018.        Reviewed use of high risk medication in the elderly: yes  Reviewed for potential of harmful drug interactions in the elderly: yes    Follow Up:  Return in about 6 months (around 1/9/2019) for Next scheduled follow up with lab prior.     An After Visit Summary and PPPS with all of these plans were given to the patient.

## 2018-07-18 ENCOUNTER — CLINICAL SUPPORT NO REQUIREMENTS (OUTPATIENT)
Dept: CARDIOLOGY | Facility: CLINIC | Age: 83
End: 2018-07-18

## 2018-07-18 DIAGNOSIS — I44.2 COMPLETE HEART BLOCK (HCC): Primary | ICD-10-CM

## 2018-07-18 PROCEDURE — 93280 PM DEVICE PROGR EVAL DUAL: CPT | Performed by: INTERNAL MEDICINE

## 2018-09-06 RX ORDER — METOPROLOL SUCCINATE 50 MG/1
50 TABLET, EXTENDED RELEASE ORAL DAILY
Qty: 90 TABLET | Refills: 1 | Status: SHIPPED | OUTPATIENT
Start: 2018-09-06 | End: 2019-02-27 | Stop reason: SDUPTHER

## 2018-09-06 RX ORDER — OLMESARTAN MEDOXOMIL AND HYDROCHLOROTHIAZIDE 40/25 40; 25 MG/1; MG/1
TABLET ORAL
Qty: 90 TABLET | Refills: 0 | Status: SHIPPED | OUTPATIENT
Start: 2018-09-06 | End: 2019-01-15 | Stop reason: SDUPTHER

## 2018-09-06 RX ORDER — DOXAZOSIN MESYLATE 4 MG/1
4 TABLET ORAL EVERY MORNING
Qty: 90 TABLET | Refills: 1 | Status: SHIPPED | OUTPATIENT
Start: 2018-09-06 | End: 2019-02-27 | Stop reason: SDUPTHER

## 2018-09-06 RX ORDER — OLMESARTAN MEDOXOMIL AND HYDROCHLOROTHIAZIDE 40/25 40; 25 MG/1; MG/1
1 TABLET ORAL DAILY
Qty: 90 TABLET | Refills: 1 | Status: SHIPPED | OUTPATIENT
Start: 2018-09-06 | End: 2019-05-30 | Stop reason: SDUPTHER

## 2018-09-28 ENCOUNTER — CLINICAL SUPPORT (OUTPATIENT)
Dept: INTERNAL MEDICINE | Facility: CLINIC | Age: 83
End: 2018-09-28

## 2018-09-28 DIAGNOSIS — Z23 NEED FOR INFLUENZA VACCINATION: ICD-10-CM

## 2018-09-28 PROCEDURE — 90662 IIV NO PRSV INCREASED AG IM: CPT | Performed by: INTERNAL MEDICINE

## 2018-09-28 PROCEDURE — G0008 ADMIN INFLUENZA VIRUS VAC: HCPCS | Performed by: INTERNAL MEDICINE

## 2018-12-05 ENCOUNTER — TELEPHONE (OUTPATIENT)
Dept: INTERNAL MEDICINE | Facility: CLINIC | Age: 83
End: 2018-12-05

## 2018-12-05 NOTE — TELEPHONE ENCOUNTER
Tell patient she may get lightheaded.  She needs to be careful about walking around without assistance.  Tell her to skip taking any blood pressure pills tomorrow and then restart back on her current medication.  Call me for any concerns.

## 2018-12-05 NOTE — TELEPHONE ENCOUNTER
Pt accidentally took all 3 BP meds twice this morning. First at 9:15am and 2nd at 10am. What should she do? Call Maria Teresa Wood [phone] or pt at [phone].

## 2018-12-14 ENCOUNTER — CLINICAL SUPPORT NO REQUIREMENTS (OUTPATIENT)
Dept: CARDIOLOGY | Facility: CLINIC | Age: 83
End: 2018-12-14

## 2018-12-14 ENCOUNTER — OFFICE VISIT (OUTPATIENT)
Dept: CARDIOLOGY | Facility: CLINIC | Age: 83
End: 2018-12-14

## 2018-12-14 VITALS
HEIGHT: 60 IN | HEART RATE: 80 BPM | DIASTOLIC BLOOD PRESSURE: 72 MMHG | SYSTOLIC BLOOD PRESSURE: 132 MMHG | BODY MASS INDEX: 23.36 KG/M2 | WEIGHT: 119 LBS

## 2018-12-14 DIAGNOSIS — I44.2 THIRD DEGREE AV BLOCK (HCC): Primary | ICD-10-CM

## 2018-12-14 DIAGNOSIS — I10 BENIGN ESSENTIAL HYPERTENSION: Chronic | ICD-10-CM

## 2018-12-14 DIAGNOSIS — Z95.0 HISTORY OF PERMANENT CARDIAC PACEMAKER PLACEMENT: Primary | Chronic | ICD-10-CM

## 2018-12-14 PROCEDURE — 93000 ELECTROCARDIOGRAM COMPLETE: CPT | Performed by: INTERNAL MEDICINE

## 2018-12-14 PROCEDURE — 93280 PM DEVICE PROGR EVAL DUAL: CPT | Performed by: INTERNAL MEDICINE

## 2018-12-14 PROCEDURE — 99213 OFFICE O/P EST LOW 20 MIN: CPT | Performed by: INTERNAL MEDICINE

## 2018-12-14 NOTE — PROGRESS NOTES
Date of Office Visit: 2018  Encounter Provider: Antonio Fontanez MD  Place of Service: [facility] CARDIOLOGY  Patient Name: Lora Serrano  : 7/15/1926    Subjective:     Encounter Date:2018      Patient ID: Lora Serrano is a 89 y.o. female who has a cc of av block, CABG< HTN, and dual chamber pacer and moderate MR and here for FU.    She complained first about her knees.     The patient had a good year.   No anginal chest pain,   Mild to moderate moreno on her stairs    No soa,   No fainting,  No orthostasis.   No edema.   Exercise tolerance:     There have been no hospital admission since the last visit.     There have been no bleeding events.       Past Medical History:   Diagnosis Date   • Benign essential hypertension 3/14/2016   • Generalized osteoarthritis of multiple sites 3/14/2016   • History of atrial flutter, --successful catheter ablation for persistent atrial flutter. 2003--catheter ablation for persistent atrial flutter was successful.   • History of complete AV block, 10/26/2006--permanent pacemaker replacement.  2000--dual-chamber pacemaker placement for sick sinus syndrome, atrial flutter, AV block after CABG. 2000--dual-chamber pacemaker placement for sick sinus syndrome, atrial flutter, AV block that occurred after her bypass surgery. 10/26/2006--status post pacemaker replacement. Medtronic EnRhythm [id number]   • History of drug rash, 2014--name brand Synthroid 2014--patient presented with a diffuse erythematous rash that was very pruritic. Examination consistent with a drug reaction/allergic dermatitis. Believed to be secondary to name brand Synthroid. This was discontinued and patient treated with a Medrol Dosepak.   • History of osteoporosis, 2001 osteoporosis of the LS spine.  --improved to osteopenia only. 2011--DEXA scan  revealed LS spine T score -2.2, left hip  -1.0.  Now osteopenia.   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 2006--PM replacement.  Medtronic EnRhythm [id number]. 11/30/2015--PM generator change. 11/30/2015 11/30/2015--pacemaker generator change.  10/26/2006--status post pacemaker replacement. Medtronic EnRhythm [id number]  12/18/2000--dual-chamber pacemaker placement for sick sinus syndrome, atrial flutter, AV block that occurred after her bypass surgery.   2000--dual-chamber PM for SSS, A flutter, AVB after CABG. 2006--PM replacement.  Medtronic EnRhythm [id number]. 11/30/2015--PM generator change.   • Hyperlipidemia 3/14/2016   • Impaired fasting glucose 3/16/2016    03/16/2015--type 2 diabetes is now evolved into impaired fasting glucose.  Hemoglobin A1c 5.7.  Diagnosed January 2010   • Moderate mitral regurgitation, 02/18/2012--moderate MR, EF 58%. 1/18/2012 01/18/2012 revealed ejection fraction of 58%, moderate mitral regurgitation.   • Occlusive coronary artery disease, 12/11/2000--status post 5 vessel CABG 12/11/2000 12/11/2000--status post 5 vessel CABG.  Patient subsequently had chest pain from her sternal wires which were removed.   • Onychomycosis of fingernails 3/14/2016    Patient has had onychomycosis involving her fingernails for several years.  She tries to control it with Ertaczo cream.   • Osteopenia, 01/31/2011--lumbar spine -2.2, left hip -1.0. 6/1/2001 01/31/2011--DEXA scan  revealed LS spine T score -2.2, left hip -1.0.  Now osteopenia.   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   • Primary osteoarthritis of both knees 9/28/2016 09/28/2016--patient seen in follow-up and continues to have bilateral knee discomfort particularly with prolonged use.  Right knee is worse than the left.  She understandably does not want have a knee replacement but I think she would benefit from orthopedic evaluation for consideration  of cortisone or visco supplementation.  Patient referred to Dr. Wallace.  08/26/2016--x-ray of the right knee reveals very severe degenerative changes involving the medial compartment with marked joint space narrowing and subchondral sclerosis and marginal spurring.  No joint effusion and no acute pathology.   • Statin intolerance 3/28/2017    Intolerance to multiple statins.   • Status post catheter ablation of atrial flutter, 2002--successful catheter ablation for persistent atrial flutter. 2003 2003--catheter ablation for persistent atrial flutter was successful.   • Subclinical hypothyroidism 5/9/2013    Diagnosed 05/16/2013.   05/09/2013--ultrasound thyroid for new hypothyroidism revealed a small thyroid gland consistent with hypothyroidism.  Mixed nodule present.   • Vitamin D deficiency 3/14/2016       Social History     Socioeconomic History   • Marital status:      Spouse name: Not on file   • Number of children: Not on file   • Years of education: Not on file   • Highest education level: Not on file   Social Needs   • Financial resource strain: Not on file   • Food insecurity - worry: Not on file   • Food insecurity - inability: Not on file   • Transportation needs - medical: Not on file   • Transportation needs - non-medical: Not on file   Occupational History   • Occupation: Retired   Tobacco Use   • Smoking status: Never Smoker   • Smokeless tobacco: Never Used   Substance and Sexual Activity   • Alcohol use: No   • Drug use: No   • Sexual activity: Not Currently     Partners: Male   Other Topics Concern   • Not on file   Social History Narrative   • Not on file       Review of Systems   Constitution: Negative for fever and night sweats.   HENT: Negative for ear pain and stridor.    Eyes: Negative for discharge and visual halos.   Cardiovascular: Negative for cyanosis.   Respiratory: Negative for hemoptysis and sputum production.    Hematologic/Lymphatic: Negative for adenopathy.   Skin:  "Negative for nail changes and unusual hair distribution.   Musculoskeletal: Positive for back pain and joint pain. Negative for gout and joint swelling.   Gastrointestinal: Negative for bowel incontinence and flatus.   Genitourinary: Negative for dysuria and flank pain.   Neurological: Negative for seizures and tremors.   Psychiatric/Behavioral: Negative for altered mental status. The patient is not nervous/anxious.             Objective:     Vitals:    12/14/18 1122   BP: 132/72   BP Location: Right arm   Patient Position: Sitting   Cuff Size: Adult   Pulse: 80   Weight: 54 kg (119 lb)   Height: 152.4 cm (60\")         Physical Exam   Constitutional: She is oriented to person, place, and time.   HENT:   Head: Normocephalic and atraumatic.   Eyes: Right eye exhibits no discharge. Left eye exhibits no discharge.   Neck: No JVD present. No thyromegaly present.   Cardiovascular: Normal rate and regular rhythm. Exam reveals no gallop and no friction rub.   Murmur heard.   Systolic murmur is present with a grade of 2/6.  Pulmonary/Chest: Effort normal and breath sounds normal. She has no rales.   Abdominal: Soft. Bowel sounds are normal. There is no tenderness.   Musculoskeletal: Normal range of motion. She exhibits no edema or deformity.   Neurological: She is alert and oriented to person, place, and time. She exhibits normal muscle tone.   Skin: Skin is warm and dry. No erythema.   Psychiatric: She has a normal mood and affect. Her behavior is normal. Thought content normal.         ECG 12 Lead  Date/Time: 12/14/2018 12:06 PM  Performed by: Antonio Fontanez MD  Authorized by: Antonio Fontanez MD   Interpreted by ED physician  Comparison: compared with previous ECG   Similar to previous ECG  Rhythm: paced  Clinical impression: abnormal ECG            Lab Review:       Assessment:          Diagnosis Plan   1. History of permanent cardiac PM, 2000--dual-chamber PM for SSS, A flutter, AVB after CABG. 2006--PM replacement.  " Medtronic EnRhythm [id number]. 11/30/2015--PM generator change.     2. Benign essential hypertension            Plan:         History and exam suggest no decline. Her functional capacity at age 89 is great.   There is an MR murmur but no chf -- no JVD or rales or edema.   PACer is ok.   No angina.   On the right meds .       No AF is noted on pacer

## 2018-12-31 ENCOUNTER — TELEPHONE (OUTPATIENT)
Dept: URGENT CARE | Facility: CLINIC | Age: 83
End: 2018-12-31

## 2018-12-31 ENCOUNTER — APPOINTMENT (OUTPATIENT)
Dept: GENERAL RADIOLOGY | Facility: HOSPITAL | Age: 83
End: 2018-12-31

## 2018-12-31 DIAGNOSIS — R91.1 LEFT LOWER LOBE PULMONARY NODULE: Primary | ICD-10-CM

## 2018-12-31 PROCEDURE — 71046 X-RAY EXAM CHEST 2 VIEWS: CPT | Performed by: GENERAL PRACTICE

## 2019-01-02 ENCOUNTER — TELEPHONE (OUTPATIENT)
Dept: INTERNAL MEDICINE | Facility: CLINIC | Age: 84
End: 2019-01-02

## 2019-01-02 NOTE — TELEPHONE ENCOUNTER
Pt can not get rid of this horrible cough. She was seen at  and they gave her Mucine DM. Can you call in a syrup for her. Call Maria Teresa at [phone].

## 2019-01-04 ENCOUNTER — TELEPHONE (OUTPATIENT)
Dept: INTERNAL MEDICINE | Facility: CLINIC | Age: 84
End: 2019-01-04

## 2019-01-07 DIAGNOSIS — E78.5 HYPERLIPIDEMIA, UNSPECIFIED HYPERLIPIDEMIA TYPE: Chronic | ICD-10-CM

## 2019-01-07 DIAGNOSIS — E55.9 VITAMIN D DEFICIENCY: Chronic | ICD-10-CM

## 2019-01-07 DIAGNOSIS — Z51.81 THERAPEUTIC DRUG MONITORING: ICD-10-CM

## 2019-01-07 DIAGNOSIS — I10 BENIGN ESSENTIAL HYPERTENSION: Chronic | ICD-10-CM

## 2019-01-07 DIAGNOSIS — R73.01 IMPAIRED FASTING GLUCOSE: Chronic | ICD-10-CM

## 2019-01-07 DIAGNOSIS — E03.8 SUBCLINICAL HYPOTHYROIDISM: Chronic | ICD-10-CM

## 2019-01-10 LAB
25(OH)D3+25(OH)D2 SERPL-MCNC: 53.1 NG/ML (ref 30–100)
ALBUMIN SERPL-MCNC: 3.9 G/DL (ref 3.5–5.2)
ALBUMIN/GLOB SERPL: 1.4 G/DL
ALP SERPL-CCNC: 75 U/L (ref 39–117)
ALT SERPL-CCNC: 8 U/L (ref 1–33)
AST SERPL-CCNC: 14 U/L (ref 1–32)
BILIRUB SERPL-MCNC: 0.3 MG/DL (ref 0.1–1.2)
BUN SERPL-MCNC: 20 MG/DL (ref 8–23)
BUN/CREAT SERPL: 24.7 (ref 7–25)
CALCIUM SERPL-MCNC: 9.3 MG/DL (ref 8.2–9.6)
CHLORIDE SERPL-SCNC: 93 MMOL/L (ref 98–107)
CHOLEST SERPL-MCNC: 235 MG/DL (ref 100–199)
CO2 SERPL-SCNC: 27.6 MMOL/L (ref 22–29)
CREAT SERPL-MCNC: 0.81 MG/DL (ref 0.57–1)
GLOBULIN SER CALC-MCNC: 2.8 GM/DL
GLUCOSE SERPL-MCNC: 96 MG/DL (ref 65–99)
HBA1C MFR BLD: 6.06 % (ref 4.8–5.6)
HDL SERPL-SCNC: 31.4 UMOL/L
HDLC SERPL-MCNC: 53 MG/DL
LDL SERPL QN: 21.2 NM
LDL SERPL-SCNC: 1846 NMOL/L
LDL SMALL SERPL-SCNC: 515 NMOL/L
LDLC SERPL CALC-MCNC: 156 MG/DL (ref 0–99)
POTASSIUM SERPL-SCNC: 3.9 MMOL/L (ref 3.5–5.2)
PROT SERPL-MCNC: 6.7 G/DL (ref 6–8.5)
SODIUM SERPL-SCNC: 133 MMOL/L (ref 136–145)
T3FREE SERPL-MCNC: 2.4 PG/ML (ref 2–4.4)
T4 FREE SERPL-MCNC: 1.33 NG/DL (ref 0.93–1.7)
TRIGL SERPL-MCNC: 130 MG/DL (ref 0–149)
TSH SERPL DL<=0.005 MIU/L-ACNC: 2.88 MIU/ML (ref 0.27–4.2)

## 2019-01-15 ENCOUNTER — OFFICE VISIT (OUTPATIENT)
Dept: INTERNAL MEDICINE | Facility: CLINIC | Age: 84
End: 2019-01-15

## 2019-01-15 ENCOUNTER — HOSPITAL ENCOUNTER (OUTPATIENT)
Dept: CT IMAGING | Facility: HOSPITAL | Age: 84
Discharge: HOME | End: 2019-01-15
Admitting: INTERNAL MEDICINE

## 2019-01-15 VITALS
DIASTOLIC BLOOD PRESSURE: 52 MMHG | OXYGEN SATURATION: 97 % | WEIGHT: 118 LBS | HEIGHT: 60 IN | HEART RATE: 83 BPM | BODY MASS INDEX: 23.16 KG/M2 | SYSTOLIC BLOOD PRESSURE: 104 MMHG

## 2019-01-15 DIAGNOSIS — R91.1 LEFT LOWER LOBE PULMONARY NODULE: ICD-10-CM

## 2019-01-15 DIAGNOSIS — M85.89 OSTEOPENIA OF MULTIPLE SITES: Chronic | ICD-10-CM

## 2019-01-15 DIAGNOSIS — E78.5 HYPERLIPIDEMIA, UNSPECIFIED HYPERLIPIDEMIA TYPE: Chronic | ICD-10-CM

## 2019-01-15 DIAGNOSIS — Z86.79 HISTORY OF ATRIAL FLUTTER: Chronic | ICD-10-CM

## 2019-01-15 DIAGNOSIS — M17.0 PRIMARY OSTEOARTHRITIS OF BOTH KNEES: Chronic | ICD-10-CM

## 2019-01-15 DIAGNOSIS — E55.9 VITAMIN D DEFICIENCY: Chronic | ICD-10-CM

## 2019-01-15 DIAGNOSIS — Z95.0 HISTORY OF PERMANENT CARDIAC PACEMAKER PLACEMENT: Chronic | ICD-10-CM

## 2019-01-15 DIAGNOSIS — Z78.9 STATIN INTOLERANCE: Chronic | ICD-10-CM

## 2019-01-15 DIAGNOSIS — I10 BENIGN ESSENTIAL HYPERTENSION: Chronic | ICD-10-CM

## 2019-01-15 DIAGNOSIS — E03.8 SUBCLINICAL HYPOTHYROIDISM: Chronic | ICD-10-CM

## 2019-01-15 DIAGNOSIS — Z51.81 THERAPEUTIC DRUG MONITORING: ICD-10-CM

## 2019-01-15 DIAGNOSIS — I25.10 OCCLUSIVE CORONARY ARTERY DISEASE: Chronic | ICD-10-CM

## 2019-01-15 DIAGNOSIS — R73.01 IMPAIRED FASTING GLUCOSE: Primary | Chronic | ICD-10-CM

## 2019-01-15 DIAGNOSIS — I34.0 MODERATE MITRAL REGURGITATION: Chronic | ICD-10-CM

## 2019-01-15 PROBLEM — Z23 NEED FOR INFLUENZA VACCINATION: Status: RESOLVED | Noted: 2018-09-28 | Resolved: 2019-01-15

## 2019-01-15 PROCEDURE — 71250 CT THORAX DX C-: CPT

## 2019-01-15 PROCEDURE — 99214 OFFICE O/P EST MOD 30 MIN: CPT | Performed by: INTERNAL MEDICINE

## 2019-01-15 NOTE — PROGRESS NOTES
01/15/2019    Patient Information  Lora Serrano                                                                                          [id number] [facility] [address]      7/15/1926  [unfilled]  There is no work phone number on file.    Chief Complaint:     Follow-up impaired fasting glucose, hyperlipidemia, hypertension, subclinical hypothyroidism, occlusive coronary artery disease and history of permanent cardiac pacemaker, history of atrial flutter, moderate mitral regurgitation, vitamin D deficiency, osteopenia, osteoarthritis of both knees.  No new acute complaints.    History of Present Illness:    Patient with a history of medical problems as outlined in chief complaint presents today for follow-up with lab prior in order to monitor her chronic medical issues.  She recently went to the urgent care at [place] with complaints of cough for several days.  She reports her symptoms have resolved.  However, a chest x-ray revealed a possible pulmonary nodule and the history regarding this will be described below.  Also patient has been having some problems with her knees and she saw the orthopedist assistant recently who sylwia fluid off of her knee and gave her a steroid shot.  Symptoms are much better now.  Past medical history reviewed and updated where necessary including health maintenance parameters.  This reveals she is up-to-date or else accounted for.    The history regarding left lower lobe pulmonary nodule:    01/15/2019--patient seen in follow-up and reports her lower respiratory symptoms have resolved.  She has not yet had the CT scan of the chest and we will get this scheduled in the near future.  I have arranged for this to be done as soon as possible this visit is over today.    12/31/2018--chest x-ray PA and lateral ordered by the emergency room for complaints of several days of coughing revealed a nodular density within the left base of uncertain etiology and  significance.  It measures up to 1.5 cm in diameter and overlies the heart.  Recommend CT scan of the chest.  Otherwise no acute abnormality seen.    Review of Systems   Constitution: Negative.   HENT: Negative.    Eyes: Negative.    Cardiovascular: Negative.    Respiratory: Negative.    Endocrine: Negative.    Hematologic/Lymphatic: Negative.    Skin: Negative.    Musculoskeletal: Negative.    Gastrointestinal: Negative.    Genitourinary: Negative.    Neurological: Negative.    Psychiatric/Behavioral: Negative.    Allergic/Immunologic: Negative.        Active Problems:    Patient Active Problem List   Diagnosis   • Occlusive coronary artery disease, 12/11/2000--status post 5 vessel CABG   • Subclinical hypothyroidism   • Impaired fasting glucose   • Onychomycosis of fingernails   • Osteopenia, 01/31/2011--lumbar spine -2.2, left hip -1.0.   • Vitamin D deficiency   • Hyperlipidemia   • Benign essential hypertension   • Generalized osteoarthritis of multiple sites   • Therapeutic drug monitoring   • History of atrial flutter, 2003--successful catheter ablation for persistent atrial flutter.   • Moderate mitral regurgitation, 02/18/2012--moderate MR, EF 58%.   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 2006--PM replacement.  Medtronic EnRhythm [id number]. 11/30/2015--PM generator change.   • Primary osteoarthritis of both knees   • Statin intolerance   • Left lower lobe pulmonary nodule         Past Medical History:   Diagnosis Date   • Benign essential hypertension 3/14/2016   • Generalized osteoarthritis of multiple sites 3/14/2016   • History of atrial flutter, 2003--successful catheter ablation for persistent atrial flutter. 2003 2003--catheter ablation for persistent atrial flutter was successful.   • History of complete AV block, 10/26/2006--permanent pacemaker replacement.  12/18/2000--dual-chamber pacemaker placement for sick sinus syndrome, atrial flutter, AV  block after CABG. 12/18/2000 12/18/2000--dual-chamber pacemaker placement for sick sinus syndrome, atrial flutter, AV block that occurred after her bypass surgery. 10/26/2006--status post pacemaker replacement. Medtronic EnRhythm [id number]   • History of drug rash, 01/11/2014--name brand Synthroid 01/11/2014 01/11/2014--patient presented with a diffuse erythematous rash that was very pruritic. Examination consistent with a drug reaction/allergic dermatitis. Believed to be secondary to name brand Synthroid. This was discontinued and patient treated with a Medrol Dosepak.   • History of osteoporosis, June 2001 osteoporosis of the LS spine.  2011--improved to osteopenia only. 2011,2001 01/31/2011--DEXA scan  revealed LS spine T score -2.2, left hip -1.0.  Now osteopenia.   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 2006--PM replacement.  Medtronic EnRhythm [id number]. 11/30/2015--PM generator change. 11/30/2015 11/30/2015--pacemaker generator change.  10/26/2006--status post pacemaker replacement. Medtronic EnRhythm [id number]  12/18/2000--dual-chamber pacemaker placement for sick sinus syndrome, atrial flutter, AV block that occurred after her bypass surgery.   2000--dual-chamber PM for SSS, A flutter, AVB after CABG. 2006--PM replacement.  Medtronic EnRhythm [id number]. 11/30/2015--PM generator change.   • Hyperlipidemia 3/14/2016   • Impaired fasting glucose 3/16/2016    03/16/2015--type 2 diabetes is now evolved into impaired fasting glucose.  Hemoglobin A1c 5.7.  Diagnosed January 2010   • Moderate mitral regurgitation, 02/18/2012--moderate MR, EF 58%. 1/18/2012 01/18/2012 revealed ejection fraction of 58%, moderate mitral regurgitation.   • Occlusive coronary artery disease, 12/11/2000--status post 5 vessel CABG 12/11/2000 12/11/2000--status post 5 vessel CABG.  Patient subsequently had chest pain from  her sternal wires which were removed.   • Onychomycosis of fingernails 3/14/2016    Patient has had onychomycosis involving her fingernails for several years.  She tries to control it with Ertaczo cream.   • Osteopenia, 01/31/2011--lumbar spine -2.2, left hip -1.0. 6/1/2001 01/31/2011--DEXA scan  revealed LS spine T score -2.2, left hip -1.0.  Now osteopenia.   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   • Primary osteoarthritis of both knees 9/28/2016 09/28/2016--patient seen in follow-up and continues to have bilateral knee discomfort particularly with prolonged use.  Right knee is worse than the left.  She understandably does not want have a knee replacement but I think she would benefit from orthopedic evaluation for consideration of cortisone or visco supplementation.  Patient referred to Dr. Wallace.  08/26/2016--x-ray of the right knee reveals very severe degenerative changes involving the medial compartment with marked joint space narrowing and subchondral sclerosis and marginal spurring.  No joint effusion and no acute pathology.   • Statin intolerance 3/28/2017    Intolerance to multiple statins.   • Status post catheter ablation of atrial flutter, 2002--successful catheter ablation for persistent atrial flutter. 2003 2003--catheter ablation for persistent atrial flutter was successful.   • Subclinical hypothyroidism 5/9/2013    Diagnosed 05/16/2013.   05/09/2013--ultrasound thyroid for new hypothyroidism revealed a small thyroid gland consistent with hypothyroidism.  Mixed nodule present.   • Vitamin D deficiency 3/14/2016         Past Surgical History:   Procedure Laterality Date   • CARDIAC ABLATION  07/02/2003 07/02/2003--catheter ablation for persistent atrial flutter was successful.   • CARDIAC PACEMAKER PLACEMENT  12/18/2000 12/18/2000--dual-chamber pacemaker placement for sick sinus syndrome, atrial flutter, AV block that occurred after her bypass surgery.   • CARPAL TUNNEL  RELEASE Right 01/2010 January 2010--status post right carpal tunnel release. No surgical treatment on left.   • CORONARY ARTERY BYPASS GRAFT  12/2000 December 2000--coronary artery bypass x 5   • HX OVARIAN CYSTECTOMY  Remote    Remote ovarian cyst excision.   • INCISIONAL BREAST BIOPSY  Remote    Benign   • PACEMAKER REPLACEMENT  10/26/2006    10/26/2006--status post pacemaker replacement. Medtronic EnRhythm [id number]    • PACEMAKER REPLACEMENT  11/30/2015 11/30/2015--pacemaker generator change.         Allergies   Allergen Reactions   • Atorvastatin    • Levothyroxine Sodium    • Other      Seafood   • Penicillins            Current Outpatient Medications:   •  aspirin 325 MG tablet, Take 325 mg by mouth Daily., Disp: , Rfl:   •  Cholecalciferol (VITAMIN D) 1000 UNITS tablet, Take 1 tablet by mouth daily., Disp: , Rfl:   •  clotrimazole-betamethasone (LOTRISONE) 1-0.05 % cream, , Disp: , Rfl:   •  Coenzyme Q10 (COQ10) 400 MG capsule, Take 1 capsule by mouth daily. With food, Disp: , Rfl:   •  doxazosin (CARDURA) 4 MG tablet, Take 1 tablet by mouth Every Morning., Disp: 90 tablet, Rfl: 1  •  metoprolol succinate XL (TOPROL-XL) 50 MG 24 hr tablet, Take 1 tablet by mouth Daily., Disp: 90 tablet, Rfl: 1  •  metroNIDAZOLE (METROGEL) 0.75 % gel, , Disp: , Rfl:   •  olmesartan-hydrochlorothiazide (BENICAR HCT) 40-25 MG per tablet, Take 1 tablet by mouth Daily. FOR BLOOD PRESSURE, Disp: 90 tablet, Rfl: 1  •  Red Yeast Rice 600 MG tablet, Take 2 tablets by mouth every day., Disp: , Rfl:       Family History   Problem Relation Age of Onset   • Hyperlipidemia Mother    • Breast cancer Sister    • Diabetes type II Sister          Social History     Socioeconomic History   • Marital status:      Spouse name: Not on file   • Number of children: 2   • Years of education: Not on file   • Highest education level: 12th grade   Social Needs   • Financial resource strain: Not hard at all   • Food insecurity - worry:  "Never true   • Food insecurity - inability: Never true   • Transportation needs - medical: No   • Transportation needs - non-medical: No   Occupational History   • Occupation: Retired   Tobacco Use   • Smoking status: Never Smoker   • Smokeless tobacco: Never Used   Substance and Sexual Activity   • Alcohol use: No   • Drug use: No   • Sexual activity: Not Currently     Partners: Male   Other Topics Concern   • Not on file   Social History Narrative   • Not on file         Vitals:    01/15/19 0931   BP: 104/52   Pulse: 83   SpO2: 97%   Weight: 53.5 kg (118 lb)   Height: 152.4 cm (60\")          Physical Exam:    General: Alert and oriented x 3.  No acute distress.  Normal affect.  HEENT: Pupils equal, round, reactive to light; extraocular movements intact; sclerae nonicteric; pharynx, ear canals and TMs normal.  Neck: Without JVD, thyromegaly, bruit, or adenopathy.  Lungs: Clear to auscultation in all fields.  Heart: Regular rate and rhythm without murmur, rub, gallop, or click.  Abdomen: Soft, nontender, without hepatosplenomegaly or hernia.  Bowel sounds normal.  : Deferred.  Rectal: Deferred.  Extremities: Without clubbing, cyanosis, edema, or pulse deficit.  Neurologic: Intact without focal deficit.  Normal station and gait observed during ingress and egress from the examination room.  Skin: Without significant lesion.  Musculoskeletal: Unremarkable.    Lab/other results:    I reviewed the results of the recent chest x-ray PA and lateral.    NMR reveals a total cholesterol 235.  Triglycerides are 130.  LDL particle number elevated at 1846.  However, small LDL particle numbers excellent at 515.  HDL particle number normal at 31.4.  CMP normal except sodium is a little low at 133.  Hemoglobin A1c 6.06.  Thyroid function tests are normal.  Vitamin D normal.    Assessment/Plan:     Diagnosis Plan   1. Impaired fasting glucose     2. Hyperlipidemia, unspecified hyperlipidemia type     3. Benign essential " hypertension     4. Subclinical hypothyroidism     5. Occlusive coronary artery disease, 12/11/2000--status post 5 vessel CABG     6. Vitamin D deficiency     7. Osteopenia of multiple sites     8. History of permanent cardiac PM, 2000--dual-chamber PM for SSS, A flutter, AVB after CABG. 2006--PM replacement.  Medtronic EnRhythm [id number]. 11/30/2015--PM generator change.     9. Statin intolerance     10. Left lower lobe pulmonary nodule     11. History of atrial flutter, 2003--successful catheter ablation for persistent atrial flutter.     12. Moderate mitral regurgitation, 02/18/2012--moderate MR, EF 58%.     13. Primary osteoarthritis of both knees     14. Therapeutic drug monitoring         Patient has very mild impaired fasting glucose at does not require medication.  She has hyperlipidemia that is under the best control but we can get it reasonably given the fact that she is intolerant to statins.  Blood pressure well controlled.  Subclinical hypothyroidism seems less likely but we will continue to monitor.  Her coronary artery disease is stable.  Vitamin D therapeutic.  Patient has osteopenia and we have decided not to do any further bone density test given the overall clinical picture including her age.  Patient has a permanent pacemaker in place and this is monitored by the cardiologist.  There is a new left lower lobe nodule that needs further evaluation with a CT scan.  Her valvular heart disease is stable and there is no evidence of recurrence of atrial flutter.    Plan is as follows: CT scan of the chest without contrast which will be done today at 11:00.  Patient will follow-up on the phone for the results.  Patient is aware of the shingles vaccine and the hepatitis A vaccine.  Patient will follow-up after 07/09/2019 with lab prior and this will be her subsequent Medicare wellness visit also.      Procedures

## 2019-02-27 RX ORDER — METOPROLOL SUCCINATE 50 MG/1
50 TABLET, EXTENDED RELEASE ORAL DAILY
Qty: 90 TABLET | Refills: 1 | Status: SHIPPED | OUTPATIENT
Start: 2019-02-27 | End: 2019-07-16 | Stop reason: SDUPTHER

## 2019-02-27 RX ORDER — DOXAZOSIN MESYLATE 4 MG/1
4 TABLET ORAL EVERY MORNING
Qty: 90 TABLET | Refills: 1 | Status: SHIPPED | OUTPATIENT
Start: 2019-02-27 | End: 2019-07-16

## 2019-03-18 RX ORDER — CLOTRIMAZOLE AND BETAMETHASONE DIPROPIONATE 10; .64 MG/G; MG/G
CREAM TOPICAL
Qty: 45 G | Refills: 2 | Status: SHIPPED | OUTPATIENT
Start: 2019-03-18 | End: 2020-07-05

## 2019-03-20 ENCOUNTER — CLINICAL SUPPORT NO REQUIREMENTS (OUTPATIENT)
Dept: CARDIOLOGY | Facility: CLINIC | Age: 84
End: 2019-03-20

## 2019-03-20 DIAGNOSIS — I44.2 COMPLETE HEART BLOCK (HCC): Primary | ICD-10-CM

## 2019-03-20 PROCEDURE — 93288 INTERROG EVL PM/LDLS PM IP: CPT | Performed by: INTERNAL MEDICINE

## 2019-05-30 RX ORDER — OLMESARTAN MEDOXOMIL AND HYDROCHLOROTHIAZIDE 40/25 40; 25 MG/1; MG/1
1 TABLET ORAL DAILY
Qty: 90 TABLET | Refills: 1 | Status: SHIPPED | OUTPATIENT
Start: 2019-05-30 | End: 2019-07-16 | Stop reason: RX

## 2019-06-04 ENCOUNTER — TELEPHONE (OUTPATIENT)
Dept: INTERNAL MEDICINE | Facility: CLINIC | Age: 84
End: 2019-06-04

## 2019-06-04 NOTE — TELEPHONE ENCOUNTER
Pt pharm called and said they did not have pt BP medicine olmesartan/hctz 40/25 and could they split it.  I said fine just make sure pt understands it same med just in 2 pills

## 2019-06-05 ENCOUNTER — TELEPHONE (OUTPATIENT)
Dept: INTERNAL MEDICINE | Facility: CLINIC | Age: 84
End: 2019-06-05

## 2019-06-05 NOTE — TELEPHONE ENCOUNTER
Benicar 20 mg/day.  Hydrochlorothiazide 12.5 mg/day.  Inform patient she needs to take 1 of each and that will be the same and correct dose.

## 2019-06-05 NOTE — TELEPHONE ENCOUNTER
Pt benicar hct 40/25 is out of stock so they gave pt the 2 different medications.  But she cuts her 40/25 in half and these are to small and she is 89 and already cuts her cardura and does not want to half to cut this pill to.    Can you change her to something that she does not have to cut in half.

## 2019-06-21 ENCOUNTER — CLINICAL SUPPORT NO REQUIREMENTS (OUTPATIENT)
Dept: CARDIOLOGY | Facility: CLINIC | Age: 84
End: 2019-06-21

## 2019-06-21 ENCOUNTER — TELEPHONE (OUTPATIENT)
Dept: CARDIOLOGY | Facility: CLINIC | Age: 84
End: 2019-06-21

## 2019-06-21 DIAGNOSIS — I44.2 COMPLETE HEART BLOCK (HCC): Primary | ICD-10-CM

## 2019-06-21 PROCEDURE — 93280 PM DEVICE PROGR EVAL DUAL: CPT | Performed by: INTERNAL MEDICINE

## 2019-06-21 NOTE — TELEPHONE ENCOUNTER
Patient in the office today for a pacer check - device has normal function and no episodes.     She is c/o 2-3 instances of chest pain. Each time last about 15 minutes. These occurred under high emotional stress. She does not remember exactly when they happened but thinks last one was a couple weeks ago. She states this is new for her.

## 2019-07-08 DIAGNOSIS — E78.5 HYPERLIPIDEMIA, UNSPECIFIED HYPERLIPIDEMIA TYPE: Chronic | ICD-10-CM

## 2019-07-08 DIAGNOSIS — E03.8 SUBCLINICAL HYPOTHYROIDISM: Chronic | ICD-10-CM

## 2019-07-08 DIAGNOSIS — R73.01 IMPAIRED FASTING GLUCOSE: Chronic | ICD-10-CM

## 2019-07-08 DIAGNOSIS — E55.9 VITAMIN D DEFICIENCY: Chronic | ICD-10-CM

## 2019-07-08 DIAGNOSIS — Z51.81 THERAPEUTIC DRUG MONITORING: ICD-10-CM

## 2019-07-10 LAB
25(OH)D3+25(OH)D2 SERPL-MCNC: 49.6 NG/ML (ref 30–100)
ALBUMIN SERPL-MCNC: 4 G/DL (ref 3.5–5.2)
ALBUMIN/GLOB SERPL: 1.9 G/DL
ALP SERPL-CCNC: 69 U/L (ref 39–117)
ALT SERPL-CCNC: 12 U/L (ref 1–33)
AST SERPL-CCNC: 16 U/L (ref 1–32)
BILIRUB SERPL-MCNC: 0.3 MG/DL (ref 0.2–1.2)
BUN SERPL-MCNC: 23 MG/DL (ref 8–23)
BUN/CREAT SERPL: 28.4 (ref 7–25)
CALCIUM SERPL-MCNC: 9.2 MG/DL (ref 8.2–9.6)
CHLORIDE SERPL-SCNC: 93 MMOL/L (ref 98–107)
CHOLEST SERPL-MCNC: 239 MG/DL (ref 100–199)
CO2 SERPL-SCNC: 29.4 MMOL/L (ref 22–29)
CREAT SERPL-MCNC: 0.81 MG/DL (ref 0.57–1)
ERYTHROCYTE [DISTWIDTH] IN BLOOD BY AUTOMATED COUNT: 14 % (ref 12.3–15.4)
GLOBULIN SER CALC-MCNC: 2.1 GM/DL
GLUCOSE SERPL-MCNC: 99 MG/DL (ref 65–99)
HBA1C MFR BLD: 6.1 % (ref 4.8–5.6)
HCT VFR BLD AUTO: 37.2 % (ref 34–46.6)
HDL SERPL-SCNC: 34.6 UMOL/L
HDLC SERPL-MCNC: 63 MG/DL
HGB BLD-MCNC: 11.9 G/DL (ref 12–15.9)
LDL SERPL QN: 21.4 NM
LDL SERPL-SCNC: 1628 NMOL/L
LDL SMALL SERPL-SCNC: 436 NMOL/L
LDLC SERPL CALC-MCNC: 160 MG/DL (ref 0–99)
MCH RBC QN AUTO: 28.7 PG (ref 26.6–33)
MCHC RBC AUTO-ENTMCNC: 32 G/DL (ref 31.5–35.7)
MCV RBC AUTO: 89.6 FL (ref 79–97)
PLATELET # BLD AUTO: 206 10*3/MM3 (ref 140–450)
POTASSIUM SERPL-SCNC: 4.5 MMOL/L (ref 3.5–5.2)
PROT SERPL-MCNC: 6.1 G/DL (ref 6–8.5)
RBC # BLD AUTO: 4.15 10*6/MM3 (ref 3.77–5.28)
SODIUM SERPL-SCNC: 134 MMOL/L (ref 136–145)
T3FREE SERPL-MCNC: 2.9 PG/ML (ref 2–4.4)
T4 FREE SERPL-MCNC: 1.24 NG/DL (ref 0.93–1.7)
TRIGL SERPL-MCNC: 81 MG/DL (ref 0–149)
TSH SERPL DL<=0.005 MIU/L-ACNC: 4.31 MIU/ML (ref 0.27–4.2)
WBC # BLD AUTO: 5.74 10*3/MM3 (ref 3.4–10.8)

## 2019-07-16 ENCOUNTER — OFFICE VISIT (OUTPATIENT)
Dept: INTERNAL MEDICINE | Facility: CLINIC | Age: 84
End: 2019-07-16

## 2019-07-16 VITALS
DIASTOLIC BLOOD PRESSURE: 50 MMHG | SYSTOLIC BLOOD PRESSURE: 138 MMHG | BODY MASS INDEX: 23.71 KG/M2 | HEART RATE: 78 BPM | OXYGEN SATURATION: 97 % | HEIGHT: 60 IN | WEIGHT: 120.8 LBS

## 2019-07-16 DIAGNOSIS — M85.89 OSTEOPENIA OF MULTIPLE SITES: Chronic | ICD-10-CM

## 2019-07-16 DIAGNOSIS — Z51.81 THERAPEUTIC DRUG MONITORING: ICD-10-CM

## 2019-07-16 DIAGNOSIS — M17.0 PRIMARY OSTEOARTHRITIS OF BOTH KNEES: Chronic | ICD-10-CM

## 2019-07-16 DIAGNOSIS — Z95.0 HISTORY OF PERMANENT CARDIAC PACEMAKER PLACEMENT: Chronic | ICD-10-CM

## 2019-07-16 DIAGNOSIS — M15.9 GENERALIZED OSTEOARTHRITIS OF MULTIPLE SITES: Chronic | ICD-10-CM

## 2019-07-16 DIAGNOSIS — E78.5 HYPERLIPIDEMIA, UNSPECIFIED HYPERLIPIDEMIA TYPE: Chronic | ICD-10-CM

## 2019-07-16 DIAGNOSIS — I10 BENIGN ESSENTIAL HYPERTENSION: Chronic | ICD-10-CM

## 2019-07-16 DIAGNOSIS — E55.9 VITAMIN D DEFICIENCY: Chronic | ICD-10-CM

## 2019-07-16 DIAGNOSIS — Z78.9 STATIN INTOLERANCE: Chronic | ICD-10-CM

## 2019-07-16 DIAGNOSIS — E03.8 SUBCLINICAL HYPOTHYROIDISM: Chronic | ICD-10-CM

## 2019-07-16 DIAGNOSIS — I34.81 MITRAL VALVE ANNULAR CALCIFICATION: ICD-10-CM

## 2019-07-16 DIAGNOSIS — I25.10 OCCLUSIVE CORONARY ARTERY DISEASE: Chronic | ICD-10-CM

## 2019-07-16 DIAGNOSIS — I34.0 MODERATE MITRAL REGURGITATION: Chronic | ICD-10-CM

## 2019-07-16 DIAGNOSIS — Z86.79 HISTORY OF ATRIAL FLUTTER: Chronic | ICD-10-CM

## 2019-07-16 DIAGNOSIS — Z00.00 MEDICARE ANNUAL WELLNESS VISIT, SUBSEQUENT: Primary | ICD-10-CM

## 2019-07-16 DIAGNOSIS — R91.1 LEFT LOWER LOBE PULMONARY NODULE: ICD-10-CM

## 2019-07-16 DIAGNOSIS — R73.01 IMPAIRED FASTING GLUCOSE: Chronic | ICD-10-CM

## 2019-07-16 PROCEDURE — G0439 PPPS, SUBSEQ VISIT: HCPCS | Performed by: INTERNAL MEDICINE

## 2019-07-16 PROCEDURE — 99214 OFFICE O/P EST MOD 30 MIN: CPT | Performed by: INTERNAL MEDICINE

## 2019-07-16 RX ORDER — OLMESARTAN MEDOXOMIL 20 MG/1
TABLET ORAL
COMMUNITY
Start: 2019-07-06 | End: 2019-07-16

## 2019-07-16 RX ORDER — DOXAZOSIN 2 MG/1
TABLET ORAL
Qty: 90 TABLET | Refills: 3 | Status: SHIPPED | OUTPATIENT
Start: 2019-07-16 | End: 2020-07-06

## 2019-07-16 RX ORDER — VALSARTAN AND HYDROCHLOROTHIAZIDE 160; 12.5 MG/1; MG/1
TABLET, FILM COATED ORAL
Qty: 90 TABLET | Refills: 3 | Status: SHIPPED | OUTPATIENT
Start: 2019-07-16 | End: 2020-07-06

## 2019-07-16 RX ORDER — ASPIRIN 81 MG/1
81 TABLET, CHEWABLE ORAL DAILY
COMMUNITY
End: 2019-07-16

## 2019-07-16 RX ORDER — HYDROCHLOROTHIAZIDE 12.5 MG/1
TABLET ORAL
COMMUNITY
Start: 2019-07-02 | End: 2019-07-16

## 2019-07-16 RX ORDER — METOPROLOL SUCCINATE 50 MG/1
TABLET, EXTENDED RELEASE ORAL
Qty: 90 TABLET | Refills: 3 | Status: SHIPPED | OUTPATIENT
Start: 2019-07-16 | End: 2020-01-01

## 2019-07-16 NOTE — PROGRESS NOTES
The ABCs of the Annual Wellness Visit  Subsequent Medicare Wellness Visit    No chief complaint on file.      Subjective   History of Present Illness:  Lora Serrano is a 89 y.o. female who presents for a Subsequent Medicare Wellness Visit.    HEALTH RISK ASSESSMENT    Recent Hospitalizations:  No hospitalization(s) within the last year.    Current Medical Providers:  Patient Care Team:  Adilson Mix MD as PCP - General (Internal Medicine)  Lora Hope PA as PCP - Claims Attributed    Smoking Status:  Social History     Tobacco Use   Smoking Status Never Smoker   Smokeless Tobacco Never Used       Alcohol Consumption:  Social History     Substance and Sexual Activity   Alcohol Use No       Depression Screen:   PHQ-2/PHQ-9 Depression Screening 1/15/2019   Little interest or pleasure in doing things 0   Feeling down, depressed, or hopeless 0   Trouble falling or staying asleep, or sleeping too much -   Feeling tired or having little energy -   Poor appetite or overeating -   Feeling bad about yourself - or that you are a failure or have let yourself or your family down -   Trouble concentrating on things, such as reading the newspaper or watching television -   Moving or speaking so slowly that other people could have noticed. Or the opposite - being so fidgety or restless that you have been moving around a lot more than usual -   Thoughts that you would be better off dead, or of hurting yourself in some way -   Total Score 0   If you checked off any problems, how difficult have these problems made it for you to do your work, take care of things at home, or get along with other people? -       Fall Risk Screen:  [facility] not [facility].    Health Habits and Functional and Cognitive Screening:  Functional & Cognitive Status 7/16/2019   Do you have difficulty preparing food and eating? No   Do you have difficulty bathing yourself, getting dressed or grooming yourself? No    Do you have difficulty using the toilet? No   Do you have difficulty moving around from place to place? No   Do you have trouble with steps or getting out of a bed or a chair? Yes   Current Diet Well Balanced Diet   Dental Exam Up to date   Eye Exam Up to date   Exercise (times per week) 0 times per week   Current Exercise Activities Include None   Do you need help using the phone?  No   Are you deaf or do you have serious difficulty hearing?  Yes   Do you need help with transportation? Yes   Do you need help shopping? Yes   Do you need help preparing meals?  No   Do you need help with housework?  No   Do you need help with laundry? No   Do you need help taking your medications? No   Do you need help managing money? No   Do you ever drive or ride in a car without wearing a seat belt? No   Have you felt unusual stress, anger or loneliness in the last month? Yes   Who do you live with? Child   If you need help, do you have trouble finding someone available to you? No   Have you been bothered in the last four weeks by sexual problems? No   Do you have difficulty concentrating, remembering or making decisions? No         Does the patient have evidence of cognitive impairment? No    Asprin use counseling:Contraindicated from taking ASA    Age-appropriate Screening Schedule:  Refer to the list below for future screening recommendations based on patient's age, sex and/or medical conditions. Orders for these recommended tests are listed in the plan section. The patient has been provided with a written plan.    Health Maintenance   Topic Date Due   • INFLUENZA VACCINE  08/01/2019   • LIPID PANEL  07/09/2020   • TDAP/TD VACCINES (2 - Td) 03/28/2027   • PNEUMOCOCCAL VACCINES (65+ LOW/MEDIUM RISK)  Completed   • MAMMOGRAM  Discontinued   • DXA SCAN  Discontinued   • ZOSTER VACCINE  Discontinued          The following portions of the patient's history were reviewed and updated as appropriate: allergies, current medications,  past family history, past medical history, past social history, past surgical history and problem list.    Outpatient Medications Prior to Visit   Medication Sig Dispense Refill   • Cholecalciferol (VITAMIN D) 1000 UNITS tablet Take 1 tablet by mouth daily.     • clotrimazole-betamethasone (LOTRISONE) 1-0.05 % cream APPLY SPARINGLY TO AFFECTED AREA(S) 3 TIMES A DAY 45 g 2   • Coenzyme Q10 (COQ10) 400 MG capsule Take 1 capsule by mouth daily. With food     • Red Yeast Rice 600 MG tablet Take 2 tablets by mouth every day.     • aspirin 81 MG chewable tablet Chew 81 mg Daily.     • clotrimazole-betamethasone (LOTRISONE) 1-0.05 % cream      • doxazosin (CARDURA) 4 MG tablet Take 1 tablet by mouth Every Morning. 90 tablet 1   • metoprolol succinate XL (TOPROL-XL) 50 MG 24 hr tablet Take 1 tablet by mouth Daily. 90 tablet 1   • olmesartan-hydrochlorothiazide (BENICAR HCT) 40-25 MG per tablet Take 1 tablet by mouth Daily. FOR BLOOD PRESSURE 90 tablet 1   • ketoconazole (NIZORAL) 2 % cream Apply  topically to the appropriate area as directed Every 12 (Twelve) Hours. 15 g 0   • metroNIDAZOLE (METROGEL) 0.75 % gel      • aspirin 325 MG tablet Take 325 mg by mouth Daily.     • ciprofloxacin (CIPRO) 250 MG tablet Take 1 tablet by mouth 2 (Two) Times a Day. 20 tablet 0   • hydrochlorothiazide (HYDRODIURIL) 12.5 MG tablet      • olmesartan (BENICAR) 20 MG tablet        No facility-administered medications prior to visit.        Patient Active Problem List   Diagnosis   • Occlusive coronary artery disease, 12/11/2000--status post 5 vessel CABG   • Subclinical hypothyroidism   • Impaired fasting glucose   • Onychomycosis of fingernails   • Osteopenia, 01/31/2011--lumbar spine -2.2, left hip -1.0.   • Vitamin D deficiency   • Hyperlipidemia   • Benign essential hypertension   • Generalized osteoarthritis of multiple sites   • Therapeutic drug monitoring   • History of atrial flutter, 2003--successful catheter ablation for persistent  "atrial flutter.   • Moderate mitral regurgitation, 02/18/2012--moderate MR, EF 58%.   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 2006--PM replacement.  Medtronic EnRhythm [id number]. 11/30/2015--PM generator change.   • Primary osteoarthritis of both knees   • Statin intolerance   • Mitral valve annular calcification       Advanced Care Planning:  Patient has an advance directive - a copy has been provided and is visible in patient header, Patient does not have an advance directive - information provided to the patient today    Review of Systems   Cardiovascular: Positive for leg swelling.   Musculoskeletal: Positive for arthralgias.   Hematological:        Easy bruising   All other systems reviewed and are negative.      Compared to one year ago, the patient feels her physical health is worse.  Compared to one year ago, the patient feels her mental health is the same.    Reviewed chart for potential of high risk medication in the elderly: yes  Reviewed chart for potential of harmful drug interactions in the elderly:yes    Objective         Vitals:    07/16/19 0737   BP: 138/50   BP Location: Left arm   Pulse: 78   SpO2: 97%   Weight: 54.8 kg (120 lb 12.8 oz)   Height: 152.4 cm (60\")       Body mass index is 23.59 kg/m².  Discussed the patient's BMI with her. The BMI is in the acceptable range.    Physical Exam  General: Alert and oriented x 3.  No acute distress.  Normal affect.  HEENT: Pupils equal, round, reactive to light; extraocular movements intact; sclerae nonicteric; pharynx, ear canals and TMs normal.  Neck: Without JVD, thyromegaly, bruit, or adenopathy.  Lungs: Clear to auscultation in all fields.  Heart: Regular rate and rhythm without murmur, rub, gallop, or click.  Abdomen: Soft, nontender, without hepatosplenomegaly or hernia.  Bowel sounds normal.  : Deferred.  Rectal: Deferred.  Extremities: Without clubbing, cyanosis,  or pulse deficit.  There is " 1-2+ lower extremity edema in the left leg and perhaps trace of the right leg.  Neurologic: Intact without focal deficit.  Patient ambulates with the assistance of a 4 pronged cane.  Skin: Patient has very thin skin on her extremities and several bruises noted.  There is a significant bruise on her left lower extremity medially above the ankle as well as venous stasis changes.  Musculoskeletal: Unremarkable.    Lab Results   Component Value Date    GLU 99 07/09/2019    CHLPL 239 (H) 07/09/2019    TRIG 81 07/09/2019    HGBA1C 6.10 (H) 07/09/2019        Assessment/Plan   Medicare Risks and Personalized Health Plan  CMS Preventative Services Quick Reference  Fall Risk  Glaucoma Risk    The above risks/problems have been discussed with the patient.  Pertinent information has been shared with the patient in the After Visit Summary.  Follow up plans and orders are seen below in the Assessment/Plan Section.    Diagnoses and all orders for this visit:    1. Medicare annual wellness visit, subsequent (Primary)    2. Impaired fasting glucose  -     Comprehensive Metabolic Panel; Future  -     Hemoglobin A1c; Future    3. Hyperlipidemia, unspecified hyperlipidemia type  -     NMR LipoProfile; Future    4. Occlusive coronary artery disease, 12/11/2000--status post 5 vessel CABG    5. Subclinical hypothyroidism  -     TSH; Future  -     T4, Free; Future  -     T3, Free; Future    6. Osteopenia of multiple sites    7. Benign essential hypertension  -     doxazosin (CARDURA) 2 MG tablet; Take 1 p.o. daily for high blood pressure  Dispense: 90 tablet; Refill: 3  -     valsartan-hydrochlorothiazide (DIOVAN HCT) 160-12.5 MG per tablet; Take 1 p.o. every morning for high blood pressure  Dispense: 90 tablet; Refill: 3  -     metoprolol succinate XL (TOPROL-XL) 50 MG 24 hr tablet; Take 1 p.o. every morning for high blood pressure and heart  Dispense: 90 tablet; Refill: 3    8. History of atrial flutter, 2003--successful catheter  ablation for persistent atrial flutter.    9. Moderate mitral regurgitation, 02/18/2012--moderate MR, EF 58%.    10. History of permanent cardiac PM, 2000--dual-chamber PM for SSS, A flutter, AVB after CABG. 2006--PM replacement.  Medtronic EnRhythm [id number]. 11/30/2015--PM generator change.    11. Statin intolerance    12. Left lower lobe pulmonary nodule    13. Mitral valve annular calcification    14. Generalized osteoarthritis of multiple sites    15. Vitamin D deficiency  -     Vitamin D 25 Hydroxy; Future    16. Primary osteoarthritis of both knees    17. Therapeutic drug monitoring  -     CBC (No Diff); Future      Follow Up:  Return in about 6 months (around 1/16/2020) for Next scheduled follow up with lab prior.     An After Visit Summary and PPPS were given to the patient.

## 2019-07-16 NOTE — PROGRESS NOTES
07/16/2019    Patient Information  Lora Serrano                                                                                          [id number] [facility] [address]      7/15/1926  [unfilled]  There is no work phone number on file.    Chief Complaint:     Subsequent Medicare wellness visit.  Follow-up impaired fasting glucose, hyperlipidemia, coronary artery disease, subclinical hypothyroidism, osteopenia, hypertension, history of atrial flutter, catheter ablation, moderate mitral regurgitation and mitral annular calcification, history of cardiac pacemaker, statin intolerance, questionable left pulmonary lobe nodule, osteoarthritis including the knees.  No new acute complaints.    History of Present Illness:    Patient with a history of medical problems as outlined in the chief complaint of been fairly stable for the past year presents today for subsequent Medicare wellness visit.  She also had lab work in order to monitor her chronic medical issues.  Past medical history reviewed and updated were necessary including health maintenance parameters.  This reveals she will be up-to-date or else accounted for after today's visit.  Patient was noted to have a possible pulmonary nodule on her recent chest x-ray and history regarding this will be described below.    The history regarding questionable pulmonary nodule, mitral valve annular calcification, abnormal chest x-ray:    January 15, 2019--CT scan of the chest without contrast reveals nodular calcification within the mitral valve annulus that accounts for a nodular opacity in the left lower lung zone shown on the patient's recent chest x-ray.  No pulmonary nodules or infiltrates are currently identified.  Mild cardiomegaly.  Small fixed hiatal hernia.    01/15/2019--patient seen in follow-up and reports her lower respiratory symptoms have resolved.  She has not yet had the CT scan of the chest and we will get this scheduled in the near  future.  I have arranged for this to be done as soon as possible this visit is over today.    12/31/2018--chest x-ray PA and lateral ordered by the emergency room for complaints of several days of coughing revealed a nodular density within the left base of uncertain etiology and significance.  It measures up to 1.5 cm in diameter and overlies the heart.  Recommend CT scan of the chest.  Otherwise no acute abnormality seen.    Review of Systems   Constitution: Negative.   HENT: Negative.    Eyes: Negative.    Cardiovascular: Negative.    Respiratory: Negative.    Endocrine: Negative.    Hematologic/Lymphatic: Negative.    Skin: Negative.         Easy bruising   Musculoskeletal: Positive for arthritis and joint pain.   Gastrointestinal: Negative.    Genitourinary: Negative.    Neurological: Negative.    Psychiatric/Behavioral: Negative.    Allergic/Immunologic: Negative.        Active Problems:    Patient Active Problem List   Diagnosis   • Occlusive coronary artery disease, 12/11/2000--status post 5 vessel CABG   • Subclinical hypothyroidism   • Impaired fasting glucose   • Onychomycosis of fingernails   • Osteopenia, 01/31/2011--lumbar spine -2.2, left hip -1.0.   • Vitamin D deficiency   • Hyperlipidemia   • Benign essential hypertension   • Generalized osteoarthritis of multiple sites   • Therapeutic drug monitoring   • History of atrial flutter, 2003--successful catheter ablation for persistent atrial flutter.   • Moderate mitral regurgitation, 02/18/2012--moderate MR, EF 58%.   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 2006--PM replacement.  Medtronic EnRhythm [id number]. 11/30/2015--PM generator change.   • Primary osteoarthritis of both knees   • Statin intolerance   • Mitral valve annular calcification         Past Medical History:   Diagnosis Date   • Benign essential hypertension 3/14/2016   • Generalized osteoarthritis of multiple sites 3/14/2016   • History  of atrial flutter, 2003--successful catheter ablation for persistent atrial flutter. 2003 2003--catheter ablation for persistent atrial flutter was successful.   • History of complete AV block, 10/26/2006--permanent pacemaker replacement.  12/18/2000--dual-chamber pacemaker placement for sick sinus syndrome, atrial flutter, AV block after CABG. 12/18/2000 12/18/2000--dual-chamber pacemaker placement for sick sinus syndrome, atrial flutter, AV block that occurred after her bypass surgery. 10/26/2006--status post pacemaker replacement. Medtronic EnRhythm [id number]   • History of drug rash, 01/11/2014--name brand Synthroid 01/11/2014 01/11/2014--patient presented with a diffuse erythematous rash that was very pruritic. Examination consistent with a drug reaction/allergic dermatitis. Believed to be secondary to name brand Synthroid. This was discontinued and patient treated with a Medrol Dosepak.   • History of osteoporosis, June 2001 osteoporosis of the LS spine.  2011--improved to osteopenia only. 2011,2001 01/31/2011--DEXA scan  revealed LS spine T score -2.2, left hip -1.0.  Now osteopenia.   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 2006--PM replacement.  Medtronic EnRhythm [id number]. 11/30/2015--PM generator change. 11/30/2015 11/30/2015--pacemaker generator change.  10/26/2006--status post pacemaker replacement. Medtronic EnRhythm [id number]  12/18/2000--dual-chamber pacemaker placement for sick sinus syndrome, atrial flutter, AV block that occurred after her bypass surgery.   2000--dual-chamber PM for SSS, A flutter, AVB after CABG. 2006--PM replacement.  Medtronic EnRhythm [id number]. 11/30/2015--PM generator change.   • Hyperlipidemia 3/14/2016   • Impaired fasting glucose 3/16/2016    03/16/2015--type 2 diabetes is now evolved into impaired fasting glucose.  Hemoglobin A1c 5.7.  Diagnosed January  2010   • Mitral valve annular calcification 7/16/2019    January 15, 2019--CT scan of the chest without contrast reveals nodular calcification within the mitral valve annulus that accounts for a nodular opacity in the left lower lung zone shown on the patient's recent chest x-ray.  No pulmonary nodules or infiltrates are currently identified.  Mild cardiomegaly.  Small fixed hiatal hernia.  01/15/2019--patient seen in follow-up and reports her lower res   • Moderate mitral regurgitation, 02/18/2012--moderate MR, EF 58%. 1/18/2012 01/18/2012 revealed ejection fraction of 58%, moderate mitral regurgitation.   • Occlusive coronary artery disease, 12/11/2000--status post 5 vessel CABG 12/11/2000 12/11/2000--status post 5 vessel CABG.  Patient subsequently had chest pain from her sternal wires which were removed.   • Onychomycosis of fingernails 3/14/2016    Patient has had onychomycosis involving her fingernails for several years.  She tries to control it with Ertaczo cream.   • Osteopenia, 01/31/2011--lumbar spine -2.2, left hip -1.0. 6/1/2001 01/31/2011--DEXA scan  revealed LS spine T score -2.2, left hip -1.0.  Now osteopenia.   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   • Primary osteoarthritis of both knees 9/28/2016 09/28/2016--patient seen in follow-up and continues to have bilateral knee discomfort particularly with prolonged use.  Right knee is worse than the left.  She understandably does not want have a knee replacement but I think she would benefit from orthopedic evaluation for consideration of cortisone or visco supplementation.  Patient referred to Dr. Wallace.  08/26/2016--x-ray of the right knee reveals very severe degenerative changes involving the medial compartment with marked joint space narrowing and subchondral sclerosis and marginal spurring.  No joint effusion and no acute pathology.   • Statin intolerance 3/28/2017    Intolerance to multiple statins.   • Status post  catheter ablation of atrial flutter, 2002--successful catheter ablation for persistent atrial flutter. 2003 2003--catheter ablation for persistent atrial flutter was successful.   • Subclinical hypothyroidism 5/9/2013    Diagnosed 05/16/2013.   05/09/2013--ultrasound thyroid for new hypothyroidism revealed a small thyroid gland consistent with hypothyroidism.  Mixed nodule present.   • Vitamin D deficiency 3/14/2016         Past Surgical History:   Procedure Laterality Date   • CARDIAC ABLATION  07/02/2003 07/02/2003--catheter ablation for persistent atrial flutter was successful.   • CARDIAC PACEMAKER PLACEMENT  12/18/2000 12/18/2000--dual-chamber pacemaker placement for sick sinus syndrome, atrial flutter, AV block that occurred after her bypass surgery.   • CARPAL TUNNEL RELEASE Right 01/2010 January 2010--status post right carpal tunnel release. No surgical treatment on left.   • CORONARY ARTERY BYPASS GRAFT  12/2000 December 2000--coronary artery bypass x 5   • HX OVARIAN CYSTECTOMY  Remote    Remote ovarian cyst excision.   • INCISIONAL BREAST BIOPSY  Remote    Benign   • PACEMAKER REPLACEMENT  10/26/2006    10/26/2006--status post pacemaker replacement. Medtronic EnRhythm [id number]    • PACEMAKER REPLACEMENT  11/30/2015 11/30/2015--pacemaker generator change.         Allergies   Allergen Reactions   • Atorvastatin    • Levothyroxine Sodium    • Other      Seafood   • Penicillins            Current Outpatient Medications:   •  aspirin 81 MG chewable tablet, Chew 81 mg Daily., Disp: , Rfl:   •  Cholecalciferol (VITAMIN D) 1000 UNITS tablet, Take 1 tablet by mouth daily., Disp: , Rfl:   •  clotrimazole-betamethasone (LOTRISONE) 1-0.05 % cream, APPLY SPARINGLY TO AFFECTED AREA(S) 3 TIMES A DAY, Disp: 45 g, Rfl: 2  •  Coenzyme Q10 (COQ10) 400 MG capsule, Take 1 capsule by mouth daily. With food, Disp: , Rfl:   •  doxazosin (CARDURA) 4 MG tablet, Take 1 tablet by mouth Every Morning., Disp: 90  "tablet, Rfl: 1  •  metoprolol succinate XL (TOPROL-XL) 50 MG 24 hr tablet, Take 1 tablet by mouth Daily., Disp: 90 tablet, Rfl: 1  •  Red Yeast Rice 600 MG tablet, Take 2 tablets by mouth every day., Disp: , Rfl:   •  hydrochlorothiazide (HYDRODIURIL) 12.5 MG tablet, , Disp: , Rfl:   •  ketoconazole (NIZORAL) 2 % cream, Apply  topically to the appropriate area as directed Every 12 (Twelve) Hours., Disp: 15 g, Rfl: 0  •  metroNIDAZOLE (METROGEL) 0.75 % gel, , Disp: , Rfl:   •  olmesartan (BENICAR) 20 MG tablet, , Disp: , Rfl:       Family History   Problem Relation Age of Onset   • Hyperlipidemia Mother    • Breast cancer Sister    • Diabetes type II Sister          Social History     Socioeconomic History   • Marital status:      Spouse name: Not on file   • Number of children: 2   • Years of education: Not on file   • Highest education level: 12th grade   Occupational History   • Occupation: Retired   Social Needs   • Financial resource strain: Not hard at all   • Food insecurity:     Worry: Never true     Inability: Never true   • Transportation needs:     Medical: No     Non-medical: No   Tobacco Use   • Smoking status: Never Smoker   • Smokeless tobacco: Never Used   Substance and Sexual Activity   • Alcohol use: No   • Drug use: No   • Sexual activity: Not Currently     Partners: Male   Lifestyle   • Physical activity:     Days per week: 0 days     Minutes per session: 0 min   • Stress: To some extent   Relationships   • Social connections:     Talks on phone: More than three times a week     Gets together: Twice a week     Attends Gnosticism service: More than 4 times per year     Active member of club or organization: No     Attends meetings of clubs or organizations: Never     Relationship status:          Vitals:    07/16/19 0737   BP: 138/50   BP Location: Left arm   Pulse: 78   SpO2: 97%   Weight: 54.8 kg (120 lb 12.8 oz)   Height: 152.4 cm (60\")          Physical Exam:    General: Alert and " oriented x 3.  No acute distress.  Normal affect.  HEENT: Pupils equal, round, reactive to light; extraocular movements intact; sclerae nonicteric; pharynx, ear canals and TMs normal.  Neck: Without JVD, thyromegaly, bruit, or adenopathy.  Lungs: Clear to auscultation in all fields.  Heart: Regular rate and rhythm without murmur, rub, gallop, or click.  Abdomen: Soft, nontender, without hepatosplenomegaly or hernia.  Bowel sounds normal.  : Deferred.  Rectal: Deferred.  Extremities: Without clubbing, cyanosis,  or pulse deficit.  There is 1-2+ lower extremity edema in the left leg and perhaps trace of the right leg.  Neurologic: Intact without focal deficit.  Patient ambulates with the assistance of a 4 pronged cane.  Skin: Patient has very thin skin on her extremities and several bruises noted.  There is a significant bruise on her left lower extremity medially above the ankle as well as venous stasis changes.  Musculoskeletal: Unremarkable.    Lab/other results:    I reviewed the results of the CT scan of the chest that was obtained in January of this year.    NMR reveals a total cholesterol 239.  Triglycerides are normal at 81.  LDL particle number mildly elevated 1628.  Small LDL particle number is excellent at 436 and HDL particle number is normal at 34.6.  CMP normal except blood sugar 134.  CBC normal except hemoglobin slightly low 11.9 but hematocrit is normal at 37.2.  Hemoglobin A1c 6.1.  TSH is slightly elevated at 4.31.  Free T3 and free T4 are normal.  Vitamin D normal at 39.6.  Note also was made of normal platelets at 206.    Assessment/Plan:     Diagnosis Plan   1. Medicare annual wellness visit, subsequent     2. Impaired fasting glucose     3. Hyperlipidemia, unspecified hyperlipidemia type     4. Occlusive coronary artery disease, 12/11/2000--status post 5 vessel CABG     5. Subclinical hypothyroidism     6. Osteopenia of multiple sites     7. Benign essential hypertension     8. History of  atrial flutter, 2003--successful catheter ablation for persistent atrial flutter.     9. Moderate mitral regurgitation, 02/18/2012--moderate MR, EF 58%.     10. History of permanent cardiac PM, 2000--dual-chamber PM for SSS, A flutter, AVB after CABG. 2006--PM replacement.  Medtronic EnRhythm [id number]. 11/30/2015--PM generator change.     11. Statin intolerance     12. Left lower lobe pulmonary nodule     13. Mitral valve annular calcification     14. Generalized osteoarthritis of multiple sites     15. Vitamin D deficiency     16. Primary osteoarthritis of both knees     17. Therapeutic drug monitoring       The subsequent Medicare wellness visit is documented on separate note.    Patient has very mild impaired fasting glucose that does not require medication.  She has mild hyperlipidemia and is statin intolerant and I do not see any reason to add a medication such as Zetia, even though she does have occlusive coronary artery disease.  Her heart disease has been stable.  She does indeed have subclinical hypothyroidism but is not bad enough to warrant initiation of thyroid supplementation.  Her blood pressure is well controlled but patient would like to simplify her medical regimen and also it appears that Benicar is on back order.  Medication change indicated.  Patient did have successful catheter ablation back in 2003 for atrial flutter.  She has moderate mitral regurgitation and there was mitral valve annular calcification superimposed on her chest x-ray and there is no evidence of pulmonary nodule.  She does have permanent pacemaker in place.  This is followed by the cardiology service.  She does have generalized osteoarthritis and things are tolerable.  Vitamin D is therapeutic.  Patient does have complaints of easy bruising and is taking a baby aspirin daily and I think given the overall clinical picture and the severity of her bruising as well as her thin skin that we should discontinue the aspirin.    Plan is  as follows: Discontinue aspirin, Cardura 4 mg, HCTZ, and 20 mg olmesartan.  Patient will stay on metoprolol 50 mg/day.  Start valsartan /12.5, 1 p.o. daily.  Start doxazosin 2 mg/day.  I am certain this will adequately control her blood pressure and therefore I do not think she needs a follow-up appointment in a few weeks.  Instead, we will schedule her routine lab and follow-up for about 6 months as we have been doing.    Procedures

## 2019-09-24 ENCOUNTER — CLINICAL SUPPORT NO REQUIREMENTS (OUTPATIENT)
Dept: CARDIOLOGY | Facility: CLINIC | Age: 84
End: 2019-09-24

## 2019-09-24 DIAGNOSIS — I44.2 COMPLETE HEART BLOCK (HCC): Primary | ICD-10-CM

## 2019-09-24 PROCEDURE — 93288 INTERROG EVL PM/LDLS PM IP: CPT | Performed by: INTERNAL MEDICINE

## 2020-01-01 ENCOUNTER — LAB (OUTPATIENT)
Dept: LAB | Facility: HOSPITAL | Age: 85
End: 2020-01-01

## 2020-01-01 ENCOUNTER — OFFICE VISIT (OUTPATIENT)
Dept: CARDIOLOGY | Facility: CLINIC | Age: 85
End: 2020-01-01

## 2020-01-01 ENCOUNTER — TELEPHONE (OUTPATIENT)
Dept: INTERNAL MEDICINE | Facility: CLINIC | Age: 85
End: 2020-01-01

## 2020-01-01 ENCOUNTER — CLINICAL SUPPORT NO REQUIREMENTS (OUTPATIENT)
Dept: CARDIOLOGY | Facility: CLINIC | Age: 85
End: 2020-01-01

## 2020-01-01 ENCOUNTER — OFFICE VISIT (OUTPATIENT)
Dept: INTERNAL MEDICINE | Facility: CLINIC | Age: 85
End: 2020-01-01

## 2020-01-01 VITALS
BODY MASS INDEX: 23.83 KG/M2 | SYSTOLIC BLOOD PRESSURE: 142 MMHG | OXYGEN SATURATION: 98 % | WEIGHT: 118.2 LBS | DIASTOLIC BLOOD PRESSURE: 60 MMHG | HEART RATE: 73 BPM | HEIGHT: 59 IN

## 2020-01-01 VITALS
OXYGEN SATURATION: 98 % | HEART RATE: 60 BPM | SYSTOLIC BLOOD PRESSURE: 120 MMHG | BODY MASS INDEX: 23.48 KG/M2 | DIASTOLIC BLOOD PRESSURE: 46 MMHG | HEIGHT: 60 IN | WEIGHT: 119.6 LBS

## 2020-01-01 VITALS
DIASTOLIC BLOOD PRESSURE: 62 MMHG | HEIGHT: 60 IN | WEIGHT: 115 LBS | SYSTOLIC BLOOD PRESSURE: 110 MMHG | BODY MASS INDEX: 22.58 KG/M2 | HEART RATE: 63 BPM

## 2020-01-01 DIAGNOSIS — I48.21 PERMANENT ATRIAL FIBRILLATION (HCC): ICD-10-CM

## 2020-01-01 DIAGNOSIS — Z95.0 HISTORY OF PERMANENT CARDIAC PACEMAKER PLACEMENT: Chronic | ICD-10-CM

## 2020-01-01 DIAGNOSIS — Z86.79 HISTORY OF ATRIAL FLUTTER: Chronic | ICD-10-CM

## 2020-01-01 DIAGNOSIS — M85.89 OSTEOPENIA OF MULTIPLE SITES: Chronic | ICD-10-CM

## 2020-01-01 DIAGNOSIS — E03.8 SUBCLINICAL HYPOTHYROIDISM: Chronic | ICD-10-CM

## 2020-01-01 DIAGNOSIS — R73.01 IMPAIRED FASTING GLUCOSE: Primary | ICD-10-CM

## 2020-01-01 DIAGNOSIS — Z51.81 THERAPEUTIC DRUG MONITORING: ICD-10-CM

## 2020-01-01 DIAGNOSIS — Z51.89 THERAPEUTIC: ICD-10-CM

## 2020-01-01 DIAGNOSIS — I34.0 MODERATE MITRAL REGURGITATION: Chronic | ICD-10-CM

## 2020-01-01 DIAGNOSIS — E78.5 HYPERLIPIDEMIA, UNSPECIFIED HYPERLIPIDEMIA TYPE: ICD-10-CM

## 2020-01-01 DIAGNOSIS — I25.10 OCCLUSIVE CORONARY ARTERY DISEASE: Chronic | ICD-10-CM

## 2020-01-01 DIAGNOSIS — Z00.00 MEDICARE ANNUAL WELLNESS VISIT, SUBSEQUENT: Primary | ICD-10-CM

## 2020-01-01 DIAGNOSIS — R73.01 IMPAIRED FASTING GLUCOSE: Primary | Chronic | ICD-10-CM

## 2020-01-01 DIAGNOSIS — E78.2 MIXED HYPERLIPIDEMIA: Chronic | ICD-10-CM

## 2020-01-01 DIAGNOSIS — E55.9 VITAMIN D DEFICIENCY: ICD-10-CM

## 2020-01-01 DIAGNOSIS — Z78.9 STATIN INTOLERANCE: Chronic | ICD-10-CM

## 2020-01-01 DIAGNOSIS — I10 BENIGN ESSENTIAL HYPERTENSION: Chronic | ICD-10-CM

## 2020-01-01 DIAGNOSIS — I34.81 MITRAL VALVE ANNULAR CALCIFICATION: Chronic | ICD-10-CM

## 2020-01-01 DIAGNOSIS — I48.0 AF (PAROXYSMAL ATRIAL FIBRILLATION) (HCC): Primary | ICD-10-CM

## 2020-01-01 DIAGNOSIS — R73.01 IMPAIRED FASTING GLUCOSE: ICD-10-CM

## 2020-01-01 DIAGNOSIS — E03.9 HYPOTHYROIDISM, UNSPECIFIED TYPE: ICD-10-CM

## 2020-01-01 DIAGNOSIS — M15.9 GENERALIZED OSTEOARTHRITIS OF MULTIPLE SITES: Chronic | ICD-10-CM

## 2020-01-01 DIAGNOSIS — M17.0 PRIMARY OSTEOARTHRITIS OF BOTH KNEES: Chronic | ICD-10-CM

## 2020-01-01 DIAGNOSIS — I48.0 PAROXYSMAL ATRIAL FIBRILLATION (HCC): Chronic | ICD-10-CM

## 2020-01-01 DIAGNOSIS — E55.9 VITAMIN D DEFICIENCY: Chronic | ICD-10-CM

## 2020-01-01 DIAGNOSIS — I48.21 PERMANENT ATRIAL FIBRILLATION (HCC): Primary | ICD-10-CM

## 2020-01-01 DIAGNOSIS — Z79.01 CHRONIC ANTICOAGULATION: Chronic | ICD-10-CM

## 2020-01-01 DIAGNOSIS — R73.01 IMPAIRED FASTING GLUCOSE: Chronic | ICD-10-CM

## 2020-01-01 LAB
25(OH)D3 SERPL-MCNC: 39.1 NG/ML (ref 30–100)
ALBUMIN SERPL-MCNC: 4 G/DL (ref 3.5–5.2)
ALBUMIN/GLOB SERPL: 1.5 G/DL
ALP SERPL-CCNC: 71 U/L (ref 39–117)
ALT SERPL W P-5'-P-CCNC: 14 U/L (ref 1–33)
ANION GAP SERPL CALCULATED.3IONS-SCNC: 6.9 MMOL/L (ref 5–15)
AST SERPL-CCNC: 17 U/L (ref 1–32)
BILIRUB SERPL-MCNC: 0.3 MG/DL (ref 0–1.2)
BUN SERPL-MCNC: 19 MG/DL (ref 8–23)
BUN/CREAT SERPL: 23.8 (ref 7–25)
CALCIUM SPEC-SCNC: 9.5 MG/DL (ref 8.2–9.6)
CHLORIDE SERPL-SCNC: 95 MMOL/L (ref 98–107)
CHOLEST SERPL-MCNC: 238 MG/DL (ref 100–199)
CO2 SERPL-SCNC: 31.1 MMOL/L (ref 22–29)
CREAT SERPL-MCNC: 0.8 MG/DL (ref 0.57–1)
DEPRECATED RDW RBC AUTO: 41.6 FL (ref 37–54)
ERYTHROCYTE [DISTWIDTH] IN BLOOD BY AUTOMATED COUNT: 13.1 % (ref 12.3–15.4)
GFR SERPL CREATININE-BSD FRML MDRD: 67 ML/MIN/1.73
GLOBULIN UR ELPH-MCNC: 2.6 GM/DL
GLUCOSE SERPL-MCNC: 136 MG/DL (ref 65–99)
HBA1C MFR BLD: 6 % (ref 4.8–5.6)
HCT VFR BLD AUTO: 40.1 % (ref 34–46.6)
HDL SERPL-SCNC: 32.8 UMOL/L
HDLC SERPL-MCNC: 54 MG/DL
HGB BLD-MCNC: 13.3 G/DL (ref 12–15.9)
LDL SERPL QN: 21 NM
LDL SERPL-SCNC: 1904 NMOL/L
LDL SMALL SERPL-SCNC: 554 NMOL/L
LDLC SERPL CALC-MCNC: 158 MG/DL (ref 0–99)
MCH RBC QN AUTO: 28.9 PG (ref 26.6–33)
MCHC RBC AUTO-ENTMCNC: 33.2 G/DL (ref 31.5–35.7)
MCV RBC AUTO: 87.2 FL (ref 79–97)
PLATELET # BLD AUTO: 262 10*3/MM3 (ref 140–450)
PMV BLD AUTO: 9.6 FL (ref 6–12)
POTASSIUM SERPL-SCNC: 4.6 MMOL/L (ref 3.5–5.2)
PROT SERPL-MCNC: 6.6 G/DL (ref 6–8.5)
RBC # BLD AUTO: 4.6 10*6/MM3 (ref 3.77–5.28)
SODIUM SERPL-SCNC: 133 MMOL/L (ref 136–145)
T3FREE SERPL-MCNC: 2.9 PG/ML (ref 2–4.4)
T4 FREE SERPL-MCNC: 1.27 NG/DL (ref 0.93–1.7)
TRIGL SERPL-MCNC: 144 MG/DL (ref 0–149)
TSH SERPL DL<=0.05 MIU/L-ACNC: 3.49 UIU/ML (ref 0.27–4.2)
WBC # BLD AUTO: 7.01 10*3/MM3 (ref 3.4–10.8)

## 2020-01-01 PROCEDURE — 80053 COMPREHEN METABOLIC PANEL: CPT

## 2020-01-01 PROCEDURE — 82306 VITAMIN D 25 HYDROXY: CPT

## 2020-01-01 PROCEDURE — 83036 HEMOGLOBIN GLYCOSYLATED A1C: CPT

## 2020-01-01 PROCEDURE — 83704 LIPOPROTEIN BLD QUAN PART: CPT

## 2020-01-01 PROCEDURE — 99214 OFFICE O/P EST MOD 30 MIN: CPT | Performed by: INTERNAL MEDICINE

## 2020-01-01 PROCEDURE — 93280 PM DEVICE PROGR EVAL DUAL: CPT | Performed by: INTERNAL MEDICINE

## 2020-01-01 PROCEDURE — 93000 ELECTROCARDIOGRAM COMPLETE: CPT | Performed by: NURSE PRACTITIONER

## 2020-01-01 PROCEDURE — 80061 LIPID PANEL: CPT

## 2020-01-01 PROCEDURE — 84439 ASSAY OF FREE THYROXINE: CPT

## 2020-01-01 PROCEDURE — G0439 PPPS, SUBSEQ VISIT: HCPCS | Performed by: INTERNAL MEDICINE

## 2020-01-01 PROCEDURE — 85027 COMPLETE CBC AUTOMATED: CPT

## 2020-01-01 PROCEDURE — 84443 ASSAY THYROID STIM HORMONE: CPT

## 2020-01-01 PROCEDURE — 36415 COLL VENOUS BLD VENIPUNCTURE: CPT

## 2020-01-01 PROCEDURE — 99213 OFFICE O/P EST LOW 20 MIN: CPT | Performed by: NURSE PRACTITIONER

## 2020-01-01 PROCEDURE — 84481 FREE ASSAY (FT-3): CPT

## 2020-01-01 RX ORDER — APIXABAN 2.5 MG/1
TABLET, FILM COATED ORAL
Qty: 60 TABLET | Refills: 0 | Status: SHIPPED | OUTPATIENT
Start: 2020-01-01 | End: 2021-01-01

## 2020-01-01 RX ORDER — METOPROLOL SUCCINATE 50 MG/1
TABLET, EXTENDED RELEASE ORAL
Qty: 90 TABLET | Refills: 2 | Status: SHIPPED | OUTPATIENT
Start: 2020-01-01 | End: 2021-01-01

## 2020-01-01 RX ORDER — APIXABAN 2.5 MG/1
TABLET, FILM COATED ORAL
Qty: 60 TABLET | Refills: 0 | Status: SHIPPED | OUTPATIENT
Start: 2020-01-01 | End: 2020-01-01

## 2020-01-01 RX ORDER — CLOTRIMAZOLE AND BETAMETHASONE DIPROPIONATE 10; .64 MG/G; MG/G
CREAM TOPICAL
Qty: 45 G | Refills: 2 | Status: SHIPPED | OUTPATIENT
Start: 2020-01-01

## 2020-01-01 RX ORDER — APIXABAN 2.5 MG/1
TABLET, FILM COATED ORAL
Qty: 60 TABLET | Refills: 1 | Status: SHIPPED | OUTPATIENT
Start: 2020-01-01 | End: 2020-01-01

## 2020-01-08 DIAGNOSIS — Z51.81 THERAPEUTIC DRUG MONITORING: ICD-10-CM

## 2020-01-08 DIAGNOSIS — E78.5 HYPERLIPIDEMIA, UNSPECIFIED HYPERLIPIDEMIA TYPE: Chronic | ICD-10-CM

## 2020-01-08 DIAGNOSIS — E55.9 VITAMIN D DEFICIENCY: Chronic | ICD-10-CM

## 2020-01-08 DIAGNOSIS — R73.01 IMPAIRED FASTING GLUCOSE: Chronic | ICD-10-CM

## 2020-01-08 DIAGNOSIS — E03.8 SUBCLINICAL HYPOTHYROIDISM: Chronic | ICD-10-CM

## 2020-01-10 LAB
25(OH)D3+25(OH)D2 SERPL-MCNC: 46.5 NG/ML (ref 30–100)
ALBUMIN SERPL-MCNC: 4.3 G/DL (ref 3.5–5.2)
ALBUMIN/GLOB SERPL: 2 G/DL
ALP SERPL-CCNC: 75 U/L (ref 39–117)
ALT SERPL-CCNC: 10 U/L (ref 1–33)
AST SERPL-CCNC: 13 U/L (ref 1–32)
BILIRUB SERPL-MCNC: 0.4 MG/DL (ref 0.2–1.2)
BUN SERPL-MCNC: 22 MG/DL (ref 8–23)
BUN/CREAT SERPL: 27.2 (ref 7–25)
CALCIUM SERPL-MCNC: 9.4 MG/DL (ref 8.2–9.6)
CHLORIDE SERPL-SCNC: 95 MMOL/L (ref 98–107)
CHOLEST SERPL-MCNC: 250 MG/DL (ref 100–199)
CO2 SERPL-SCNC: 30 MMOL/L (ref 22–29)
CREAT SERPL-MCNC: 0.81 MG/DL (ref 0.57–1)
ERYTHROCYTE [DISTWIDTH] IN BLOOD BY AUTOMATED COUNT: 13.6 % (ref 12.3–15.4)
GLOBULIN SER CALC-MCNC: 2.2 GM/DL
GLUCOSE SERPL-MCNC: 102 MG/DL (ref 65–99)
HBA1C MFR BLD: 6.2 % (ref 4.8–5.6)
HCT VFR BLD AUTO: 38.2 % (ref 34–46.6)
HDL SERPL-SCNC: 36.7 UMOL/L
HDLC SERPL-MCNC: 61 MG/DL
HGB BLD-MCNC: 12.8 G/DL (ref 12–15.9)
LDL SERPL QN: 21.5 NM
LDL SERPL-SCNC: 1477 NMOL/L
LDL SMALL SERPL-SCNC: 408 NMOL/L
LDLC SERPL CALC-MCNC: 155 MG/DL (ref 0–99)
MCH RBC QN AUTO: 28.7 PG (ref 26.6–33)
MCHC RBC AUTO-ENTMCNC: 33.5 G/DL (ref 31.5–35.7)
MCV RBC AUTO: 85.7 FL (ref 79–97)
PLATELET # BLD AUTO: 181 10*3/MM3 (ref 140–450)
POTASSIUM SERPL-SCNC: 4 MMOL/L (ref 3.5–5.2)
PROT SERPL-MCNC: 6.5 G/DL (ref 6–8.5)
RBC # BLD AUTO: 4.46 10*6/MM3 (ref 3.77–5.28)
SODIUM SERPL-SCNC: 136 MMOL/L (ref 136–145)
T3FREE SERPL-MCNC: 2.7 PG/ML (ref 2–4.4)
T4 FREE SERPL-MCNC: 1.23 NG/DL (ref 0.93–1.7)
TRIGL SERPL-MCNC: 171 MG/DL (ref 0–149)
TSH SERPL DL<=0.005 MIU/L-ACNC: 3.33 UIU/ML (ref 0.27–4.2)
WBC # BLD AUTO: 5.83 10*3/MM3 (ref 3.4–10.8)

## 2020-01-17 ENCOUNTER — OFFICE VISIT (OUTPATIENT)
Dept: INTERNAL MEDICINE | Facility: CLINIC | Age: 85
End: 2020-01-17

## 2020-01-17 VITALS
WEIGHT: 115 LBS | OXYGEN SATURATION: 98 % | BODY MASS INDEX: 22.58 KG/M2 | HEART RATE: 80 BPM | HEIGHT: 60 IN | SYSTOLIC BLOOD PRESSURE: 128 MMHG | DIASTOLIC BLOOD PRESSURE: 60 MMHG

## 2020-01-17 DIAGNOSIS — M85.89 OSTEOPENIA OF MULTIPLE SITES: Chronic | ICD-10-CM

## 2020-01-17 DIAGNOSIS — E55.9 VITAMIN D DEFICIENCY: Chronic | ICD-10-CM

## 2020-01-17 DIAGNOSIS — I25.10 OCCLUSIVE CORONARY ARTERY DISEASE: Chronic | ICD-10-CM

## 2020-01-17 DIAGNOSIS — Z51.81 THERAPEUTIC DRUG MONITORING: ICD-10-CM

## 2020-01-17 DIAGNOSIS — M17.0 PRIMARY OSTEOARTHRITIS OF BOTH KNEES: Chronic | ICD-10-CM

## 2020-01-17 DIAGNOSIS — E78.2 MIXED HYPERLIPIDEMIA: Primary | Chronic | ICD-10-CM

## 2020-01-17 DIAGNOSIS — I34.0 MODERATE MITRAL REGURGITATION: Chronic | ICD-10-CM

## 2020-01-17 DIAGNOSIS — R73.01 IMPAIRED FASTING GLUCOSE: Chronic | ICD-10-CM

## 2020-01-17 DIAGNOSIS — Z86.79 HISTORY OF ATRIAL FLUTTER: Chronic | ICD-10-CM

## 2020-01-17 DIAGNOSIS — Z78.9 STATIN INTOLERANCE: Chronic | ICD-10-CM

## 2020-01-17 DIAGNOSIS — I10 BENIGN ESSENTIAL HYPERTENSION: Chronic | ICD-10-CM

## 2020-01-17 DIAGNOSIS — E03.8 SUBCLINICAL HYPOTHYROIDISM: Chronic | ICD-10-CM

## 2020-01-17 DIAGNOSIS — Z95.0 HISTORY OF PERMANENT CARDIAC PACEMAKER PLACEMENT: Chronic | ICD-10-CM

## 2020-01-17 PROCEDURE — 99214 OFFICE O/P EST MOD 30 MIN: CPT | Performed by: INTERNAL MEDICINE

## 2020-01-17 NOTE — PROGRESS NOTES
01/17/2020    Patient Information  Lora Serrano                                                                                          [id number] [facility] [address]      7/15/1926  [unfilled]  There is no work phone number on file.    Chief Complaint:     Follow-up hyperlipidemia, impaired fasting glucose, subclinical hypothyroidism, coronary artery disease, osteopenia, hypertension, vitamin D deficiency, history of permanent cardiac pacemaker, statin intolerance, history of atrial flutter and moderate mitral regurgitation, osteoarthritis of the knees.  No new acute complaints.    History of Present Illness:    Patient with a history of medical problems as outlined in the chief complaint presents today with follow-up lab in order to monitor her chronic medical issues.  Her past medical history reviewed and updated were necessary including health maintenance parameters.  This reveals she is up-to-date or else accounted for.    Review of Systems   Constitution: Negative.   HENT: Negative.    Eyes: Negative.    Cardiovascular: Negative.    Respiratory: Negative.    Endocrine: Negative.    Hematologic/Lymphatic: Negative.    Skin: Negative.    Musculoskeletal: Positive for arthritis and joint pain.   Gastrointestinal: Negative.    Genitourinary: Negative.    Neurological: Negative.    Psychiatric/Behavioral: Negative.    Allergic/Immunologic: Negative.        Active Problems:    Patient Active Problem List   Diagnosis   • Occlusive coronary artery disease, 12/11/2000--status post 5 vessel CABG   • Subclinical hypothyroidism   • Impaired fasting glucose   • Onychomycosis of fingernails   • Osteopenia of multiple sites   • Vitamin D deficiency   • Hyperlipidemia   • Benign essential hypertension   • Generalized osteoarthritis of multiple sites   • Therapeutic drug monitoring   • History of atrial flutter, 2003--successful catheter ablation for persistent atrial flutter.   • Moderate mitral  regurgitation, 02/18/2012--moderate MR, EF 58%.   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 2006--PM replacement.  Medtronic EnRhythm [id number]. 11/30/2015--PM generator change.   • Primary osteoarthritis of both knees   • Statin intolerance   • Mitral valve annular calcification         Past Medical History:   Diagnosis Date   • Benign essential hypertension 3/14/2016   • Generalized osteoarthritis of multiple sites 3/14/2016   • History of atrial flutter, 2003--successful catheter ablation for persistent atrial flutter. 2003 2003--catheter ablation for persistent atrial flutter was successful.   • History of complete AV block, 10/26/2006--permanent pacemaker replacement.  12/18/2000--dual-chamber pacemaker placement for sick sinus syndrome, atrial flutter, AV block after CABG. 12/18/2000 12/18/2000--dual-chamber pacemaker placement for sick sinus syndrome, atrial flutter, AV block that occurred after her bypass surgery. 10/26/2006--status post pacemaker replacement. Medtronic EnRhythm [id number]   • History of drug rash, 01/11/2014--name brand Synthroid 01/11/2014 01/11/2014--patient presented with a diffuse erythematous rash that was very pruritic. Examination consistent with a drug reaction/allergic dermatitis. Believed to be secondary to name brand Synthroid. This was discontinued and patient treated with a Medrol Dosepak.   • History of osteoporosis, June 2001 osteoporosis of the LS spine.  2011--improved to osteopenia only. 2011,2001 01/31/2011--DEXA scan  revealed LS spine T score -2.2, left hip -1.0.  Now osteopenia.   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 2006--PM replacement.  Medtronic EnRhythm [id number]. 11/30/2015--PM generator change. 11/30/2015 11/30/2015--pacemaker generator change.  10/26/2006--status post pacemaker replacement.  Medtronic EnRhythm [id number]  12/18/2000--dual-chamber pacemaker placement for sick sinus syndrome, atrial flutter, AV block that occurred after her bypass surgery.   2000--dual-chamber PM for SSS, A flutter, AVB after CABG. 2006--PM replacement.  Medtronic EnRhythm [id number]. 11/30/2015--PM generator change.   • Hyperlipidemia 3/14/2016   • Impaired fasting glucose 3/16/2016    03/16/2015--type 2 diabetes is now evolved into impaired fasting glucose.  Hemoglobin A1c 5.7.  Diagnosed January 2010   • Mitral valve annular calcification 7/16/2019    January 15, 2019--CT scan of the chest without contrast reveals nodular calcification within the mitral valve annulus that accounts for a nodular opacity in the left lower lung zone shown on the patient's recent chest x-ray.  No pulmonary nodules or infiltrates are currently identified.  Mild cardiomegaly.  Small fixed hiatal hernia.  01/15/2019--patient seen in follow-up and reports her lower res   • Moderate mitral regurgitation, 02/18/2012--moderate MR, EF 58%. 1/18/2012 01/18/2012 revealed ejection fraction of 58%, moderate mitral regurgitation.   • Occlusive coronary artery disease, 12/11/2000--status post 5 vessel CABG 12/11/2000 12/11/2000--status post 5 vessel CABG.  Patient subsequently had chest pain from her sternal wires which were removed.   • Onychomycosis of fingernails 3/14/2016    Patient has had onychomycosis involving her fingernails for several years.  She tries to control it with Ertaczo cream.   • Osteopenia of multiple sites 6/1/2001 01/31/2011--DEXA scan  revealed LS spine T score -2.2, left hip -1.0.  Now osteopenia.   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   • Primary osteoarthritis of both knees 9/28/2016 09/28/2016--patient seen in follow-up and continues to have bilateral knee discomfort particularly with prolonged use.  Right knee is worse than the left.  She understandably does not want have a knee replacement but I think  she would benefit from orthopedic evaluation for consideration of cortisone or visco supplementation.  Patient referred to Dr. Wallace.  08/26/2016--x-ray of the right knee reveals very severe degenerative changes involving the medial compartment with marked joint space narrowing and subchondral sclerosis and marginal spurring.  No joint effusion and no acute pathology.   • Statin intolerance 3/28/2017    Intolerance to multiple statins.   • Status post catheter ablation of atrial flutter, 2002--successful catheter ablation for persistent atrial flutter. 2003 2003--catheter ablation for persistent atrial flutter was successful.   • Subclinical hypothyroidism 5/9/2013    Diagnosed 05/16/2013.   05/09/2013--ultrasound thyroid for new hypothyroidism revealed a small thyroid gland consistent with hypothyroidism.  Mixed nodule present.   • Vitamin D deficiency 3/14/2016         Past Surgical History:   Procedure Laterality Date   • CARDIAC ABLATION  07/02/2003 07/02/2003--catheter ablation for persistent atrial flutter was successful.   • CARDIAC PACEMAKER PLACEMENT  12/18/2000 12/18/2000--dual-chamber pacemaker placement for sick sinus syndrome, atrial flutter, AV block that occurred after her bypass surgery.   • CARPAL TUNNEL RELEASE Right 01/2010 January 2010--status post right carpal tunnel release. No surgical treatment on left.   • CORONARY ARTERY BYPASS GRAFT  12/2000 December 2000--coronary artery bypass x 5   • HX OVARIAN CYSTECTOMY  Remote    Remote ovarian cyst excision.   • INCISIONAL BREAST BIOPSY  Remote    Benign   • PACEMAKER REPLACEMENT  10/26/2006    10/26/2006--status post pacemaker replacement. Medtronic EnRhythm [id number]    • PACEMAKER REPLACEMENT  11/30/2015 11/30/2015--pacemaker generator change.         Allergies   Allergen Reactions   • Atorvastatin    • Levothyroxine Sodium    • Other      Seafood   • Penicillins            Current Outpatient Medications:   •  Cholecalciferol  (VITAMIN D) 1000 UNITS tablet, Take 1 tablet by mouth daily., Disp: , Rfl:   •  clotrimazole-betamethasone (LOTRISONE) 1-0.05 % cream, APPLY SPARINGLY TO AFFECTED AREA(S) 3 TIMES A DAY, Disp: 45 g, Rfl: 2  •  Coenzyme Q10 (COQ10) 400 MG capsule, Take 1 capsule by mouth daily. With food, Disp: , Rfl:   •  doxazosin (CARDURA) 2 MG tablet, Take 1 p.o. daily for high blood pressure, Disp: 90 tablet, Rfl: 3  •  ketoconazole (NIZORAL) 2 % cream, Apply  topically to the appropriate area as directed Every 12 (Twelve) Hours., Disp: 15 g, Rfl: 0  •  metoprolol succinate XL (TOPROL-XL) 50 MG 24 hr tablet, Take 1 p.o. every morning for high blood pressure and heart, Disp: 90 tablet, Rfl: 3  •  metroNIDAZOLE (METROGEL) 0.75 % gel, , Disp: , Rfl:   •  Red Yeast Rice 600 MG tablet, Take 2 tablets by mouth every day., Disp: , Rfl:   •  valsartan-hydrochlorothiazide (DIOVAN HCT) 160-12.5 MG per tablet, Take 1 p.o. every morning for high blood pressure, Disp: 90 tablet, Rfl: 3      Family History   Problem Relation Age of Onset   • Hyperlipidemia Mother    • Breast cancer Sister    • Diabetes type II Sister          Social History     Socioeconomic History   • Marital status:      Spouse name: Not on file   • Number of children: 2   • Years of education: Not on file   • Highest education level: 12th grade   Occupational History   • Occupation: Retired   Social Needs   • Financial resource strain: Not hard at all   • Food insecurity:     Worry: Never true     Inability: Never true   • Transportation needs:     Medical: No     Non-medical: No   Tobacco Use   • Smoking status: Never Smoker   • Smokeless tobacco: Never Used   Substance and Sexual Activity   • Alcohol use: No     Frequency: Never     Binge frequency: Never   • Drug use: No   • Sexual activity: Not Currently     Partners: Male   Lifestyle   • Physical activity:     Days per week: 0 days     Minutes per session: 0 min   • Stress: Not at all   Relationships   • Social  "connections:     Talks on phone: More than three times a week     Gets together: Twice a week     Attends Bahai service: More than 4 times per year     Active member of club or organization: No     Attends meetings of clubs or organizations: Never     Relationship status:          Vitals:    01/17/20 0730   BP: 128/60   BP Location: Left arm   Pulse: 80   SpO2: 98%   Weight: 52.2 kg (115 lb)   Height: 152.4 cm (60\")        Body mass index is 22.46 kg/m².      Physical Exam:    General: Alert and oriented x 3.  No acute distress.  Normal affect.  HEENT: Pupils equal, round, reactive to light; extraocular movements intact; sclerae nonicteric; pharynx, ear canals and TMs normal.  Neck: Without JVD, thyromegaly, bruit, or adenopathy.  Lungs: Clear to auscultation in all fields.  Heart: Regular rate and rhythm without murmur, rub, gallop, or click.  Abdomen: Soft, nontender, without hepatosplenomegaly or hernia.  Bowel sounds normal.  : Deferred.  Rectal: Deferred.  Extremities: Without clubbing, cyanosis, edema, or pulse deficit.  Neurologic: Intact without focal deficit.  Patient ambulates with the assistance of a wheeled Rollator.  Skin: Without significant lesion.  Musculoskeletal: Unremarkable.    Lab/other results:    NMR reveals a total cholesterol 250.  Triglycerides 171.  LDL particle number is borderline at 1477.  Small LDL particle number is excellent at 408.  HDL particle number normal at 36.7.  CMP normal except glucose 102.  CBC is normal.  Hemoglobin A1c 6.2.  Thyroid function tests are normal.  Vitamin D normal.    Assessment/Plan:     Diagnosis Plan   1. Hyperlipidemia  NMR LipoProfile   2. Impaired fasting glucose  Comprehensive Metabolic Panel    Hemoglobin A1c   3. Subclinical hypothyroidism  TSH    T4, Free    T3, Free   4. Occlusive coronary artery disease, 12/11/2000--status post 5 vessel CABG     5. Osteopenia of multiple sites     6. Benign essential hypertension     7. Vitamin D " deficiency  Vitamin D 25 Hydroxy   8. History of permanent cardiac PM, 2000--dual-chamber PM for SSS, A flutter, AVB after CABG. 2006--PM replacement.  Medtronic EnRhythm [id number]. 11/30/2015--PM generator change.     9. Statin intolerance     10. History of atrial flutter, 2003--successful catheter ablation for persistent atrial flutter.     11. Moderate mitral regurgitation, 02/18/2012--moderate MR, EF 58%.     12. Primary osteoarthritis of both knees     13. Therapeutic drug monitoring  CBC (No Diff)     Patient has hyperlipidemia which is under as good control as we can get it on red yeast rice.  Patient is statin intolerant.  She has very mild impaired fasting glucose that does not require medication.  Her thyroid is in the normal range and the diagnosis of subclinical hypothyroidism is somewhat suspect.  We will continue to monitor.  Patient has occlusive coronary artery disease and had a 5 vessel CABG back in 2000 and has been stable.  She has osteopenia and we have elected not to pursue any more DEXA scan screenings given her age and overall clinical picture.  She has a permanent cardiac pacemaker in place and is followed by the cardiologist.  As mentioned, she is statin intolerant.  She had a successful catheter ablation of atrial flutter back in 2003 and she also has moderate mitral regurgitation.  Once again, these are followed by the cardiologist.  Her primary osteoarthritis, particularly the knees is tolerable.    Plan is as follows: No change in current medical regimen.  Patient will follow-up after July 16, 2020 with lab prior and this will also be subsequent Medicare wellness visit.        Procedures

## 2020-02-04 ENCOUNTER — OFFICE VISIT (OUTPATIENT)
Dept: INTERNAL MEDICINE | Facility: CLINIC | Age: 85
End: 2020-02-04

## 2020-02-04 VITALS
DIASTOLIC BLOOD PRESSURE: 60 MMHG | HEART RATE: 87 BPM | SYSTOLIC BLOOD PRESSURE: 104 MMHG | BODY MASS INDEX: 22.46 KG/M2 | HEIGHT: 60 IN | TEMPERATURE: 98.4 F | OXYGEN SATURATION: 95 %

## 2020-02-04 DIAGNOSIS — R68.89 FLU-LIKE SYMPTOMS: ICD-10-CM

## 2020-02-04 DIAGNOSIS — Z20.828 EXPOSURE TO INFLUENZA: Primary | ICD-10-CM

## 2020-02-04 DIAGNOSIS — R05.8 DRY COUGH: ICD-10-CM

## 2020-02-04 PROCEDURE — 99213 OFFICE O/P EST LOW 20 MIN: CPT | Performed by: INTERNAL MEDICINE

## 2020-02-04 RX ORDER — OSELTAMIVIR PHOSPHATE 75 MG/1
CAPSULE ORAL
Qty: 10 CAPSULE | Refills: 0 | Status: SHIPPED | OUTPATIENT
Start: 2020-02-04 | End: 2020-07-05

## 2020-02-04 NOTE — PROGRESS NOTES
02/04/2020    Patient Information  Lora Serrano                                                                                          [id number] [facility] [address]      7/15/1926  [unfilled]  There is no work phone number on file.    Chief Complaint:     Complaining of dry cough and feeling poorly.  Recent exposure to influenza.    History of Present Illness:    Patient with history of impaired fasting glucose, subclinical hypothyroidism, hyperlipidemia, hypertension, history of atrial flutter and history of permanent cardiac pacemaker placement.  She presents today after being exposed to the flu and has complaints of a dry cough as well as pleuritic chest discomfort when she coughs.    History regarding recent influenza exposure and dry cough/flulike symptoms:    February 4, 2020--patient presents with approximately 2-day history of numbness that in some ways are flulike although she has not had any fever shaking chills.  Her grandson was recently diagnosed with strep throat and influenza this past Sunday.  Patient presents with complaints of dry cough, discomfort in her chest when she coughs, somewhat achiness and overall just does not feel well.  She has had her flu shot but her grandson did as well.  On exam today she is afebrile and her vital signs are stable and comparable to previous.  She has very mild scattered rhonchi and end expiratory wheeze present but no signs of consolidation.  I did not test her for influenza because I think the best course of action is to go ahead and treat her for influenza exposure and also treat her symptomatically with Tussionex cough syrup.  Patient instructed to see me if her symptoms do not resolve and certainly if they worsen in any way.    Review of Systems   Constitution: Negative. Negative for chills and fever.   HENT: Negative.    Eyes: Negative.    Cardiovascular: Negative.    Respiratory: Positive for cough and wheezing. Negative for shortness  of breath and sputum production.    Endocrine: Negative.    Hematologic/Lymphatic: Negative.    Skin: Negative.    Musculoskeletal: Negative.    Gastrointestinal: Negative.    Genitourinary: Negative.    Neurological: Negative.    Psychiatric/Behavioral: Negative.    Allergic/Immunologic: Negative.        Active Problems:    Patient Active Problem List   Diagnosis   • Occlusive coronary artery disease, 12/11/2000--status post 5 vessel CABG   • Subclinical hypothyroidism   • Impaired fasting glucose   • Onychomycosis of fingernails   • Osteopenia of multiple sites   • Vitamin D deficiency   • Hyperlipidemia   • Benign essential hypertension   • Generalized osteoarthritis of multiple sites   • Therapeutic drug monitoring   • History of atrial flutter, 2003--successful catheter ablation for persistent atrial flutter.   • Moderate mitral regurgitation, 02/18/2012--moderate MR, EF 58%.   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 2006--PM replacement.  Medtronic EnRhythm [id number]. 11/30/2015--PM generator change.   • Primary osteoarthritis of both knees   • Statin intolerance   • Mitral valve annular calcification   • Exposure to influenza   • Flu-like symptoms   • Dry cough         Past Medical History:   Diagnosis Date   • Benign essential hypertension 3/14/2016   • Generalized osteoarthritis of multiple sites 3/14/2016   • History of atrial flutter, 2003--successful catheter ablation for persistent atrial flutter. 2003 2003--catheter ablation for persistent atrial flutter was successful.   • History of complete AV block, 10/26/2006--permanent pacemaker replacement.  12/18/2000--dual-chamber pacemaker placement for sick sinus syndrome, atrial flutter, AV block after CABG. 12/18/2000 12/18/2000--dual-chamber pacemaker placement for sick sinus syndrome, atrial flutter, AV block that occurred after her bypass surgery. 10/26/2006--status post pacemaker replacement.  Medtronic EnRhythm [id number]   • History of drug rash, 01/11/2014--name brand Synthroid 01/11/2014 01/11/2014--patient presented with a diffuse erythematous rash that was very pruritic. Examination consistent with a drug reaction/allergic dermatitis. Believed to be secondary to name brand Synthroid. This was discontinued and patient treated with a Medrol Dosepak.   • History of osteoporosis, June 2001 osteoporosis of the LS spine.  2011--improved to osteopenia only. 2011,2001 01/31/2011--DEXA scan  revealed LS spine T score -2.2, left hip -1.0.  Now osteopenia.   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 2006--PM replacement.  Medtronic EnRhythm [id number]. 11/30/2015--PM generator change. 11/30/2015 11/30/2015--pacemaker generator change.  10/26/2006--status post pacemaker replacement. Medtronic EnRhythm [id number]  12/18/2000--dual-chamber pacemaker placement for sick sinus syndrome, atrial flutter, AV block that occurred after her bypass surgery.   2000--dual-chamber PM for SSS, A flutter, AVB after CABG. 2006--PM replacement.  Medtronic EnRhythm [id number]. 11/30/2015--PM generator change.   • Hyperlipidemia 3/14/2016   • Impaired fasting glucose 3/16/2016    03/16/2015--type 2 diabetes is now evolved into impaired fasting glucose.  Hemoglobin A1c 5.7.  Diagnosed January 2010   • Mitral valve annular calcification 7/16/2019    January 15, 2019--CT scan of the chest without contrast reveals nodular calcification within the mitral valve annulus that accounts for a nodular opacity in the left lower lung zone shown on the patient's recent chest x-ray.  No pulmonary nodules or infiltrates are currently identified.  Mild cardiomegaly.  Small fixed hiatal hernia.  01/15/2019--patient seen in follow-up and reports her lower res   • Moderate mitral regurgitation, 02/18/2012--moderate MR, EF 58%. 1/18/2012 01/18/2012  revealed ejection fraction of 58%, moderate mitral regurgitation.   • Occlusive coronary artery disease, 12/11/2000--status post 5 vessel CABG 12/11/2000 12/11/2000--status post 5 vessel CABG.  Patient subsequently had chest pain from her sternal wires which were removed.   • Onychomycosis of fingernails 3/14/2016    Patient has had onychomycosis involving her fingernails for several years.  She tries to control it with Ertaczo cream.   • Osteopenia of multiple sites 6/1/2001 01/31/2011--DEXA scan  revealed LS spine T score -2.2, left hip -1.0.  Now osteopenia.   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   • Primary osteoarthritis of both knees 9/28/2016 09/28/2016--patient seen in follow-up and continues to have bilateral knee discomfort particularly with prolonged use.  Right knee is worse than the left.  She understandably does not want have a knee replacement but I think she would benefit from orthopedic evaluation for consideration of cortisone or visco supplementation.  Patient referred to Dr. Wallace.  08/26/2016--x-ray of the right knee reveals very severe degenerative changes involving the medial compartment with marked joint space narrowing and subchondral sclerosis and marginal spurring.  No joint effusion and no acute pathology.   • Statin intolerance 3/28/2017    Intolerance to multiple statins.   • Status post catheter ablation of atrial flutter, 2002--successful catheter ablation for persistent atrial flutter. 2003 2003--catheter ablation for persistent atrial flutter was successful.   • Subclinical hypothyroidism 5/9/2013    Diagnosed 05/16/2013.   05/09/2013--ultrasound thyroid for new hypothyroidism revealed a small thyroid gland consistent with hypothyroidism.  Mixed nodule present.   • Vitamin D deficiency 3/14/2016         Past Surgical History:   Procedure Laterality Date   • CARDIAC ABLATION  07/02/2003 07/02/2003--catheter ablation for persistent atrial flutter was  successful.   • CARDIAC PACEMAKER PLACEMENT  12/18/2000 12/18/2000--dual-chamber pacemaker placement for sick sinus syndrome, atrial flutter, AV block that occurred after her bypass surgery.   • CARPAL TUNNEL RELEASE Right 01/2010 January 2010--status post right carpal tunnel release. No surgical treatment on left.   • CORONARY ARTERY BYPASS GRAFT  12/2000 December 2000--coronary artery bypass x 5   • HX OVARIAN CYSTECTOMY  Remote    Remote ovarian cyst excision.   • INCISIONAL BREAST BIOPSY  Remote    Benign   • PACEMAKER REPLACEMENT  10/26/2006    10/26/2006--status post pacemaker replacement. Medtronic EnRhythm [id number]    • PACEMAKER REPLACEMENT  11/30/2015 11/30/2015--pacemaker generator change.         Allergies   Allergen Reactions   • Atorvastatin    • Levothyroxine Sodium    • Other      Seafood   • Penicillins            Current Outpatient Medications:   •  Cholecalciferol (VITAMIN D) 1000 UNITS tablet, Take 1 tablet by mouth daily., Disp: , Rfl:   •  clotrimazole-betamethasone (LOTRISONE) 1-0.05 % cream, APPLY SPARINGLY TO AFFECTED AREA(S) 3 TIMES A DAY, Disp: 45 g, Rfl: 2  •  Coenzyme Q10 (COQ10) 400 MG capsule, Take 1 capsule by mouth daily. With food, Disp: , Rfl:   •  doxazosin (CARDURA) 2 MG tablet, Take 1 p.o. daily for high blood pressure, Disp: 90 tablet, Rfl: 3  •  ketoconazole (NIZORAL) 2 % cream, Apply  topically to the appropriate area as directed Every 12 (Twelve) Hours., Disp: 15 g, Rfl: 0  •  metoprolol succinate XL (TOPROL-XL) 50 MG 24 hr tablet, Take 1 p.o. every morning for high blood pressure and heart, Disp: 90 tablet, Rfl: 3  •  metroNIDAZOLE (METROGEL) 0.75 % gel, , Disp: , Rfl:   •  Red Yeast Rice 600 MG tablet, Take 2 tablets by mouth every day., Disp: , Rfl:   •  valsartan-hydrochlorothiazide (DIOVAN HCT) 160-12.5 MG per tablet, Take 1 p.o. every morning for high blood pressure, Disp: 90 tablet, Rfl: 3  •  HYDROcod Polst-CPM Polst ER (TUSSIONEX PENNKINETIC ER) 10-8  "MG/5ML ER suspension, Take 1 teaspoon p.o. every 12 hours as needed for cough and congestion, Disp: 115 mL, Rfl: 0  •  oseltamivir (TAMIFLU) 75 MG capsule, Take 1 p.o. twice daily until gone, Disp: 10 capsule, Rfl: 0      Family History   Problem Relation Age of Onset   • Hyperlipidemia Mother    • Breast cancer Sister    • Diabetes type II Sister          Social History     Socioeconomic History   • Marital status:      Spouse name: Not on file   • Number of children: 2   • Years of education: Not on file   • Highest education level: 12th grade   Occupational History   • Occupation: Retired   Social Needs   • Financial resource strain: Not hard at all   • Food insecurity:     Worry: Never true     Inability: Never true   • Transportation needs:     Medical: No     Non-medical: No   Tobacco Use   • Smoking status: Never Smoker   • Smokeless tobacco: Never Used   Substance and Sexual Activity   • Alcohol use: No     Frequency: Never     Binge frequency: Never   • Drug use: No   • Sexual activity: Not Currently     Partners: Male   Lifestyle   • Physical activity:     Days per week: 0 days     Minutes per session: 0 min   • Stress: Not at all   Relationships   • Social connections:     Talks on phone: More than three times a week     Gets together: Twice a week     Attends [employer]regation service: More than 4 times per year     Active member of club or organization: No     Attends meetings of clubs or organizations: Never     Relationship status:          Vitals:    02/04/20 1046   BP: 104/60   BP Location: Left arm   Pulse: 87   Temp: 98.4 °F (36.9 °C)   SpO2: 95%   Height: 152.4 cm (60\")        Body mass index is 22.46 kg/m².      Physical Exam:    General: Alert and oriented x 3.  No acute distress.  Normal affect.  HEENT: Pupils equal, round, reactive to light; extraocular movements intact; sclerae nonicteric; pharynx, ear canals and TMs normal.  Neck: Without JVD, thyromegaly, bruit, or adenopathy.  " Lungs: Mostly clear although there is occasional scattered rhonchi.  Heart: Regular rate and rhythm without murmur, rub, gallop, or click.  Abdomen: Soft, nontender, without hepatosplenomegaly or hernia.  Bowel sounds normal.  : Deferred.  Rectal: Deferred.  Extremities: Without clubbing, cyanosis, edema, or pulse deficit.  Neurologic: Intact without focal deficit.  Normal station and gait observed during ingress and egress from the examination room.  Skin: Without significant lesion.  Musculoskeletal: Unremarkable.    Lab/other results:      Assessment/Plan:     Diagnosis Plan   1. Exposure to influenza  oseltamivir (TAMIFLU) 75 MG capsule    HYDROcod Polst-CPM Polst ER (TUSSIONEX PENNKINETIC ER) 10-8 MG/5ML ER suspension   2. Flu-like symptoms  oseltamivir (TAMIFLU) 75 MG capsule    HYDROcod Polst-CPM Polst ER (TUSSIONEX PENNKINETIC ER) 10-8 MG/5ML ER suspension   3. Dry cough  oseltamivir (TAMIFLU) 75 MG capsule    HYDROcod Polst-CPM Polst ER (TUSSIONEX PENNKINETIC ER) 10-8 MG/5ML ER suspension     February 4, 2020--patient presents with approximately 2-day history of numbness that in some ways are flulike although she has not had any fever shaking chills.  Her grandson was recently diagnosed with strep throat and influenza this past Sunday.  Patient presents with complaints of dry cough, discomfort in her chest when she coughs, somewhat achiness and overall just does not feel well.  She has had her flu shot but her grandson did as well.  On exam today she is afebrile and her vital signs are stable and comparable to previous.  She has very mild scattered rhonchi and end expiratory wheeze present but no signs of consolidation.  I did not test her for influenza because I think the best course of action is to go ahead and treat her for influenza exposure and also treat her symptomatically with Tussionex cough syrup.  Patient instructed to see me if her symptoms do not resolve and certainly if they worsen in any  way.            Procedures

## 2020-02-26 ENCOUNTER — CLINICAL SUPPORT NO REQUIREMENTS (OUTPATIENT)
Dept: CARDIOLOGY | Facility: CLINIC | Age: 85
End: 2020-02-26

## 2020-02-26 ENCOUNTER — TELEPHONE (OUTPATIENT)
Dept: CARDIOLOGY | Facility: CLINIC | Age: 85
End: 2020-02-26

## 2020-02-26 DIAGNOSIS — I44.2 COMPLETE HEART BLOCK (HCC): Primary | ICD-10-CM

## 2020-02-26 PROCEDURE — 93280 PM DEVICE PROGR EVAL DUAL: CPT | Performed by: INTERNAL MEDICINE

## 2020-02-26 NOTE — TELEPHONE ENCOUNTER
"Pt has Medtronic Adapta for CHB programmed DDDR    Found on routine pacemaker check today:     Normal DDDR pacer function, currently in MS of DDIR. Presents Aflutter/ @76bpm. Underlying Afl with no intrinsic R at VVI 40 as per hx CHB. Apace 74.5%, Vpace 99.8%. No VHRs recorded. 53 AMS with 14 AHRs recorded. This is new for pt. She is not on AC. [place] is 6.9% however, current episode is ongoing since 2/17/20. First episode was 10/29/19. V rate has remained controlled due to CHB. Pt does report she had the flu early this month and has felt \"off\" since. Denies specific symptoms. No other events recorded. Lead testing wnl.   NS                    "

## 2020-02-26 NOTE — TELEPHONE ENCOUNTER
Reviewed with Dr. Fontanez ----tried to call patient to discuss, no answer, left message, will try to call again tomorrow but if she has not had any bleeding issues and does not seem to be falls risk we think she should be started on apixaban 2.5mg BID

## 2020-02-27 NOTE — TELEPHONE ENCOUNTER
Spoke with patient ragarding AF---she says she had an ablation the past and wanted to know if she was a candidate for another ablation---I told her not at this point---she had a flutter ablation in 2003---    We discussed anticoagulation---she said Dr. Mix had stopped her aspirin because she had some bruising but not bleeding or falls.     She is going to come  some samples of apixaban 2.5mg and try it---she has appt with Dr. Fontanez on 3/13 and will let him know how things are going at that time and they can decide whether she should continue it    She thinks this was triggered by meds for flu because she was exposed and did have a cough.

## 2020-04-15 ENCOUNTER — OFFICE VISIT (OUTPATIENT)
Dept: CARDIOLOGY | Facility: CLINIC | Age: 85
End: 2020-04-15

## 2020-04-15 VITALS — WEIGHT: 120 LBS | HEIGHT: 59 IN | BODY MASS INDEX: 24.19 KG/M2

## 2020-04-15 DIAGNOSIS — I10 BENIGN ESSENTIAL HYPERTENSION: Chronic | ICD-10-CM

## 2020-04-15 DIAGNOSIS — Z86.79 HISTORY OF ATRIAL FLUTTER: Chronic | ICD-10-CM

## 2020-04-15 DIAGNOSIS — I48.0 AF (PAROXYSMAL ATRIAL FIBRILLATION) (HCC): ICD-10-CM

## 2020-04-15 DIAGNOSIS — Z95.0 HISTORY OF PERMANENT CARDIAC PACEMAKER PLACEMENT: Primary | Chronic | ICD-10-CM

## 2020-04-15 DIAGNOSIS — I25.10 OCCLUSIVE CORONARY ARTERY DISEASE: Chronic | ICD-10-CM

## 2020-04-15 PROCEDURE — 99443 PR PHYS/QHP TELEPHONE EVALUATION 21-30 MIN: CPT | Performed by: INTERNAL MEDICINE

## 2020-04-15 NOTE — PROGRESS NOTES
Date of Office Visit: 04/15/2020  Encounter Provider: Antonio Fontanez MD  Place of Service: [facility] CARDIOLOGY  Patient Name: Lora Serrano  : 7/15/1926    Subjective:     Encounter Date:04/15/2020      This patient has consented to a telehealth visit via tele. The visit was scheduled as a tele visit to comply with patient safety concerns in accordance with CDC recommendations.  All vitals recorded within this visit are reported by the patient.  I spent  25 minutes in total including but not limited to the 22 minutes spent in direct conversation with this patient.         Patient ID: Lora Serrano is a 89 y.o. female who has a cc of   av block, CABG< HTN, and dual chamber pacer and moderate MR and here for FU.     Recently diag with AF on pacer tele.     She is taking low dose apixban for AF.       She complains mostly because of her knee pains.     The patient had a good year.   No anginal chest pain,   No sig moreno,   No soa,   No fainting,  No orthostasis--which is good because she is on bp meds.   No edema.   Exercise tolerance: very sedentary due to the knee pain.     There have been no hospital admission since the last visit.     There have been no bleeding events.       Past Medical History:   Diagnosis Date   • Benign essential hypertension 3/14/2016   • Generalized osteoarthritis of multiple sites 3/14/2016   • History of atrial flutter, --successful catheter ablation for persistent atrial flutter. 2003--catheter ablation for persistent atrial flutter was successful.   • History of complete AV block, 10/26/2006--permanent pacemaker replacement.  2000--dual-chamber pacemaker placement for sick sinus syndrome, atrial flutter, AV block after CABG. 2000--dual-chamber pacemaker placement for sick sinus syndrome, atrial flutter, AV block that occurred after her bypass surgery. 10/26/2006--status post pacemaker replacement. Medtronic EnRhythm  [id number]   • History of drug rash, 01/11/2014--name brand Synthroid 01/11/2014 01/11/2014--patient presented with a diffuse erythematous rash that was very pruritic. Examination consistent with a drug reaction/allergic dermatitis. Believed to be secondary to name brand Synthroid. This was discontinued and patient treated with a Medrol Dosepak.   • History of osteoporosis, June 2001 osteoporosis of the LS spine.  2011--improved to osteopenia only. 2011,2001 01/31/2011--DEXA scan  revealed LS spine T score -2.2, left hip -1.0.  Now osteopenia.   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 2006--PM replacement.  Medtronic EnRhythm [id number]. 11/30/2015--PM generator change. 11/30/2015 11/30/2015--pacemaker generator change.  10/26/2006--status post pacemaker replacement. Medtronic EnRhythm [id number]  12/18/2000--dual-chamber pacemaker placement for sick sinus syndrome, atrial flutter, AV block that occurred after her bypass surgery.   2000--dual-chamber PM for SSS, A flutter, AVB after CABG. 2006--PM replacement.  Medtronic EnRhythm [id number]. 11/30/2015--PM generator change.   • Hyperlipidemia 3/14/2016   • Impaired fasting glucose 3/16/2016    03/16/2015--type 2 diabetes is now evolved into impaired fasting glucose.  Hemoglobin A1c 5.7.  Diagnosed January 2010   • Mitral valve annular calcification 7/16/2019    January 15, 2019--CT scan of the chest without contrast reveals nodular calcification within the mitral valve annulus that accounts for a nodular opacity in the left lower lung zone shown on the patient's recent chest x-ray.  No pulmonary nodules or infiltrates are currently identified.  Mild cardiomegaly.  Small fixed hiatal hernia.  01/15/2019--patient seen in follow-up and reports her lower res   • Moderate mitral regurgitation, 02/18/2012--moderate MR, EF 58%. 1/18/2012 01/18/2012 revealed ejection  fraction of 58%, moderate mitral regurgitation.   • Occlusive coronary artery disease, 12/11/2000--status post 5 vessel CABG 12/11/2000 12/11/2000--status post 5 vessel CABG.  Patient subsequently had chest pain from her sternal wires which were removed.   • Onychomycosis of fingernails 3/14/2016    Patient has had onychomycosis involving her fingernails for several years.  She tries to control it with Ertaczo cream.   • Osteopenia of multiple sites 6/1/2001 01/31/2011--DEXA scan  revealed LS spine T score -2.2, left hip -1.0.  Now osteopenia.   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   • Primary osteoarthritis of both knees 9/28/2016 09/28/2016--patient seen in follow-up and continues to have bilateral knee discomfort particularly with prolonged use.  Right knee is worse than the left.  She understandably does not want have a knee replacement but I think she would benefit from orthopedic evaluation for consideration of cortisone or visco supplementation.  Patient referred to Dr. Wallace.  08/26/2016--x-ray of the right knee reveals very severe degenerative changes involving the medial compartment with marked joint space narrowing and subchondral sclerosis and marginal spurring.  No joint effusion and no acute pathology.   • Statin intolerance 3/28/2017    Intolerance to multiple statins.   • Status post catheter ablation of atrial flutter, 2002--successful catheter ablation for persistent atrial flutter. 2003 2003--catheter ablation for persistent atrial flutter was successful.   • Subclinical hypothyroidism 5/9/2013    Diagnosed 05/16/2013.   05/09/2013--ultrasound thyroid for new hypothyroidism revealed a small thyroid gland consistent with hypothyroidism.  Mixed nodule present.   • Vitamin D deficiency 3/14/2016       Social History     Socioeconomic History   • Marital status:      Spouse name: Not on file   • Number of children: 2   • Years of education: Not on file   • Highest  "education level: 12th grade   Occupational History   • Occupation: Retired   Social Needs   • Financial resource strain: Not hard at all   • Food insecurity:     Worry: Never true     Inability: Never true   • Transportation needs:     Medical: No     Non-medical: No   Tobacco Use   • Smoking status: Never Smoker   • Smokeless tobacco: Never Used   Substance and Sexual Activity   • Alcohol use: No     Frequency: Never     Binge frequency: Never   • Drug use: No   • Sexual activity: Not Currently     Partners: Male   Lifestyle   • Physical activity:     Days per week: 0 days     Minutes per session: 0 min   • Stress: Not at all   Relationships   • Social connections:     Talks on phone: More than three times a week     Gets together: Twice a week     Attends [employer] service: More than 4 times per year     Active member of club or organization: No     Attends meetings of clubs or organizations: Never     Relationship status:        Review of Systems   Constitution: Negative for fever and night sweats.   HENT: Negative for ear pain and stridor.    Eyes: Negative for discharge and visual halos.   Cardiovascular: Negative for cyanosis.   Respiratory: Negative for hemoptysis and sputum production.    Hematologic/Lymphatic: Negative for adenopathy.   Skin: Negative for nail changes and unusual hair distribution.   Musculoskeletal: Positive for arthritis and joint pain. Negative for gout and joint swelling.   Gastrointestinal: Negative for bowel incontinence and flatus.   Genitourinary: Negative for dysuria and flank pain.   Neurological: Negative for seizures and tremors.   Psychiatric/Behavioral: Negative for altered mental status. The patient is not nervous/anxious.             Objective:     Vitals:    04/15/20 1046   Weight: 54.4 kg (120 lb)   Height: 149.9 cm (59\")     Virtual       Lab Review:       Assessment:          Diagnosis Plan   1. History of permanent cardiac PM, 2000--dual-chamber PM for SSS, A " flutter, AVB after CABG. 2006--PM replacement.  Medtronic EnRhythm [id number]. 11/30/2015--PM generator change.     2. History of atrial flutter, 2003--successful catheter ablation for persistent atrial flutter.     3. Occlusive coronary artery disease, 12/11/2000--status post 5 vessel CABG     4. Benign essential hypertension     5. AF (paroxysmal atrial fibrillation) (CMS/[facility])            Plan:     Her Cardiac status is stable and she is on GDMT     Pacer is ok     New AF tolerating apixaban

## 2020-06-15 ENCOUNTER — CLINICAL SUPPORT NO REQUIREMENTS (OUTPATIENT)
Dept: CARDIOLOGY | Facility: CLINIC | Age: 85
End: 2020-06-15

## 2020-06-15 DIAGNOSIS — I44.2 COMPLETE HEART BLOCK (HCC): Primary | ICD-10-CM

## 2020-06-15 PROCEDURE — 93280 PM DEVICE PROGR EVAL DUAL: CPT | Performed by: INTERNAL MEDICINE

## 2020-07-05 DIAGNOSIS — I10 BENIGN ESSENTIAL HYPERTENSION: Chronic | ICD-10-CM

## 2020-07-06 RX ORDER — VALSARTAN AND HYDROCHLOROTHIAZIDE 160; 12.5 MG/1; MG/1
TABLET, FILM COATED ORAL
Qty: 90 TABLET | Refills: 2 | Status: SHIPPED | OUTPATIENT
Start: 2020-07-06 | End: 2021-01-01 | Stop reason: HOSPADM

## 2020-07-06 RX ORDER — DOXAZOSIN 2 MG/1
TABLET ORAL
Qty: 90 TABLET | Refills: 2 | Status: SHIPPED | OUTPATIENT
Start: 2020-07-06 | End: 2021-01-01

## 2020-07-09 DIAGNOSIS — E03.8 SUBCLINICAL HYPOTHYROIDISM: Chronic | ICD-10-CM

## 2020-07-09 DIAGNOSIS — E78.2 MIXED HYPERLIPIDEMIA: Chronic | ICD-10-CM

## 2020-07-09 DIAGNOSIS — Z51.81 THERAPEUTIC DRUG MONITORING: ICD-10-CM

## 2020-07-09 DIAGNOSIS — E55.9 VITAMIN D DEFICIENCY: Chronic | ICD-10-CM

## 2020-07-09 DIAGNOSIS — R73.01 IMPAIRED FASTING GLUCOSE: Chronic | ICD-10-CM

## 2020-07-10 ENCOUNTER — LAB (OUTPATIENT)
Dept: LAB | Facility: HOSPITAL | Age: 85
End: 2020-07-10

## 2020-07-10 LAB
25(OH)D3 SERPL-MCNC: 48.1 NG/ML (ref 30–100)
ALBUMIN SERPL-MCNC: 4.1 G/DL (ref 3.5–5.2)
ALBUMIN/GLOB SERPL: 1.5 G/DL
ALP SERPL-CCNC: 77 U/L (ref 39–117)
ALT SERPL W P-5'-P-CCNC: 14 U/L (ref 1–33)
ANION GAP SERPL CALCULATED.3IONS-SCNC: 8.4 MMOL/L (ref 5–15)
AST SERPL-CCNC: 10 U/L (ref 1–32)
BILIRUB SERPL-MCNC: 0.5 MG/DL (ref 0–1.2)
BUN SERPL-MCNC: 19 MG/DL (ref 8–23)
BUN/CREAT SERPL: 24.4 (ref 7–25)
CALCIUM SPEC-SCNC: 9.8 MG/DL (ref 8.2–9.6)
CHLORIDE SERPL-SCNC: 95 MMOL/L (ref 98–107)
CO2 SERPL-SCNC: 27.6 MMOL/L (ref 22–29)
CREAT SERPL-MCNC: 0.78 MG/DL (ref 0.57–1)
DEPRECATED RDW RBC AUTO: 43 FL (ref 37–54)
ERYTHROCYTE [DISTWIDTH] IN BLOOD BY AUTOMATED COUNT: 13.9 % (ref 12.3–15.4)
GFR SERPL CREATININE-BSD FRML MDRD: 69 ML/MIN/1.73
GLOBULIN UR ELPH-MCNC: 2.8 GM/DL
GLUCOSE SERPL-MCNC: 105 MG/DL (ref 65–99)
HBA1C MFR BLD: 6.28 % (ref 4.8–5.6)
HCT VFR BLD AUTO: 41.4 % (ref 34–46.6)
HGB BLD-MCNC: 14 G/DL (ref 12–15.9)
MCH RBC QN AUTO: 28.9 PG (ref 26.6–33)
MCHC RBC AUTO-ENTMCNC: 33.8 G/DL (ref 31.5–35.7)
MCV RBC AUTO: 85.4 FL (ref 79–97)
PLATELET # BLD AUTO: 236 10*3/MM3 (ref 140–450)
PMV BLD AUTO: 9.8 FL (ref 6–12)
POTASSIUM SERPL-SCNC: 4.2 MMOL/L (ref 3.5–5.2)
PROT SERPL-MCNC: 6.9 G/DL (ref 6–8.5)
RBC # BLD AUTO: 4.85 10*6/MM3 (ref 3.77–5.28)
SODIUM SERPL-SCNC: 131 MMOL/L (ref 136–145)
T3FREE SERPL-MCNC: 2.95 PG/ML (ref 2–4.4)
T4 FREE SERPL-MCNC: 1.42 NG/DL (ref 0.93–1.7)
TSH SERPL DL<=0.05 MIU/L-ACNC: 3.61 UIU/ML (ref 0.27–4.2)
WBC # BLD AUTO: 9.26 10*3/MM3 (ref 3.4–10.8)

## 2020-07-10 PROCEDURE — 84443 ASSAY THYROID STIM HORMONE: CPT | Performed by: INTERNAL MEDICINE

## 2020-07-10 PROCEDURE — 83704 LIPOPROTEIN BLD QUAN PART: CPT | Performed by: INTERNAL MEDICINE

## 2020-07-10 PROCEDURE — 80061 LIPID PANEL: CPT | Performed by: INTERNAL MEDICINE

## 2020-07-10 PROCEDURE — 85027 COMPLETE CBC AUTOMATED: CPT | Performed by: INTERNAL MEDICINE

## 2020-07-10 PROCEDURE — 82306 VITAMIN D 25 HYDROXY: CPT | Performed by: INTERNAL MEDICINE

## 2020-07-10 PROCEDURE — 80053 COMPREHEN METABOLIC PANEL: CPT | Performed by: INTERNAL MEDICINE

## 2020-07-10 PROCEDURE — 36415 COLL VENOUS BLD VENIPUNCTURE: CPT

## 2020-07-10 PROCEDURE — 84439 ASSAY OF FREE THYROXINE: CPT | Performed by: INTERNAL MEDICINE

## 2020-07-10 PROCEDURE — 84481 FREE ASSAY (FT-3): CPT | Performed by: INTERNAL MEDICINE

## 2020-07-10 PROCEDURE — 83036 HEMOGLOBIN GLYCOSYLATED A1C: CPT | Performed by: INTERNAL MEDICINE

## 2020-07-12 LAB
CHOLEST SERPL-MCNC: 236 MG/DL (ref 100–199)
HDL SERPL-SCNC: 37.8 UMOL/L
HDLC SERPL-MCNC: 63 MG/DL
LDL-P: 1767 NMOL/L
LDLC REAL SIZE PAT SERPL: 21.3 NM
LDLC SERPL CALC-MCNC: 149 MG/DL (ref 0–99)
SMALL LDL-P: 651 NMOL/L
TRIGL SERPL-MCNC: 118 MG/DL (ref 0–149)

## 2020-08-05 PROBLEM — I48.0 PAROXYSMAL ATRIAL FIBRILLATION (HCC): Chronic | Status: ACTIVE | Noted: 2020-04-15

## 2020-08-05 PROBLEM — Z79.01 CHRONIC ANTICOAGULATION: Status: ACTIVE | Noted: 2020-01-01

## 2020-08-05 PROBLEM — R05.8 DRY COUGH: Status: RESOLVED | Noted: 2020-02-04 | Resolved: 2020-01-01

## 2020-08-05 PROBLEM — Z79.01 CHRONIC ANTICOAGULATION: Chronic | Status: ACTIVE | Noted: 2020-01-01

## 2020-08-05 PROBLEM — R68.89 FLU-LIKE SYMPTOMS: Status: RESOLVED | Noted: 2020-02-04 | Resolved: 2020-01-01

## 2020-08-05 PROBLEM — Z20.828 EXPOSURE TO INFLUENZA: Status: RESOLVED | Noted: 2020-02-04 | Resolved: 2020-01-01

## 2020-08-05 NOTE — TELEPHONE ENCOUNTER
This was never discussed.  What exactly is wrong with her hands.  Her medicine list shows nystatin powder and not cream.  Try to find out some details.

## 2020-08-05 NOTE — TELEPHONE ENCOUNTER
Pt says the creme is clotrimazole-betametha.  This is not on her med list.  Can you send it in to [facility]

## 2020-08-05 NOTE — PROGRESS NOTES
The ABCs of the Annual Wellness Visit  Subsequent Medicare Wellness Visit    No chief complaint on file.      Subjective   History of Present Illness:  Lora Serrano is a 89 y.o. female who presents for a Subsequent Medicare Wellness Visit.    HEALTH RISK ASSESSMENT    Recent Hospitalizations:  No hospitalization(s) within the last year.    Current Medical Providers:  Patient Care Team:  Adilson Mix MD as PCP - General (Internal Medicine)  Lora Hope PA as PCP - Claims Attributed    Smoking Status:  Social History     Tobacco Use   Smoking Status Never Smoker   Smokeless Tobacco Never Used       Alcohol Consumption:  Social History     Substance and Sexual Activity   Alcohol Use No   • Frequency: Never   • Binge frequency: Never       Depression Screen:   PHQ-2/PHQ-9 Depression Screening 8/5/2020   Little interest or pleasure in doing things 0   Feeling down, depressed, or hopeless 1   Trouble falling or staying asleep, or sleeping too much -   Feeling tired or having little energy -   Poor appetite or overeating -   Feeling bad about yourself - or that you are a failure or have let yourself or your family down -   Trouble concentrating on things, such as reading the newspaper or watching television -   Moving or speaking so slowly that other people could have noticed. Or the opposite - being so fidgety or restless that you have been moving around a lot more than usual -   Thoughts that you would be better off dead, or of hurting yourself in some way -   Total Score 1   If you checked off any problems, how difficult have these problems made it for you to do your work, take care of things at home, or get along with other people? -       Fall Risk Screen:  STEADI Fall Risk Assessment was completed, and patient is at LOW risk for falls.Assessment completed on:1/17/2020    Health Habits and Functional and Cognitive Screening:  Functional & Cognitive Status 8/5/2020   Do you have difficulty  preparing food and eating? Yes   Do you have difficulty bathing yourself, getting dressed or grooming yourself? No   Do you have difficulty using the toilet? Yes   Do you have difficulty moving around from place to place? Yes   Do you have trouble with steps or getting out of a bed or a chair? Yes   Current Diet Well Balanced Diet   Dental Exam Up to date   Eye Exam Up to date   Exercise (times per week) 0 times per week   Current Exercise Activities Include None   Do you need help using the phone?  No   Are you deaf or do you have serious difficulty hearing?  Yes   Do you need help with transportation? Yes   Do you need help shopping? Yes   Do you need help preparing meals?  Yes   Do you need help with housework?  Yes   Do you need help with laundry? Yes   Do you need help taking your medications? Yes   Do you need help managing money? Yes   Do you ever drive or ride in a car without wearing a seat belt? No   Have you felt unusual stress, anger or loneliness in the last month? No   Who do you live with? Child   If you need help, do you have trouble finding someone available to you? No   Have you been bothered in the last four weeks by sexual problems? No   Do you have difficulty concentrating, remembering or making decisions? Yes         Does the patient have evidence of cognitive impairment? No    Asprin use counseling:Contraindicated from taking ASA    Age-appropriate Screening Schedule:  Refer to the list below for future screening recommendations based on patient's age, sex and/or medical conditions. Orders for these recommended tests are listed in the plan section. The patient has been provided with a written plan.    Health Maintenance   Topic Date Due   • INFLUENZA VACCINE  08/01/2020   • LIPID PANEL  07/10/2021   • TDAP/TD VACCINES (2 - Td) 03/28/2027   • MAMMOGRAM  Discontinued   • DXA SCAN  Discontinued   • ZOSTER VACCINE  Discontinued          The following portions of the patient's history were reviewed  and updated as appropriate: allergies, current medications, past family history, past medical history, past social history, past surgical history and problem list.    Outpatient Medications Prior to Visit   Medication Sig Dispense Refill   • apixaban (ELIQUIS) 2.5 MG tablet tablet Take 1 tablet by mouth Every 12 (Twelve) Hours. 60 tablet 2   • Cholecalciferol (VITAMIN D) 1000 UNITS tablet Take 1 tablet by mouth daily.     • Coenzyme Q10 (COQ10) 400 MG capsule Take 1 capsule by mouth daily. With food     • doxazosin (CARDURA) 2 MG tablet TAKE ONE TABLET BY MOUTH DAILY FOR HIGH BLOOD PRESSURE 90 tablet 2   • metoprolol succinate XL (TOPROL-XL) 50 MG 24 hr tablet Take 1 p.o. every morning for high blood pressure and heart 90 tablet 3   • nystatin (MYCOSTATIN) 972142 UNIT/GM powder Apply  topically to the appropriate area as directed 2 (Two) Times a Day. 60 g 0   • Red Yeast Rice 600 MG tablet Take 2 tablets by mouth every day.     • valsartan-hydrochlorothiazide (DIOVAN-HCT) 160-12.5 MG per tablet TAKE ONE TABLET BY MOUTH EVERY MORNING FOR HIGH BLOOD PRESSURE 90 tablet 2     No facility-administered medications prior to visit.        Patient Active Problem List   Diagnosis   • Occlusive coronary artery disease, 12/11/2000--status post 5 vessel CABG   • Subclinical hypothyroidism   • Impaired fasting glucose   • Onychomycosis of fingernails   • Osteopenia of multiple sites   • Vitamin D deficiency   • Hyperlipidemia   • Benign essential hypertension   • Generalized osteoarthritis of multiple sites   • Therapeutic drug monitoring   • History of atrial flutter, 2003--successful catheter ablation for persistent atrial flutter.   • Moderate mitral regurgitation, 02/18/2012--moderate MR, EF 58%.   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 2006--PM replacement.  Medtronic EnRhythm [id number]. 11/30/2015--PM generator change.   • Primary osteoarthritis of both knees   • Statin  "intolerance   • Mitral valve annular calcification   • Paroxysmal atrial fibrillation (CMS/HCC)   • Chronic anticoagulation, Eliquis for paroxysmal atrial fibrillation       Advanced Care Planning:  ACP discussion was held with the patient during this visit. Patient has an advance directive in EMR which is still valid.     Review of Systems   Constitutional: Negative.    HENT: Negative.    Eyes: Negative.    Respiratory: Negative.    Cardiovascular: Negative.    Gastrointestinal: Negative.    Endocrine: Negative.    Genitourinary: Negative.    Musculoskeletal: Positive for arthralgias.   Skin: Negative.    Neurological: Negative.    Hematological: Negative.    Psychiatric/Behavioral: Negative.        Compared to one year ago, the patient feels her physical health is the same.  Compared to one year ago, the patient feels her mental health is the same.    Reviewed chart for potential of high risk medication in the elderly: yes  Reviewed chart for potential of harmful drug interactions in the elderly:yes    Objective         Vitals:    08/05/20 0914   BP: 142/60   BP Location: Left arm   Pulse: 73   SpO2: 98%   Weight: 53.6 kg (118 lb 3.2 oz)   Height: 149.9 cm (59.02\")       Body mass index is 23.86 kg/m².  Discussed the patient's BMI with her. The BMI is in the acceptable range.    Physical Exam     General: Alert and oriented x 3.  No acute distress.  Normal affect.  HEENT: Pupils equal, round, reactive to light; extraocular movements intact; sclerae nonicteric; pharynx, ear canals and TMs normal.  Neck: Without JVD, thyromegaly, bruit, or adenopathy.  Lungs: Clear to auscultation in all fields.  Heart: Regular rate and rhythm without rub, gallop, or click.  There is a 1/6 to 2/6 systolic murmur left sternal border.  Abdomen: Soft, nontender, without hepatosplenomegaly or hernia.  Bowel sounds normal.  : Deferred.  Rectal: Deferred.  Extremities: Without clubbing, cyanosis, edema, or pulse deficit.  Bilateral " varicosities and venous stasis changes.  Neurologic: Intact without focal deficit.  Normal station and gait observed during ingress and egress from the examination room.  Skin: Without significant lesion.  Musculoskeletal: Unremarkable.  Lab Results   Component Value Date    CHLPL 236 (H) 07/10/2020    TRIG 118 07/10/2020    HGBA1C 6.28 (H) 07/10/2020        Assessment/Plan   Medicare Risks and Personalized Health Plan  CMS Preventative Services Quick Reference  Advance Directive Discussion  Cardiovascular risk  Diabetic Lab Screening     The above risks/problems have been discussed with the patient.  Pertinent information has been shared with the patient in the After Visit Summary.  Follow up plans and orders are seen below in the Assessment/Plan Section.    Diagnoses and all orders for this visit:    1. Medicare annual wellness visit, subsequent (Primary)    2. Impaired fasting glucose    3. Hyperlipidemia    4. Benign essential hypertension    5. Subclinical hypothyroidism    6. Occlusive coronary artery disease, 12/11/2000--status post 5 vessel CABG    7. History of atrial flutter, 2003--successful catheter ablation for persistent atrial flutter.    8. Paroxysmal atrial fibrillation (CMS/[facility])    9. Chronic anticoagulation, Eliquis for paroxysmal atrial fibrillation    10. Mitral valve annular calcification    11. Moderate mitral regurgitation, 02/18/2012--moderate MR, EF 58%.    12. History of permanent cardiac PM, 2000--dual-chamber PM for SSS, A flutter, AVB after CABG. 2006--PM replacement.  Medtronic EnRhythm [id number]. 11/30/2015--PM generator change.    13. Statin intolerance    14. Generalized osteoarthritis of multiple sites    15. Osteopenia of multiple sites    16. Primary osteoarthritis of both knees    17. Therapeutic drug monitoring      Follow Up:  Return in about 5 months (around 1/5/2021).     An After Visit Summary and PPPS were given to the patient.

## 2020-08-05 NOTE — TELEPHONE ENCOUNTER
I called the home phone and daughters cell phone and left msg on her cell to try and find out more information

## 2020-08-05 NOTE — TELEPHONE ENCOUNTER
Pt daughter called in and said you were going to send in something different for her hands since the nystatin creme wasn't working

## 2020-08-05 NOTE — PROGRESS NOTES
08/05/2020    Patient Information  Lora Serrano                                                                                          [address] [facility] [address]      7/15/1926  [unfilled]  There is no work phone number on file.    Chief Complaint:     Subsequent Medicare wellness visit.  Follow-up recent lab work in order to monitor chronic medical issues.  No new acute complaints.    History of Present Illness:    Patient with a history of impaired fasting glucose, hyperlipidemia, hypertension, subclinical hypothyroidism, occlusive coronary artery disease, history of atrial flutter and paroxysmal atrial fibrillation, chronic anticoagulation with Eliquis, moderate valvular heart disease, history of permanent cardiac pacemaker and history of statin intolerance.  She presents today for subsequent Medicare wellness visit.  She also had lab work in order to monitor chronic medical issues.  Past medical history reviewed and updated were necessary including health maintenance parameters.  This reveals she will be up-to-date or else accounted for after today's visit.    Review of Systems   Constitution: Negative.   HENT: Negative.    Eyes: Negative.    Cardiovascular: Negative.    Respiratory: Negative.    Endocrine: Negative.    Hematologic/Lymphatic: Negative.    Skin: Negative.    Musculoskeletal: Positive for arthritis and joint pain.   Gastrointestinal: Negative.    Genitourinary: Negative.    Neurological: Negative.    Psychiatric/Behavioral: Negative.    Allergic/Immunologic: Negative.        Active Problems:    Patient Active Problem List   Diagnosis   • Occlusive coronary artery disease, 12/11/2000--status post 5 vessel CABG   • Subclinical hypothyroidism   • Impaired fasting glucose   • Onychomycosis of fingernails   • Osteopenia of multiple sites   • Vitamin D deficiency   • Hyperlipidemia   • Benign essential hypertension   • Generalized osteoarthritis of multiple sites   •  Therapeutic drug monitoring   • History of atrial flutter, 2003--successful catheter ablation for persistent atrial flutter.   • Moderate mitral regurgitation, 02/18/2012--moderate MR, EF 58%.   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 2006--PM replacement.  Medtronic EnRhythm [id number]. 11/30/2015--PM generator change.   • Primary osteoarthritis of both knees   • Statin intolerance   • Mitral valve annular calcification   • Paroxysmal atrial fibrillation (CMS/HCC)   • Chronic anticoagulation, Eliquis for paroxysmal atrial fibrillation         Past Medical History:   Diagnosis Date   • Benign essential hypertension 3/14/2016   • Chronic anticoagulation, Eliquis for paroxysmal atrial fibrillation 8/5/2020   • Generalized osteoarthritis of multiple sites 3/14/2016   • History of atrial flutter, 2003--successful catheter ablation for persistent atrial flutter. 2003 2003--catheter ablation for persistent atrial flutter was successful.   • History of complete AV block, 10/26/2006--permanent pacemaker replacement.  12/18/2000--dual-chamber pacemaker placement for sick sinus syndrome, atrial flutter, AV block after CABG. 12/18/2000 12/18/2000--dual-chamber pacemaker placement for sick sinus syndrome, atrial flutter, AV block that occurred after her bypass surgery. 10/26/2006--status post pacemaker replacement. Medtronic EnRhythm [id number]   • History of drug rash, 01/11/2014--name brand Synthroid 01/11/2014 01/11/2014--patient presented with a diffuse erythematous rash that was very pruritic. Examination consistent with a drug reaction/allergic dermatitis. Believed to be secondary to name brand Synthroid. This was discontinued and patient treated with a Medrol Dosepak.   • History of osteoporosis, June 2001 osteoporosis of the LS spine.  2011--improved to osteopenia only. 2011,2001 01/31/2011--DEXA scan  revealed LS spine T score -2.2, left hip -1.0.  Now  osteopenia.   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 2006--PM replacement.  Medtronic EnRhythm [id number]. 11/30/2015--PM generator change. 11/30/2015 11/30/2015--pacemaker generator change.  10/26/2006--status post pacemaker replacement. Medtronic EnRhythm [id number]  12/18/2000--dual-chamber pacemaker placement for sick sinus syndrome, atrial flutter, AV block that occurred after her bypass surgery.   2000--dual-chamber PM for SSS, A flutter, AVB after CABG. 2006--PM replacement.  Medtronic EnRhythm [id number]. 11/30/2015--PM generator change.   • Hyperlipidemia 3/14/2016   • Impaired fasting glucose 3/16/2016    03/16/2015--type 2 diabetes is now evolved into impaired fasting glucose.  Hemoglobin A1c 5.7.  Diagnosed January 2010   • Mitral valve annular calcification 7/16/2019    January 15, 2019--CT scan of the chest without contrast reveals nodular calcification within the mitral valve annulus that accounts for a nodular opacity in the left lower lung zone shown on the patient's recent chest x-ray.  No pulmonary nodules or infiltrates are currently identified.  Mild cardiomegaly.  Small fixed hiatal hernia.  01/15/2019--patient seen in follow-up and reports her lower res   • Moderate mitral regurgitation, 02/18/2012--moderate MR, EF 58%. 1/18/2012 01/18/2012 revealed ejection fraction of 58%, moderate mitral regurgitation.   • Occlusive coronary artery disease, 12/11/2000--status post 5 vessel CABG 12/11/2000 12/11/2000--status post 5 vessel CABG.  Patient subsequently had chest pain from her sternal wires which were removed.   • Onychomycosis of fingernails 3/14/2016    Patient has had onychomycosis involving her fingernails for several years.  She tries to control it with Ertaczo cream.   • Osteopenia of multiple sites 6/1/2001 01/31/2011--DEXA scan  revealed LS spine T score -2.2, left hip -1.0.   Now osteopenia.   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   • Paroxysmal atrial fibrillation (CMS/HCC) 4/15/2020   • Primary osteoarthritis of both knees 9/28/2016 09/28/2016--patient seen in follow-up and continues to have bilateral knee discomfort particularly with prolonged use.  Right knee is worse than the left.  She understandably does not want have a knee replacement but I think she would benefit from orthopedic evaluation for consideration of cortisone or visco supplementation.  Patient referred to Dr. Wallace.  08/26/2016--x-ray of the right knee reveals very severe degenerative changes involving the medial compartment with marked joint space narrowing and subchondral sclerosis and marginal spurring.  No joint effusion and no acute pathology.   • Statin intolerance 3/28/2017    Intolerance to multiple statins.   • Status post catheter ablation of atrial flutter, 2002--successful catheter ablation for persistent atrial flutter. 2003 2003--catheter ablation for persistent atrial flutter was successful.   • Subclinical hypothyroidism 5/9/2013    Diagnosed 05/16/2013.   05/09/2013--ultrasound thyroid for new hypothyroidism revealed a small thyroid gland consistent with hypothyroidism.  Mixed nodule present.   • Vitamin D deficiency 3/14/2016         Past Surgical History:   Procedure Laterality Date   • CARDIAC ABLATION  07/02/2003 07/02/2003--catheter ablation for persistent atrial flutter was successful.   • CARDIAC PACEMAKER PLACEMENT  12/18/2000 12/18/2000--dual-chamber pacemaker placement for sick sinus syndrome, atrial flutter, AV block that occurred after her bypass surgery.   • CARPAL TUNNEL RELEASE Right 01/2010 January 2010--status post right carpal tunnel release. No surgical treatment on left.   • CORONARY ARTERY BYPASS GRAFT  12/2000 December 2000--coronary artery bypass x 5   • HX OVARIAN CYSTECTOMY  Remote    Remote ovarian cyst excision.   • INCISIONAL BREAST BIOPSY   Remote    Benign   • PACEMAKER REPLACEMENT  10/26/2006    10/26/2006--status post pacemaker replacement. Medtronic EnRhythm [id number]    • PACEMAKER REPLACEMENT  11/30/2015 11/30/2015--pacemaker generator change.         Allergies   Allergen Reactions   • Atorvastatin    • Levothyroxine Sodium    • Other      Seafood   • Penicillins            Current Outpatient Medications:   •  apixaban (ELIQUIS) 2.5 MG tablet tablet, Take 1 tablet by mouth Every 12 (Twelve) Hours., Disp: 60 tablet, Rfl: 2  •  Cholecalciferol (VITAMIN D) 1000 UNITS tablet, Take 1 tablet by mouth daily., Disp: , Rfl:   •  Coenzyme Q10 (COQ10) 400 MG capsule, Take 1 capsule by mouth daily. With food, Disp: , Rfl:   •  doxazosin (CARDURA) 2 MG tablet, TAKE ONE TABLET BY MOUTH DAILY FOR HIGH BLOOD PRESSURE, Disp: 90 tablet, Rfl: 2  •  metoprolol succinate XL (TOPROL-XL) 50 MG 24 hr tablet, Take 1 p.o. every morning for high blood pressure and heart, Disp: 90 tablet, Rfl: 3  •  nystatin (MYCOSTATIN) 459508 UNIT/GM powder, Apply  topically to the appropriate area as directed 2 (Two) Times a Day., Disp: 60 g, Rfl: 0  •  Red Yeast Rice 600 MG tablet, Take 2 tablets by mouth every day., Disp: , Rfl:   •  valsartan-hydrochlorothiazide (DIOVAN-HCT) 160-12.5 MG per tablet, TAKE ONE TABLET BY MOUTH EVERY MORNING FOR HIGH BLOOD PRESSURE, Disp: 90 tablet, Rfl: 2      Family History   Problem Relation Age of Onset   • Hyperlipidemia Mother    • Breast cancer Sister    • Diabetes type II Sister          Social History     Socioeconomic History   • Marital status:      Spouse name: Not on file   • Number of children: 2   • Years of education: Not on file   • Highest education level: 12th grade   Occupational History   • Occupation: Retired   Social Needs   • Financial resource strain: Not hard at all   • Food insecurity:     Worry: Never true     Inability: Never true   • Transportation needs:     Medical: No     Non-medical: No   Tobacco Use   • Smoking  "status: Never Smoker   • Smokeless tobacco: Never Used   Substance and Sexual Activity   • Alcohol use: No     Frequency: Never     Binge frequency: Never   • Drug use: No   • Sexual activity: Not Currently     Partners: Male   Lifestyle   • Physical activity:     Days per week: 0 days     Minutes per session: 0 min   • Stress: Not at all   Relationships   • Social connections:     Talks on phone: More than three times a week     Gets together: Twice a week     Attends Mu-[employer] service: More than 4 times per year     Active member of club or organization: No     Attends meetings of clubs or organizations: Never     Relationship status:          Vitals:    08/05/20 0914   BP: 142/60   BP Location: Left arm   Pulse: 73   SpO2: 98%   Weight: 53.6 kg (118 lb 3.2 oz)   Height: 149.9 cm (59.02\")        Body mass index is 23.86 kg/m².      Physical Exam:    General: Alert and oriented x 3.  No acute distress.  Normal affect.  HEENT: Pupils equal, round, reactive to light; extraocular movements intact; sclerae nonicteric; pharynx, ear canals and TMs normal.  Neck: Without JVD, thyromegaly, bruit, or adenopathy.  Lungs: Clear to auscultation in all fields.  Heart: Regular rate and rhythm without rub, gallop, or click.  There is a 1/6 to 2/6 systolic murmur left sternal border.  Abdomen: Soft, nontender, without hepatosplenomegaly or hernia.  Bowel sounds normal.  : Deferred.  Rectal: Deferred.  Extremities: Without clubbing, cyanosis, edema, or pulse deficit.  Bilateral varicosities and venous stasis changes.  Neurologic: Intact without focal deficit.  Normal station and gait observed during ingress and egress from the examination room.  Skin: Without significant lesion.  Musculoskeletal: Unremarkable.    Lab/other results:    NMR reveals total cholesterol 236.  Triglycerides normal at 118.  LDL particle number elevated 1767.  Small LDL particle number elevated at 651.  HDL particle number fairly good at 37.8.  CMP " normal except glucose 105.  Serum sodium is slightly low at 131.  Calcium slightly elevated at 9.8.  Hemoglobin A1c 6.28.  CBC normal.  Thyroid function test normal.  Vitamin D normal.    Assessment/Plan:     Diagnosis Plan   1. Medicare annual wellness visit, subsequent     2. Impaired fasting glucose     3. Hyperlipidemia     4. Benign essential hypertension     5. Subclinical hypothyroidism     6. Occlusive coronary artery disease, 12/11/2000--status post 5 vessel CABG     7. History of atrial flutter, 2003--successful catheter ablation for persistent atrial flutter.     8. Paroxysmal atrial fibrillation (CMS/[facility])     9. Chronic anticoagulation, Eliquis for paroxysmal atrial fibrillation     10. Mitral valve annular calcification     11. Moderate mitral regurgitation, 02/18/2012--moderate MR, EF 58%.     12. History of permanent cardiac PM, 2000--dual-chamber PM for SSS, A flutter, AVB after CABG. 2006--PM replacement.  Medtronic EnRhythm [id number]. 11/30/2015--PM generator change.     13. Statin intolerance     14. Generalized osteoarthritis of multiple sites     15. Osteopenia of multiple sites     16. Primary osteoarthritis of both knees     17. Therapeutic drug monitoring       The subsequent Medicare wellness visit is documented on separate note.    Patient has mild impaired fasting glucose that does not require medication.  Given her age, I am not going to recommend a strict diet.  Patient has hyperlipidemia and is statin intolerant and we are not going to treat her with any cholesterol medications given the overall clinical picture.  Her blood pressure seems to be well controlled.  Subclinical hypothyroidism is not evident on today's labs but we will continue to monitor.  She has occlusive coronary artery disease and history of atrial flutter as well as a new diagnosis of paroxysmal atrial fibrillation and she is now on chronic anticoagulation with Eliquis at the direction of the cardiologist.  She also has  a permanent cardiac pacemaker and has that checked on a monthly basis.  She does have generalized osteoarthritis particularly involving her knees but she receives cortisone injections which seem to be helpful.  She also has osteopenia but we have decided not to pursue any further DEXA scans.    Plan is as follows: Patient will follow-up sometime in November and I will order nonfasting lab work that day.  This is primarily to assess her thyroid function, sodium levels, and CBC to make sure she is not bleeding from the chronic anticoagulation with Eliquis.    Procedures

## 2020-08-05 NOTE — TELEPHONE ENCOUNTER
Pt called in and its for a fungus under her mails and the nystatin creme doesn't help.  It keeps it under control but it not any better. She would like to try something else     Pt would like for you to send in the nystatin creme.  She said the tube is empty.

## 2020-08-05 NOTE — TELEPHONE ENCOUNTER
There is no topical preparation that is effective for nail fungus.  Oral medication can be helpful but I think the risk of taking this outweighs any benefits.  Tell patient I could refer her to a dermatologist if she likes.

## 2020-11-05 PROBLEM — I48.21 PERMANENT ATRIAL FIBRILLATION (HCC): Status: ACTIVE | Noted: 2020-01-01

## 2020-11-05 PROBLEM — I48.0 PAROXYSMAL ATRIAL FIBRILLATION (HCC): Chronic | Status: RESOLVED | Noted: 2020-04-15 | Resolved: 2020-01-01

## 2020-11-05 NOTE — PROGRESS NOTES
Date of Office Visit: 2020  Encounter Provider: WATSON Noonan  Place of Service: [facility] CARDIOLOGY  Patient Name: Lora Serrano  :7/15/1926    Chief Complaint   Patient presents with   • Pacemaker Check   • Atrial Fibrillation   • Cardiomyopathy   :     HPI: Lora Serrano is a 89 y.o. female who is a patient of Dr. Fontanez's with hx of CAD, s/p CABG (), aflutter, s/p ablation (), AV block after CABG, s/p PPM () and HTN.     She presents today for routine follow up.     She is accompanied today by her daughter.  They both report that she is doing pretty good.  She did have a bout of some severe diarrhea a couple of weeks ago, she did go to urgent care and they placed her on antibiotic for 5 days.  Her diarrhea resolved and she is back to normal.  They were concerned because she did have a little bit of blood streaking in the diarrhea, the urgent care physician felt like this was probably just from irritation and she has had no recurrence of that.    She denies any chest pain with activity, she does describe a few seconds to she says less than a minute occasional times where she feels a little tightness in her chest, this is mostly when she is laying there and again it is very brief in duration and has no accompanying symptoms at that time.    She denies any significant shortness of breath although she says if she tries to do too much sometimes she does get a little short of breath.  No PND orthopnea.  She does have some mild lower extremity edema which is unchanged.    She is on apixaban for anticoagulation.     Device interrogation today shows normal testing and function with no ventricular arrhythmia episodes.  All of her recent checks have showed 100% underlying A. fib.       Past Medical History:   Diagnosis Date   • Benign essential hypertension 3/14/2016   • Chronic anticoagulation, Eliquis for paroxysmal atrial fibrillation 2020   • Generalized  osteoarthritis of multiple sites 3/14/2016   • History of atrial flutter, 2003--successful catheter ablation for persistent atrial flutter. 2003 2003--catheter ablation for persistent atrial flutter was successful.   • History of complete AV block, 10/26/2006--permanent pacemaker replacement.  12/18/2000--dual-chamber pacemaker placement for sick sinus syndrome, atrial flutter, AV block after CABG. 12/18/2000 12/18/2000--dual-chamber pacemaker placement for sick sinus syndrome, atrial flutter, AV block that occurred after her bypass surgery. 10/26/2006--status post pacemaker replacement. Medtronic EnRhythm [id number]   • History of drug rash, 01/11/2014--name brand Synthroid 01/11/2014 01/11/2014--patient presented with a diffuse erythematous rash that was very pruritic. Examination consistent with a drug reaction/allergic dermatitis. Believed to be secondary to name brand Synthroid. This was discontinued and patient treated with a Medrol Dosepak.   • History of osteoporosis, June 2001 osteoporosis of the LS spine.  2011--improved to osteopenia only. 2011,2001 01/31/2011--DEXA scan  revealed LS spine T score -2.2, left hip -1.0.  Now osteopenia.   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 2006--PM replacement.  Medtronic EnRhythm [id number]. 11/30/2015--PM generator change. 11/30/2015 11/30/2015--pacemaker generator change.  10/26/2006--status post pacemaker replacement. Medtronic EnRhythm [id number]  12/18/2000--dual-chamber pacemaker placement for sick sinus syndrome, atrial flutter, AV block that occurred after her bypass surgery.   2000--dual-chamber PM for SSS, A flutter, AVB after CABG. 2006--PM replacement.  Medtronic EnRhythm [id number]. 11/30/2015--PM generator change.   • Hyperlipidemia 3/14/2016   • Impaired fasting glucose 3/16/2016    03/16/2015--type 2 diabetes is now evolved into impaired  fasting glucose.  Hemoglobin A1c 5.7.  Diagnosed January 2010   • Mitral valve annular calcification 7/16/2019    January 15, 2019--CT scan of the chest without contrast reveals nodular calcification within the mitral valve annulus that accounts for a nodular opacity in the left lower lung zone shown on the patient's recent chest x-ray.  No pulmonary nodules or infiltrates are currently identified.  Mild cardiomegaly.  Small fixed hiatal hernia.  01/15/2019--patient seen in follow-up and reports her lower res   • Moderate mitral regurgitation, 02/18/2012--moderate MR, EF 58%. 1/18/2012 01/18/2012 revealed ejection fraction of 58%, moderate mitral regurgitation.   • Occlusive coronary artery disease, 12/11/2000--status post 5 vessel CABG 12/11/2000 12/11/2000--status post 5 vessel CABG.  Patient subsequently had chest pain from her sternal wires which were removed.   • Onychomycosis of fingernails 3/14/2016    Patient has had onychomycosis involving her fingernails for several years.  She tries to control it with Ertaczo cream.   • Osteopenia of multiple sites 6/1/2001 01/31/2011--DEXA scan  revealed LS spine T score -2.2, left hip -1.0.  Now osteopenia.   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   • Paroxysmal atrial fibrillation (CMS/HCC) 4/15/2020   • Primary osteoarthritis of both knees 9/28/2016 09/28/2016--patient seen in follow-up and continues to have bilateral knee discomfort particularly with prolonged use.  Right knee is worse than the left.  She understandably does not want have a knee replacement but I think she would benefit from orthopedic evaluation for consideration of cortisone or visco supplementation.  Patient referred to Dr. Wallace.  08/26/2016--x-ray of the right knee reveals very severe degenerative changes involving the medial compartment with marked joint space narrowing and subchondral sclerosis and marginal spurring.  No joint effusion and no acute pathology.   •  Statin intolerance 3/28/2017    Intolerance to multiple statins.   • Status post catheter ablation of atrial flutter, 2002--successful catheter ablation for persistent atrial flutter. 2003 2003--catheter ablation for persistent atrial flutter was successful.   • Subclinical hypothyroidism 5/9/2013    Diagnosed 05/16/2013.   05/09/2013--ultrasound thyroid for new hypothyroidism revealed a small thyroid gland consistent with hypothyroidism.  Mixed nodule present.   • Vitamin D deficiency 3/14/2016       Past Surgical History:   Procedure Laterality Date   • CARDIAC ABLATION  07/02/2003 07/02/2003--catheter ablation for persistent atrial flutter was successful.   • CARDIAC PACEMAKER PLACEMENT  12/18/2000 12/18/2000--dual-chamber pacemaker placement for sick sinus syndrome, atrial flutter, AV block that occurred after her bypass surgery.   • CARPAL TUNNEL RELEASE Right 01/2010 January 2010--status post right carpal tunnel release. No surgical treatment on left.   • CORONARY ARTERY BYPASS GRAFT  12/2000 December 2000--coronary artery bypass x 5   • HX OVARIAN CYSTECTOMY  Remote    Remote ovarian cyst excision.   • INCISIONAL BREAST BIOPSY  Remote    Benign   • PACEMAKER REPLACEMENT  10/26/2006    10/26/2006--status post pacemaker replacement. Medtronic EnRhythm [id number]    • PACEMAKER REPLACEMENT  11/30/2015 11/30/2015--pacemaker generator change.       Social History     Socioeconomic History   • Marital status:      Spouse name: Not on file   • Number of children: 2   • Years of education: Not on file   • Highest education level: 12th grade   Occupational History   • Occupation: Retired   Social Needs   • Financial resource strain: Not hard at all   • Food insecurity     Worry: Never true     Inability: Never true   • Transportation needs     Medical: No     Non-medical: No   Tobacco Use   • Smoking status: Never Smoker   • Smokeless tobacco: Never Used   Substance and Sexual Activity   •  Alcohol use: No     Frequency: Never     Binge frequency: Never   • Drug use: No   • Sexual activity: Not Currently     Partners: Male   Lifestyle   • Physical activity     Days per week: 0 days     Minutes per session: 0 min   • Stress: Not at all   Relationships   • Social connections     Talks on phone: More than three times a week     Gets together: Twice a week     Attends [employer] service: More than 4 times per year     Active member of club or organization: No     Attends meetings of clubs or organizations: Never     Relationship status:        Family History   Problem Relation Age of Onset   • Hyperlipidemia Mother    • Breast cancer Sister    • Diabetes type II Sister        Review of Systems   Constitution: Negative for chills, fever and malaise/fatigue.   Cardiovascular: Negative for chest pain, dyspnea on exertion, leg swelling, near-syncope, orthopnea, palpitations, paroxysmal nocturnal dyspnea and syncope.   Respiratory: Negative for cough and shortness of breath.    Hematologic/Lymphatic: Negative.    Musculoskeletal: Negative for joint pain, joint swelling and myalgias.   Gastrointestinal: Negative for abdominal pain, diarrhea, melena, nausea and vomiting.        Had diarrhea a couple of weeks ago--went to urgent care   Genitourinary: Negative for frequency and hematuria.   Neurological: Negative for light-headedness, numbness, paresthesias and seizures.   Allergic/Immunologic: Negative.    All other systems reviewed and are negative.      Allergies   Allergen Reactions   • Atorvastatin    • Levothyroxine Sodium    • Other      Seafood   • Penicillins          Current Outpatient Medications:   •  Cholecalciferol (VITAMIN D) 1000 UNITS tablet, Take 1 tablet by mouth daily., Disp: , Rfl:   •  clotrimazole-betamethasone (LOTRISONE) 1-0.05 % cream, Apply to affected areas 3 times daily as directed, Disp: 45 g, Rfl: 2  •  Coenzyme Q10 (COQ10) 400 MG capsule, Take 1 capsule by mouth daily. With  "food, Disp: , Rfl:   •  doxazosin (CARDURA) 2 MG tablet, TAKE ONE TABLET BY MOUTH DAILY FOR HIGH BLOOD PRESSURE, Disp: 90 tablet, Rfl: 2  •  Eliquis 2.5 MG tablet tablet, TAKE ONE TABLET BY MOUTH EVERY 12 HOURS, Disp: 60 tablet, Rfl: 1  •  Fluzone High-Dose Quadrivalent 0.7 ML suspension prefilled syringe, ADM 0.7ML IM UTD, Disp: , Rfl:   •  metoprolol succinate XL (TOPROL-XL) 50 MG 24 hr tablet, TAKE ONE TABLET BY MOUTH EVERY MORNING FOR HIGH BLOOD PRESSURE AND HEART, Disp: 90 tablet, Rfl: 2  •  Red Yeast Rice 600 MG tablet, Take 2 tablets by mouth every day., Disp: , Rfl:   •  sulfamethoxazole-trimethoprim (BACTRIM DS,SEPTRA DS) 800-160 MG per tablet, Take 1 tablet by mouth 2 (Two) Times a Day., Disp: 10 tablet, Rfl: 0  •  valsartan-hydrochlorothiazide (DIOVAN-HCT) 160-12.5 MG per tablet, TAKE ONE TABLET BY MOUTH EVERY MORNING FOR HIGH BLOOD PRESSURE, Disp: 90 tablet, Rfl: 2      Objective:     Vitals:    11/05/20 0910   BP: 110/62   Pulse: 63   Weight: 52.2 kg (115 lb)   Height: 152.4 cm (60\")     Body mass index is 22.46 kg/m².    PHYSICAL EXAM:    Vitals Reviewed.   General Appearance: No acute distress, well developed and well nourished.   Eyes: Conjunctiva and lids: No erythema, swelling, or discharge. Sclera non-icteric.   HENT: Atraumatic, normocephalic. External eyes, ears, and nose normal.   Respiratory: No signs of respiratory distress. Respiration rhythm and depth normal.   Clear to auscultation. No rales, crackles, rhonchi, or wheezing auscultated.   Cardiovascular:  Heart Rate and Rhythm: Normal, Heart Sounds: S1 and S2.   Murmurs: No murmurs noted. No rubs, thrills, or gallops.   Arterial Pulses:  Posterior tibialis and dorsalis pedis pulses normal.   Lower Extremities: Trace LE edema.   Gastrointestinal:  Abdomen soft, non-distended, non-tender.   Musculoskeletal: Normal movement of extremities  Skin and Nails: General appearance normal. No pallor, cyanosis, diaphoresis. Skin temperature normal. No " clubbing of fingernails.   Psychiatric: Patient alert and oriented to person, place, and time. Speech and behavior appropriate. Normal mood and affect.       ECG 12 Lead    Date/Time: 2020 10:43 AM  Performed by: Ana Valdez APRN  Authorized by: Ana Valdez APRN   Comparison: compared with previous ECG   Similar to previous ECG  Rhythm: atrial fibrillation and paced  BPM: 70  Pacin% capture and ventricular paced rhythm            Assessment:       Diagnosis Plan   1. Permanent atrial fibrillation (CMS/HCC)     2. History of permanent cardiac PM, --dual-chamber PM for SSS, A flutter, AVB after CABG. --PM replacement.  Medtronic EnRhythm [id number]. 2015--PM generator change.     3. Occlusive coronary artery disease, 2000--status post 5 vessel CABG     4. Benign essential hypertension            Plan:       1.-2.  Permanent A. fib, AV block, status post permanent pacemaker.  Normal testing and function with no ventricular arrhythmia episodes.  Remains on low-dose apixaban for anticoagulation.    3.  History of CAD, status post CABG.  Her occasional brief episodes of chest tightness I do not think are angina I have asked her and her daughter to monitor this and call me if she has progression of her symptoms.  We discussed what those would be.    4.  Hypertension, controlled.    Remote check in 3 months and follow-up with Dr. Fontanez in 1 year with an in clinic device check.    As always, it has been a pleasure to participate in your patient's care.      Sincerely,         WATSON Pedraza

## 2020-11-25 PROBLEM — I48.21 PERMANENT ATRIAL FIBRILLATION (HCC): Chronic | Status: ACTIVE | Noted: 2020-01-01

## 2020-11-25 NOTE — PROGRESS NOTES
11/25/2020    Patient Information  Lora Serrano                                                                                          [id number] [facility] [address]      7/15/1926  [unfilled]  There is no work phone number on file.    Chief Complaint:     Follow-up lab work in order to monitor chronic medical issues listed in history of present illness.  No new acute complaints.    History of Present Illness:    Patient with a history of impaired fasting glucose, hyperlipidemia, statin intolerance, hypertension, subclinical hypothyroidism, occlusive coronary artery disease and history of CABG, history of atrial flutter, status post catheter ablation, permanent atrial fibrillation, history of permanent cardiac pacemaker placement for sick sinus syndrome and AV block after CABG, moderate mitral regurgitation with mitral valve annular calcification, chronic anticoagulation with Eliquis, generalized osteoarthritis, osteopenia.  She presents today with lab prior in order to monitor chronic medical issues.  Her past medical history reviewed and updated were necessary including health maintenance parameters.  This reveals she is up-to-date or else accounted for after today's visit.    Review of Systems   Constitution: Negative.   HENT: Negative.    Eyes: Negative.    Cardiovascular: Negative.    Respiratory: Negative.    Endocrine: Negative.    Hematologic/Lymphatic: Negative.    Skin: Negative.    Musculoskeletal: Negative.    Gastrointestinal: Negative.    Genitourinary: Negative.    Neurological: Negative.    Psychiatric/Behavioral: Negative.    Allergic/Immunologic: Negative.        Active Problems:    Patient Active Problem List   Diagnosis   • Occlusive coronary artery disease, 12/11/2000--status post 5 vessel CABG   • Subclinical hypothyroidism   • Impaired fasting glucose   • Onychomycosis of fingernails   • Osteopenia of multiple sites   • Vitamin D deficiency   • Hyperlipidemia   • Benign  essential hypertension   • Generalized osteoarthritis of multiple sites   • Therapeutic drug monitoring   • History of atrial flutter, 2003--successful catheter ablation for persistent atrial flutter.   • Moderate mitral regurgitation, 02/18/2012--moderate MR, EF 58%.   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 2006--PM replacement.  Medtronic EnRhythm [id number]. 11/30/2015--PM generator change.   • Primary osteoarthritis of both knees   • Statin intolerance   • Mitral valve annular calcification   • Chronic anticoagulation, Eliquis for permanent atrial fibrillation   • Permanent atrial fibrillation (CMS/[facility])         Past Medical History:   Diagnosis Date   • Benign essential hypertension 3/14/2016   • Chronic anticoagulation, Eliquis for permanent atrial fibrillation 8/5/2020   • Generalized osteoarthritis of multiple sites 3/14/2016   • History of atrial flutter, 2003--successful catheter ablation for persistent atrial flutter. 2003 2003--catheter ablation for persistent atrial flutter was successful.   • History of complete AV block, 10/26/2006--permanent pacemaker replacement.  12/18/2000--dual-chamber pacemaker placement for sick sinus syndrome, atrial flutter, AV block after CABG. 12/18/2000 12/18/2000--dual-chamber pacemaker placement for sick sinus syndrome, atrial flutter, AV block that occurred after her bypass surgery. 10/26/2006--status post pacemaker replacement. Medtronic EnRhythm [id number]   • History of drug rash, 01/11/2014--name brand Synthroid 01/11/2014 01/11/2014--patient presented with a diffuse erythematous rash that was very pruritic. Examination consistent with a drug reaction/allergic dermatitis. Believed to be secondary to name brand Synthroid. This was discontinued and patient treated with a Medrol Dosepak.   • History of osteoporosis, June 2001 osteoporosis of the LS spine.  2011--improved to osteopenia only. 2011,2001     01/31/2011--DEXA scan  revealed LS spine T score -2.2, left hip -1.0.  Now osteopenia.   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 2006--PM replacement.  Medtronic EnRhythm [id number]. 11/30/2015--PM generator change. 11/30/2015 11/30/2015--pacemaker generator change.  10/26/2006--status post pacemaker replacement. Medtronic EnRhythm [id number]  12/18/2000--dual-chamber pacemaker placement for sick sinus syndrome, atrial flutter, AV block that occurred after her bypass surgery.   2000--dual-chamber PM for SSS, A flutter, AVB after CABG. 2006--PM replacement.  Medtronic EnRhythm [id number]. 11/30/2015--PM generator change.   • Hyperlipidemia 3/14/2016   • Impaired fasting glucose 3/16/2016    03/16/2015--type 2 diabetes is now evolved into impaired fasting glucose.  Hemoglobin A1c 5.7.  Diagnosed January 2010   • Mitral valve annular calcification 7/16/2019    January 15, 2019--CT scan of the chest without contrast reveals nodular calcification within the mitral valve annulus that accounts for a nodular opacity in the left lower lung zone shown on the patient's recent chest x-ray.  No pulmonary nodules or infiltrates are currently identified.  Mild cardiomegaly.  Small fixed hiatal hernia.  01/15/2019--patient seen in follow-up and reports her lower res   • Moderate mitral regurgitation, 02/18/2012--moderate MR, EF 58%. 1/18/2012 01/18/2012 revealed ejection fraction of 58%, moderate mitral regurgitation.   • Occlusive coronary artery disease, 12/11/2000--status post 5 vessel CABG 12/11/2000 12/11/2000--status post 5 vessel CABG.  Patient subsequently had chest pain from her sternal wires which were removed.   • Onychomycosis of fingernails 3/14/2016    Patient has had onychomycosis involving her fingernails for several years.  She tries to control it with Ertaczo cream.   • Osteopenia of multiple sites 6/1/2001     01/31/2011--DEXA scan  revealed LS spine T score -2.2, left hip -1.0.  Now osteopenia.   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   • Permanent atrial fibrillation (CMS/HCC) 11/5/2020   • Primary osteoarthritis of both knees 9/28/2016 09/28/2016--patient seen in follow-up and continues to have bilateral knee discomfort particularly with prolonged use.  Right knee is worse than the left.  She understandably does not want have a knee replacement but I think she would benefit from orthopedic evaluation for consideration of cortisone or visco supplementation.  Patient referred to Dr. Wallace.  08/26/2016--x-ray of the right knee reveals very severe degenerative changes involving the medial compartment with marked joint space narrowing and subchondral sclerosis and marginal spurring.  No joint effusion and no acute pathology.   • Statin intolerance 3/28/2017    Intolerance to multiple statins.   • Status post catheter ablation of atrial flutter, 2002--successful catheter ablation for persistent atrial flutter. 2003 2003--catheter ablation for persistent atrial flutter was successful.   • Subclinical hypothyroidism 5/9/2013    Diagnosed 05/16/2013.   05/09/2013--ultrasound thyroid for new hypothyroidism revealed a small thyroid gland consistent with hypothyroidism.  Mixed nodule present.   • Vitamin D deficiency 3/14/2016         Past Surgical History:   Procedure Laterality Date   • CARDIAC ABLATION  07/02/2003 07/02/2003--catheter ablation for persistent atrial flutter was successful.   • CARDIAC PACEMAKER PLACEMENT  12/18/2000 12/18/2000--dual-chamber pacemaker placement for sick sinus syndrome, atrial flutter, AV block that occurred after her bypass surgery.   • CARPAL TUNNEL RELEASE Right 01/2010 January 2010--status post right carpal tunnel release. No surgical treatment on left.   • CORONARY ARTERY BYPASS GRAFT  12/2000 December 2000--coronary artery bypass x 5   • HX OVARIAN  CYSTECTOMY  Remote    Remote ovarian cyst excision.   • INCISIONAL BREAST BIOPSY  Remote    Benign   • PACEMAKER REPLACEMENT  10/26/2006    10/26/2006--status post pacemaker replacement. Medtronic EnRhythm [id number]    • PACEMAKER REPLACEMENT  11/30/2015 11/30/2015--pacemaker generator change.         Allergies   Allergen Reactions   • Atorvastatin    • Levothyroxine Sodium    • Other      Seafood   • Penicillins            Current Outpatient Medications:   •  Cholecalciferol (VITAMIN D) 1000 UNITS tablet, Take 1 tablet by mouth daily., Disp: , Rfl:   •  clotrimazole-betamethasone (LOTRISONE) 1-0.05 % cream, Apply to affected areas 3 times daily as directed, Disp: 45 g, Rfl: 2  •  Coenzyme Q10 (COQ10) 400 MG capsule, Take 1 capsule by mouth daily. With food, Disp: , Rfl:   •  doxazosin (CARDURA) 2 MG tablet, TAKE ONE TABLET BY MOUTH DAILY FOR HIGH BLOOD PRESSURE, Disp: 90 tablet, Rfl: 2  •  Eliquis 2.5 MG tablet tablet, TAKE ONE TABLET BY MOUTH EVERY 12 HOURS, Disp: 60 tablet, Rfl: 0  •  metoprolol succinate XL (TOPROL-XL) 50 MG 24 hr tablet, TAKE ONE TABLET BY MOUTH EVERY MORNING FOR HIGH BLOOD PRESSURE AND HEART, Disp: 90 tablet, Rfl: 2  •  Red Yeast Rice 600 MG tablet, Take 2 tablets by mouth every day., Disp: , Rfl:   •  valsartan-hydrochlorothiazide (DIOVAN-HCT) 160-12.5 MG per tablet, TAKE ONE TABLET BY MOUTH EVERY MORNING FOR HIGH BLOOD PRESSURE, Disp: 90 tablet, Rfl: 2      Family History   Problem Relation Age of Onset   • Hyperlipidemia Mother    • Breast cancer Sister    • Diabetes type II Sister          Social History     Socioeconomic History   • Marital status:      Spouse name: Not on file   • Number of children: 2   • Years of education: Not on file   • Highest education level: 12th grade   Occupational History   • Occupation: Retired   Social Needs   • Financial resource strain: Not hard at all   • Food insecurity     Worry: Never true     Inability: Never true   • Transportation needs      "Medical: No     Non-medical: No   Tobacco Use   • Smoking status: Never Smoker   • Smokeless tobacco: Never Used   Substance and Sexual Activity   • Alcohol use: No     Frequency: Never     Binge frequency: Never   • Drug use: No   • Sexual activity: Not Currently     Partners: Male   Lifestyle   • Physical activity     Days per week: 0 days     Minutes per session: 0 min   • Stress: Not at all   Relationships   • Social connections     Talks on phone: More than three times a week     Gets together: Twice a week     Attends [employer] service: More than 4 times per year     Active member of club or organization: No     Attends meetings of clubs or organizations: Never     Relationship status:          Vitals:    11/25/20 0953   BP: 120/46   BP Location: Left arm   Patient Position: Sitting   Cuff Size: Adult   Pulse: 60   SpO2: 98%   Weight: 54.3 kg (119 lb 9.6 oz)   Height: 152.4 cm (60\")        Body mass index is 23.36 kg/m².      Physical Exam:    General: Alert and oriented x 3.  No acute distress.  Normal affect.  HEENT: Pupils equal, round, reactive to light; extraocular movements intact; sclerae nonicteric; pharynx, ear canals and TMs normal.  Neck: Without JVD, thyromegaly, bruit, or adenopathy.  Lungs: Clear to auscultation in all fields.  Heart: Regular rate and rhythm today without murmur, rub, gallop, or click.  Abdomen: Soft, nontender, without hepatosplenomegaly or hernia.  Bowel sounds normal.  : Deferred.  Rectal: Deferred.  Extremities: Without clubbing, cyanosis, edema, or pulse deficit.  Neurologic: Intact without focal deficit.  Normal station and gait observed during ingress and egress from the examination room.  Skin: Without significant lesion.  Musculoskeletal: Unremarkable.    Lab/other results:    NMR reveals a total cholesterol of 238.  Triglycerides 144.  LDL particle number elevated 1904.  Small LDL particle number elevated at 554.  HDL particle number normal at 32.8.  CMP normal " except glucose 136, serum sodium low at 133.  Hemoglobin A1c 6.0.  CBC is normal.  Thyroid function test normal.  Vitamin D normal at 39.1.    Assessment/Plan:     Diagnosis Plan   1. Impaired fasting glucose  Comprehensive Metabolic Panel    Hemoglobin A1c   2. Hyperlipidemia  Comprehensive Metabolic Panel    NMR LipoProfile   3. Statin intolerance     4. Benign essential hypertension     5. Subclinical hypothyroidism  TSH    T4, Free    T3, Free   6. Occlusive coronary artery disease, 12/11/2000--status post 5 vessel CABG     7. History of atrial flutter, 2003--successful catheter ablation for persistent atrial flutter.     8. Permanent atrial fibrillation (CMS/[facility])     9. History of permanent cardiac PM, 2000--dual-chamber PM for SSS, A flutter, AVB after CABG. 2006--PM replacement.  Medtronic EnRhythm [id number]. 11/30/2015--PM generator change.     10. Moderate mitral regurgitation, 02/18/2012--moderate MR, EF 58%.     11. Mitral valve annular calcification     12. Chronic anticoagulation, Eliquis for paroxysmal atrial fibrillation     13. Generalized osteoarthritis of multiple sites     14. Osteopenia of multiple sites     15. Therapeutic drug monitoring  CBC (No Diff)   16. Vitamin D deficiency  Vitamin D 25 Hydroxy     Patient has mild impaired fasting glucose that does not require medication.  Recommend low carbohydrate diet.  Hyperlipidemia is under the best control but we can get it given the fact that patient is statin intolerant.  This is unfortunate given her occlusive coronary artery disease.  Her blood pressure is well controlled.  There is no evidence of hypothyroidism on today's labs but we will continue to monitor.  Her cardiac status seems stable and she is followed by the cardiologist.  She does have permanent atrial fibrillation and also has a cardiac pacemaker which is checked regularly by the cardiology service.  She also has moderate mitral regurgitation and annular calcification which seems  stable.  She is on chronic anticoagulation with Eliquis and tolerating it well without signs or symptoms of bleeding.  Her generalized osteoarthritis symptoms are tolerable.  She cannot take nonsteroidals due to the anticoagulation.  She has osteopenia and we have elected not to do any further DEXA scans.    Plan is as follows: No change in current medical regimen.  Low carbohydrate diet.  Patient will follow-up after August 5, 2021 with lab prior and this will also be subsequent Medicare wellness visit.  Otherwise, she will follow-up as needed.      Procedures

## 2021-01-01 ENCOUNTER — LAB (OUTPATIENT)
Dept: LAB | Facility: HOSPITAL | Age: 86
End: 2021-01-01

## 2021-01-01 ENCOUNTER — HOSPITAL ENCOUNTER (INPATIENT)
Facility: HOSPITAL | Age: 86
LOS: 2 days | Discharge: HOME | End: 2021-02-26
Attending: EMERGENCY MEDICINE | Admitting: HOSPITALIST

## 2021-01-01 ENCOUNTER — TELEPHONE (OUTPATIENT)
Dept: INTERNAL MEDICINE | Facility: CLINIC | Age: 86
End: 2021-01-01

## 2021-01-01 ENCOUNTER — OFFICE VISIT (OUTPATIENT)
Dept: INTERNAL MEDICINE | Facility: CLINIC | Age: 86
End: 2021-01-01

## 2021-01-01 ENCOUNTER — APPOINTMENT (OUTPATIENT)
Dept: GENERAL RADIOLOGY | Facility: HOSPITAL | Age: 86
End: 2021-01-01

## 2021-01-01 ENCOUNTER — READMISSION MANAGEMENT (OUTPATIENT)
Dept: CALL CENTER | Facility: HOSPITAL | Age: 86
End: 2021-01-01

## 2021-01-01 ENCOUNTER — TRANSITIONAL CARE MANAGEMENT TELEPHONE ENCOUNTER (OUTPATIENT)
Dept: CALL CENTER | Facility: HOSPITAL | Age: 86
End: 2021-01-01

## 2021-01-01 ENCOUNTER — APPOINTMENT (OUTPATIENT)
Dept: CT IMAGING | Facility: HOSPITAL | Age: 86
End: 2021-01-01

## 2021-01-01 ENCOUNTER — HOSPITAL ENCOUNTER (EMERGENCY)
Facility: HOSPITAL | Age: 86
Discharge: HOME | End: 2021-03-10
Attending: EMERGENCY MEDICINE | Admitting: EMERGENCY MEDICINE

## 2021-01-01 ENCOUNTER — HOSPITAL ENCOUNTER (EMERGENCY)
Facility: HOSPITAL | Age: 86
Discharge: HOME | End: 2021-03-19
Attending: EMERGENCY MEDICINE | Admitting: EMERGENCY MEDICINE

## 2021-01-01 ENCOUNTER — PATIENT OUTREACH (OUTPATIENT)
Dept: CASE MANAGEMENT | Facility: OTHER | Age: 86
End: 2021-01-01

## 2021-01-01 VITALS
HEIGHT: 60 IN | WEIGHT: 95 LBS | BODY MASS INDEX: 18.65 KG/M2 | RESPIRATION RATE: 16 BRPM | TEMPERATURE: 98 F | DIASTOLIC BLOOD PRESSURE: 90 MMHG | HEART RATE: 73 BPM | OXYGEN SATURATION: 96 % | SYSTOLIC BLOOD PRESSURE: 146 MMHG

## 2021-01-01 VITALS
SYSTOLIC BLOOD PRESSURE: 164 MMHG | DIASTOLIC BLOOD PRESSURE: 54 MMHG | BODY MASS INDEX: 21.48 KG/M2 | OXYGEN SATURATION: 98 % | HEART RATE: 60 BPM | HEIGHT: 60 IN

## 2021-01-01 VITALS
HEIGHT: 60 IN | DIASTOLIC BLOOD PRESSURE: 63 MMHG | WEIGHT: 110 LBS | OXYGEN SATURATION: 96 % | SYSTOLIC BLOOD PRESSURE: 173 MMHG | TEMPERATURE: 96.4 F | HEART RATE: 60 BPM | BODY MASS INDEX: 21.6 KG/M2 | RESPIRATION RATE: 16 BRPM

## 2021-01-01 VITALS — OXYGEN SATURATION: 95 % | HEART RATE: 57 BPM | BODY MASS INDEX: 23.44 KG/M2 | HEIGHT: 60 IN

## 2021-01-01 VITALS
SYSTOLIC BLOOD PRESSURE: 156 MMHG | WEIGHT: 120 LBS | TEMPERATURE: 97.6 F | RESPIRATION RATE: 18 BRPM | HEIGHT: 60 IN | DIASTOLIC BLOOD PRESSURE: 68 MMHG | HEART RATE: 64 BPM | BODY MASS INDEX: 23.56 KG/M2 | OXYGEN SATURATION: 95 %

## 2021-01-01 VITALS
WEIGHT: 114.64 LBS | TEMPERATURE: 97.1 F | HEART RATE: 60 BPM | BODY MASS INDEX: 24.06 KG/M2 | HEIGHT: 58 IN | RESPIRATION RATE: 16 BRPM | SYSTOLIC BLOOD PRESSURE: 168 MMHG | DIASTOLIC BLOOD PRESSURE: 70 MMHG | OXYGEN SATURATION: 92 %

## 2021-01-01 DIAGNOSIS — M85.89 OSTEOPENIA OF MULTIPLE SITES: Chronic | ICD-10-CM

## 2021-01-01 DIAGNOSIS — E55.9 VITAMIN D DEFICIENCY: Chronic | ICD-10-CM

## 2021-01-01 DIAGNOSIS — E55.9 VITAMIN D DEFICIENCY: ICD-10-CM

## 2021-01-01 DIAGNOSIS — E78.2 MIXED HYPERLIPIDEMIA: Chronic | ICD-10-CM

## 2021-01-01 DIAGNOSIS — R53.1 GENERALIZED WEAKNESS: ICD-10-CM

## 2021-01-01 DIAGNOSIS — Z51.81 THERAPEUTIC DRUG MONITORING: ICD-10-CM

## 2021-01-01 DIAGNOSIS — Z79.01 CHRONIC ANTICOAGULATION: ICD-10-CM

## 2021-01-01 DIAGNOSIS — I10 BENIGN ESSENTIAL HYPERTENSION: Chronic | ICD-10-CM

## 2021-01-01 DIAGNOSIS — E03.9 PRIMARY HYPOTHYROIDISM: ICD-10-CM

## 2021-01-01 DIAGNOSIS — Z86.79 HISTORY OF ATRIAL FLUTTER: Chronic | ICD-10-CM

## 2021-01-01 DIAGNOSIS — Z86.79 HX OF ATRIAL FLUTTER: ICD-10-CM

## 2021-01-01 DIAGNOSIS — I34.81 MITRAL VALVE ANNULAR CALCIFICATION: Chronic | ICD-10-CM

## 2021-01-01 DIAGNOSIS — E87.1 HYPONATREMIA: ICD-10-CM

## 2021-01-01 DIAGNOSIS — I48.21 PERMANENT ATRIAL FIBRILLATION (HCC): ICD-10-CM

## 2021-01-01 DIAGNOSIS — Z78.9 STATIN INTOLERANCE: Chronic | ICD-10-CM

## 2021-01-01 DIAGNOSIS — I48.21 PERMANENT ATRIAL FIBRILLATION (HCC): Chronic | ICD-10-CM

## 2021-01-01 DIAGNOSIS — R60.0 BILATERAL LOWER EXTREMITY EDEMA: ICD-10-CM

## 2021-01-01 DIAGNOSIS — Z95.0 HISTORY OF PERMANENT CARDIAC PACEMAKER PLACEMENT: Chronic | ICD-10-CM

## 2021-01-01 DIAGNOSIS — Z79.01 CHRONIC ANTICOAGULATION: Chronic | ICD-10-CM

## 2021-01-01 DIAGNOSIS — F41.1 GENERALIZED ANXIETY DISORDER: ICD-10-CM

## 2021-01-01 DIAGNOSIS — R82.90 BAD ODOR OF URINE: ICD-10-CM

## 2021-01-01 DIAGNOSIS — M15.9 GENERALIZED OSTEOARTHRITIS OF MULTIPLE SITES: Chronic | ICD-10-CM

## 2021-01-01 DIAGNOSIS — E03.8 SUBCLINICAL HYPOTHYROIDISM: Chronic | ICD-10-CM

## 2021-01-01 DIAGNOSIS — S01.01XA LACERATION OF SCALP, INITIAL ENCOUNTER: ICD-10-CM
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
## 2021-01-01 DIAGNOSIS — S80.12XA CONTUSION OF LEFT LOWER LEG, INITIAL ENCOUNTER: ICD-10-CM

## 2021-01-01 DIAGNOSIS — R30.0 DYSURIA: ICD-10-CM

## 2021-01-01 DIAGNOSIS — Z87.19 HISTORY OF DIVERTICULITIS OF COLON: Primary | ICD-10-CM

## 2021-01-01 DIAGNOSIS — R73.01 IMPAIRED FASTING GLUCOSE: Chronic | ICD-10-CM

## 2021-01-01 DIAGNOSIS — Z51.5 END OF LIFE CARE: Primary | ICD-10-CM

## 2021-01-01 DIAGNOSIS — F51.09 SITUATIONAL INSOMNIA: Primary | ICD-10-CM

## 2021-01-01 DIAGNOSIS — E87.6 HYPOKALEMIA: ICD-10-CM

## 2021-01-01 DIAGNOSIS — Z79.01 CURRENT USE OF LONG TERM ANTICOAGULATION: ICD-10-CM

## 2021-01-01 DIAGNOSIS — R41.0 TRANSIENT CONFUSION: Primary | ICD-10-CM

## 2021-01-01 DIAGNOSIS — R53.1 WEAKNESS: ICD-10-CM

## 2021-01-01 DIAGNOSIS — F41.8 SITUATIONAL ANXIETY: ICD-10-CM

## 2021-01-01 DIAGNOSIS — K57.92 ACUTE DIVERTICULITIS: Primary | ICD-10-CM

## 2021-01-01 DIAGNOSIS — I25.10 OCCLUSIVE CORONARY ARTERY DISEASE: Chronic | ICD-10-CM

## 2021-01-01 DIAGNOSIS — R73.01 IMPAIRED FASTING GLUCOSE: Primary | Chronic | ICD-10-CM

## 2021-01-01 DIAGNOSIS — I25.10 OCCLUSIVE CORONARY ARTERY DISEASE: Primary | Chronic | ICD-10-CM

## 2021-01-01 DIAGNOSIS — S81.812A SKIN TEAR OF LOWER LEG WITHOUT COMPLICATION, LEFT, INITIAL ENCOUNTER: Primary | ICD-10-CM

## 2021-01-01 LAB
25(OH)D3 SERPL-MCNC: 42 NG/ML
ALBUMIN SERPL-MCNC: 3 G/DL (ref 3.5–5.2)
ALBUMIN SERPL-MCNC: 3.2 G/DL (ref 3.5–5.2)
ALBUMIN SERPL-MCNC: 3.4 G/DL (ref 3.5–5.2)
ALBUMIN SERPL-MCNC: 3.4 G/DL (ref 3.5–5.2)
ALBUMIN SERPL-MCNC: 3.5 G/DL (ref 3.5–5.2)
ALBUMIN/GLOB SERPL: 1.2 G/DL
ALBUMIN/GLOB SERPL: 1.2 G/DL
ALBUMIN/GLOB SERPL: 1.3 G/DL
ALBUMIN/GLOB SERPL: 1.3 G/DL
ALBUMIN/GLOB SERPL: 1.4 G/DL
ALP SERPL-CCNC: 57 U/L (ref 39–117)
ALP SERPL-CCNC: 63 U/L (ref 39–117)
ALP SERPL-CCNC: 66 U/L (ref 39–117)
ALP SERPL-CCNC: 72 U/L (ref 39–117)
ALP SERPL-CCNC: 79 U/L (ref 39–117)
ALT SERPL W P-5'-P-CCNC: 11 U/L (ref 1–33)
ALT SERPL W P-5'-P-CCNC: 12 U/L (ref 1–33)
ALT SERPL W P-5'-P-CCNC: 12 U/L (ref 1–33)
ALT SERPL W P-5'-P-CCNC: 7 U/L (ref 1–33)
ALT SERPL W P-5'-P-CCNC: 9 U/L (ref 1–33)
AMORPH URATE CRY URNS QL MICRO: ABNORMAL /HPF
ANION GAP SERPL CALCULATED.3IONS-SCNC: 11 MMOL/L (ref 5–15)
ANION GAP SERPL CALCULATED.3IONS-SCNC: 11 MMOL/L (ref 5–15)
ANION GAP SERPL CALCULATED.3IONS-SCNC: 11.6 MMOL/L (ref 5–15)
ANION GAP SERPL CALCULATED.3IONS-SCNC: 8.4 MMOL/L (ref 5–15)
ANION GAP SERPL CALCULATED.3IONS-SCNC: 9.2 MMOL/L (ref 5–15)
ANION GAP SERPL CALCULATED.3IONS-SCNC: 9.3 MMOL/L (ref 5–15)
ANION GAP SERPL CALCULATED.3IONS-SCNC: 9.5 MMOL/L (ref 5–15)
AST SERPL-CCNC: 10 U/L (ref 1–32)
AST SERPL-CCNC: 11 U/L (ref 1–32)
AST SERPL-CCNC: 12 U/L (ref 1–32)
AST SERPL-CCNC: 13 U/L (ref 1–32)
AST SERPL-CCNC: 13 U/L (ref 1–32)
BACTERIA UR CULT: ABNORMAL
BACTERIA UR CULT: ABNORMAL
BACTERIA UR QL AUTO: ABNORMAL /HPF
BASOPHILS # BLD AUTO: 0.03 10*3/MM3 (ref 0–0.2)
BASOPHILS # BLD AUTO: 0.03 10*3/MM3 (ref 0–0.2)
BASOPHILS NFR BLD AUTO: 0.3 % (ref 0–1.5)
BASOPHILS NFR BLD AUTO: 0.3 % (ref 0–1.5)
BILIRUB BLD-MCNC: NEGATIVE MG/DL
BILIRUB SERPL-MCNC: 0.3 MG/DL (ref 0–1.2)
BILIRUB SERPL-MCNC: 0.4 MG/DL (ref 0–1.2)
BILIRUB SERPL-MCNC: 0.7 MG/DL (ref 0–1.2)
BILIRUB UR QL STRIP: NEGATIVE
BILIRUB UR QL STRIP: NEGATIVE
BUN SERPL-MCNC: 12 MG/DL (ref 8–23)
BUN SERPL-MCNC: 17 MG/DL (ref 8–23)
BUN SERPL-MCNC: 6 MG/DL (ref 8–23)
BUN SERPL-MCNC: 7 MG/DL (ref 8–23)
BUN SERPL-MCNC: 7 MG/DL (ref 8–23)
BUN SERPL-MCNC: 8 MG/DL (ref 8–23)
BUN SERPL-MCNC: 9 MG/DL (ref 8–23)
BUN/CREAT SERPL: 12.2 (ref 7–25)
BUN/CREAT SERPL: 13.5 (ref 7–25)
BUN/CREAT SERPL: 14.5 (ref 7–25)
BUN/CREAT SERPL: 14.8 (ref 7–25)
BUN/CREAT SERPL: 22.2 (ref 7–25)
BUN/CREAT SERPL: 25.4 (ref 7–25)
BUN/CREAT SERPL: 9.6 (ref 7–25)
CALCIUM SPEC-SCNC: 7.8 MG/DL (ref 8.2–9.6)
CALCIUM SPEC-SCNC: 7.9 MG/DL (ref 8.2–9.6)
CALCIUM SPEC-SCNC: 8.1 MG/DL (ref 8.2–9.6)
CALCIUM SPEC-SCNC: 8.7 MG/DL (ref 8.2–9.6)
CALCIUM SPEC-SCNC: 9.1 MG/DL (ref 8.2–9.6)
CALCIUM SPEC-SCNC: 9.1 MG/DL (ref 8.2–9.6)
CALCIUM SPEC-SCNC: 9.3 MG/DL (ref 8.2–9.6)
CHLORIDE SERPL-SCNC: 89 MMOL/L (ref 98–107)
CHLORIDE SERPL-SCNC: 93 MMOL/L (ref 98–107)
CHLORIDE SERPL-SCNC: 94 MMOL/L (ref 98–107)
CHLORIDE SERPL-SCNC: 94 MMOL/L (ref 98–107)
CHLORIDE SERPL-SCNC: 96 MMOL/L (ref 98–107)
CHLORIDE SERPL-SCNC: 97 MMOL/L (ref 98–107)
CHLORIDE SERPL-SCNC: 98 MMOL/L (ref 98–107)
CHOLEST SERPL-MCNC: 220 MG/DL (ref 100–199)
CLARITY UR: ABNORMAL
CLARITY UR: ABNORMAL
CLARITY, POC: CLEAR
CO2 SERPL-SCNC: 24 MMOL/L (ref 22–29)
CO2 SERPL-SCNC: 24.5 MMOL/L (ref 22–29)
CO2 SERPL-SCNC: 24.6 MMOL/L (ref 22–29)
CO2 SERPL-SCNC: 24.7 MMOL/L (ref 22–29)
CO2 SERPL-SCNC: 25.8 MMOL/L (ref 22–29)
CO2 SERPL-SCNC: 27.4 MMOL/L (ref 22–29)
CO2 SERPL-SCNC: 28 MMOL/L (ref 22–29)
COLOR UR: YELLOW
CREAT SERPL-MCNC: 0.49 MG/DL (ref 0.57–1)
CREAT SERPL-MCNC: 0.52 MG/DL (ref 0.57–1)
CREAT SERPL-MCNC: 0.54 MG/DL (ref 0.57–1)
CREAT SERPL-MCNC: 0.54 MG/DL (ref 0.57–1)
CREAT SERPL-MCNC: 0.62 MG/DL (ref 0.57–1)
CREAT SERPL-MCNC: 0.67 MG/DL (ref 0.57–1)
CREAT SERPL-MCNC: 0.73 MG/DL (ref 0.57–1)
DEPRECATED RDW RBC AUTO: 41.9 FL (ref 37–54)
DEPRECATED RDW RBC AUTO: 42.3 FL (ref 37–54)
DEPRECATED RDW RBC AUTO: 43.8 FL (ref 37–54)
DEPRECATED RDW RBC AUTO: 44.9 FL (ref 37–54)
EOSINOPHIL # BLD AUTO: 0.03 10*3/MM3 (ref 0–0.4)
EOSINOPHIL # BLD AUTO: 0.03 10*3/MM3 (ref 0–0.4)
EOSINOPHIL NFR BLD AUTO: 0.3 % (ref 0.3–6.2)
EOSINOPHIL NFR BLD AUTO: 0.3 % (ref 0.3–6.2)
ERYTHROCYTE [DISTWIDTH] IN BLOOD BY AUTOMATED COUNT: 13.5 % (ref 12.3–15.4)
ERYTHROCYTE [DISTWIDTH] IN BLOOD BY AUTOMATED COUNT: 13.8 % (ref 12.3–15.4)
ERYTHROCYTE [DISTWIDTH] IN BLOOD BY AUTOMATED COUNT: 13.8 % (ref 12.3–15.4)
ERYTHROCYTE [DISTWIDTH] IN BLOOD BY AUTOMATED COUNT: 13.9 % (ref 12.3–15.4)
ERYTHROCYTE [DISTWIDTH] IN BLOOD BY AUTOMATED COUNT: 14.5 % (ref 12.3–15.4)
ERYTHROCYTE [DISTWIDTH] IN BLOOD BY AUTOMATED COUNT: 14.9 % (ref 12.3–15.4)
GFR SERPL CREATININE-BSD FRML MDRD: 105 ML/MIN/1.73
GFR SERPL CREATININE-BSD FRML MDRD: 105 ML/MIN/1.73
GFR SERPL CREATININE-BSD FRML MDRD: 110 ML/MIN/1.73
GFR SERPL CREATININE-BSD FRML MDRD: 118 ML/MIN/1.73
GFR SERPL CREATININE-BSD FRML MDRD: 74 ML/MIN/1.73
GFR SERPL CREATININE-BSD FRML MDRD: 82 ML/MIN/1.73
GFR SERPL CREATININE-BSD FRML MDRD: 90 ML/MIN/1.73
GLOBULIN UR ELPH-MCNC: 2.3 GM/DL
GLOBULIN UR ELPH-MCNC: 2.4 GM/DL
GLOBULIN UR ELPH-MCNC: 2.7 GM/DL
GLOBULIN UR ELPH-MCNC: 2.7 GM/DL
GLOBULIN UR ELPH-MCNC: 2.8 GM/DL
GLUCOSE SERPL-MCNC: 125 MG/DL (ref 65–99)
GLUCOSE SERPL-MCNC: 137 MG/DL (ref 65–99)
GLUCOSE SERPL-MCNC: 137 MG/DL (ref 65–99)
GLUCOSE SERPL-MCNC: 152 MG/DL (ref 65–99)
GLUCOSE SERPL-MCNC: 93 MG/DL (ref 65–99)
GLUCOSE SERPL-MCNC: 94 MG/DL (ref 65–99)
GLUCOSE SERPL-MCNC: 97 MG/DL (ref 65–99)
GLUCOSE UR STRIP-MCNC: NEGATIVE MG/DL
HBA1C MFR BLD: 5.87 % (ref 4.8–5.6)
HCT VFR BLD AUTO: 30.8 % (ref 34–46.6)
HCT VFR BLD AUTO: 33.2 % (ref 34–46.6)
HCT VFR BLD AUTO: 34.2 % (ref 34–46.6)
HCT VFR BLD AUTO: 34.8 % (ref 34–46.6)
HCT VFR BLD AUTO: 35.5 % (ref 34–46.6)
HCT VFR BLD AUTO: 35.7 % (ref 34–46.6)
HDL SERPL-SCNC: 27.6 UMOL/L
HDLC SERPL-MCNC: 51 MG/DL
HGB BLD-MCNC: 10.2 G/DL (ref 12–15.9)
HGB BLD-MCNC: 11.4 G/DL (ref 12–15.9)
HGB BLD-MCNC: 11.5 G/DL (ref 12–15.9)
HGB BLD-MCNC: 11.7 G/DL (ref 12–15.9)
HGB BLD-MCNC: 11.9 G/DL (ref 12–15.9)
HGB BLD-MCNC: 12.1 G/DL (ref 12–15.9)
HGB UR QL STRIP.AUTO: ABNORMAL
HGB UR QL STRIP.AUTO: NEGATIVE
HOLD SPECIMEN: NORMAL
HOLD SPECIMEN: NORMAL
HYALINE CASTS UR QL AUTO: ABNORMAL /LPF
IMM GRANULOCYTES # BLD AUTO: 0.04 10*3/MM3 (ref 0–0.05)
IMM GRANULOCYTES # BLD AUTO: 0.08 10*3/MM3 (ref 0–0.05)
IMM GRANULOCYTES NFR BLD AUTO: 0.4 % (ref 0–0.5)
IMM GRANULOCYTES NFR BLD AUTO: 0.9 % (ref 0–0.5)
KETONES UR QL STRIP: ABNORMAL
KETONES UR QL STRIP: ABNORMAL
KETONES UR QL: NEGATIVE
LDL SERPL QN: 21.6 NM
LDL SERPL-SCNC: 1613 NMOL/L
LDL SMALL SERPL-SCNC: 406 NMOL/L
LDLC SERPL CALC-MCNC: 142 MG/DL (ref 0–99)
LEUKOCYTE EST, POC: ABNORMAL
LEUKOCYTE ESTERASE UR QL STRIP.AUTO: ABNORMAL
LEUKOCYTE ESTERASE UR QL STRIP.AUTO: NEGATIVE
LYMPHOCYTES # BLD AUTO: 0.92 10*3/MM3 (ref 0.7–3.1)
LYMPHOCYTES # BLD AUTO: 0.94 10*3/MM3 (ref 0.7–3.1)
LYMPHOCYTES NFR BLD AUTO: 10.2 % (ref 19.6–45.3)
LYMPHOCYTES NFR BLD AUTO: 10.7 % (ref 19.6–45.3)
Lab: NORMAL
MAGNESIUM SERPL-MCNC: 1.5 MG/DL (ref 1.7–2.3)
MAGNESIUM SERPL-MCNC: 2 MG/DL (ref 1.7–2.3)
MCH RBC QN AUTO: 26.6 PG (ref 26.6–33)
MCH RBC QN AUTO: 28.2 PG (ref 26.6–33)
MCH RBC QN AUTO: 28.8 PG (ref 26.6–33)
MCH RBC QN AUTO: 29 PG (ref 26.6–33)
MCH RBC QN AUTO: 29.2 PG (ref 26.6–33)
MCH RBC QN AUTO: 29.4 PG (ref 26.6–33)
MCHC RBC AUTO-ENTMCNC: 32.2 G/DL (ref 31.5–35.7)
MCHC RBC AUTO-ENTMCNC: 33.1 G/DL (ref 31.5–35.7)
MCHC RBC AUTO-ENTMCNC: 33.5 G/DL (ref 31.5–35.7)
MCHC RBC AUTO-ENTMCNC: 34.2 G/DL (ref 31.5–35.7)
MCHC RBC AUTO-ENTMCNC: 34.3 G/DL (ref 31.5–35.7)
MCHC RBC AUTO-ENTMCNC: 34.8 G/DL (ref 31.5–35.7)
MCV RBC AUTO: 82.4 FL (ref 79–97)
MCV RBC AUTO: 84.5 FL (ref 79–97)
MCV RBC AUTO: 84.9 FL (ref 79–97)
MCV RBC AUTO: 84.9 FL (ref 79–97)
MCV RBC AUTO: 85.1 FL (ref 79–97)
MCV RBC AUTO: 86 FL (ref 79–97)
MONOCYTES # BLD AUTO: 0.62 10*3/MM3 (ref 0.1–0.9)
MONOCYTES # BLD AUTO: 0.73 10*3/MM3 (ref 0.1–0.9)
MONOCYTES NFR BLD AUTO: 7.2 % (ref 5–12)
MONOCYTES NFR BLD AUTO: 7.9 % (ref 5–12)
NEUTROPHILS NFR BLD AUTO: 6.93 10*3/MM3 (ref 1.7–7)
NEUTROPHILS NFR BLD AUTO: 7.44 10*3/MM3 (ref 1.7–7)
NEUTROPHILS NFR BLD AUTO: 80.6 % (ref 42.7–76)
NEUTROPHILS NFR BLD AUTO: 80.9 % (ref 42.7–76)
NITRITE UR QL STRIP: NEGATIVE
NITRITE UR QL STRIP: NEGATIVE
NITRITE UR-MCNC: POSITIVE MG/ML
NRBC BLD AUTO-RTO: 0 /100 WBC (ref 0–0.2)
NRBC BLD AUTO-RTO: 0 /100 WBC (ref 0–0.2)
OTHER ANTIBIOTIC SUSC ISLT: ABNORMAL
PH UR STRIP.AUTO: 8 [PH] (ref 5–8)
PH UR STRIP.AUTO: 8 [PH] (ref 5–8)
PH UR: 7.5 [PH] (ref 5–8)
PLATELET # BLD AUTO: 253 10*3/MM3 (ref 140–450)
PLATELET # BLD AUTO: 292 10*3/MM3 (ref 140–450)
PLATELET # BLD AUTO: 334 10*3/MM3 (ref 140–450)
PLATELET # BLD AUTO: 341 10*3/MM3 (ref 140–450)
PLATELET # BLD AUTO: 349 10*3/MM3 (ref 140–450)
PLATELET # BLD AUTO: 363 10*3/MM3 (ref 140–450)
PMV BLD AUTO: 8.6 FL (ref 6–12)
PMV BLD AUTO: 8.8 FL (ref 6–12)
PMV BLD AUTO: 9 FL (ref 6–12)
PMV BLD AUTO: 9.2 FL (ref 6–12)
POTASSIUM SERPL-SCNC: 3.3 MMOL/L (ref 3.5–5.2)
POTASSIUM SERPL-SCNC: 3.3 MMOL/L (ref 3.5–5.2)
POTASSIUM SERPL-SCNC: 3.4 MMOL/L (ref 3.5–5.2)
POTASSIUM SERPL-SCNC: 3.5 MMOL/L (ref 3.5–5.2)
POTASSIUM SERPL-SCNC: 3.5 MMOL/L (ref 3.5–5.2)
POTASSIUM SERPL-SCNC: 4.2 MMOL/L (ref 3.5–5.2)
POTASSIUM SERPL-SCNC: 4.3 MMOL/L (ref 3.5–5.2)
PROT SERPL-MCNC: 5.3 G/DL (ref 6–8.5)
PROT SERPL-MCNC: 5.8 G/DL (ref 6–8.5)
PROT SERPL-MCNC: 5.9 G/DL (ref 6–8.5)
PROT SERPL-MCNC: 6.2 G/DL (ref 6–8.5)
PROT SERPL-MCNC: 6.2 G/DL (ref 6–8.5)
PROT UR QL STRIP: ABNORMAL
PROT UR QL STRIP: ABNORMAL
PROT UR STRIP-MCNC: ABNORMAL MG/DL
QT INTERVAL: 492 MS
RBC # BLD AUTO: 3.62 10*6/MM3 (ref 3.77–5.28)
RBC # BLD AUTO: 3.91 10*6/MM3 (ref 3.77–5.28)
RBC # BLD AUTO: 4.03 10*6/MM3 (ref 3.77–5.28)
RBC # BLD AUTO: 4.12 10*6/MM3 (ref 3.77–5.28)
RBC # BLD AUTO: 4.13 10*6/MM3 (ref 3.77–5.28)
RBC # BLD AUTO: 4.33 10*6/MM3 (ref 3.77–5.28)
RBC # UR STRIP: ABNORMAL /UL
RBC # UR: ABNORMAL /HPF
REF LAB TEST METHOD: ABNORMAL
SARS-COV-2 ORF1AB RESP QL NAA+PROBE: NOT DETECTED
SODIUM SERPL-SCNC: 128 MMOL/L (ref 136–145)
SODIUM SERPL-SCNC: 128 MMOL/L (ref 136–145)
SODIUM SERPL-SCNC: 129 MMOL/L (ref 136–145)
SODIUM SERPL-SCNC: 130 MMOL/L (ref 136–145)
SODIUM SERPL-SCNC: 131 MMOL/L (ref 136–145)
SODIUM SERPL-SCNC: 132 MMOL/L (ref 136–145)
SODIUM SERPL-SCNC: 132 MMOL/L (ref 136–145)
SP GR UR STRIP: 1.02 (ref 1–1.03)
SP GR UR STRIP: 1.02 (ref 1–1.03)
SP GR UR: 1.01 (ref 1–1.03)
SQUAMOUS #/AREA URNS HPF: ABNORMAL /HPF
T3FREE SERPL-MCNC: 2.32 PG/ML (ref 2–4.4)
T3FREE SERPL-MCNC: 2.51 PG/ML (ref 2–4.4)
T4 FREE SERPL-MCNC: 1.36 NG/DL (ref 0.93–1.7)
T4 FREE SERPL-MCNC: 1.54 NG/DL (ref 0.93–1.7)
TRIGL SERPL-MCNC: 149 MG/DL (ref 0–149)
TROPONIN T SERPL-MCNC: <0.01 NG/ML (ref 0–0.03)
TSH SERPL DL<=0.05 MIU/L-ACNC: 2.15 UIU/ML (ref 0.27–4.2)
TSH SERPL DL<=0.05 MIU/L-ACNC: 5.13 UIU/ML (ref 0.27–4.2)
UROBILINOGEN UR QL STRIP: ABNORMAL
UROBILINOGEN UR QL STRIP: ABNORMAL
UROBILINOGEN UR QL: NORMAL
WBC # BLD AUTO: 11.31 10*3/MM3 (ref 3.4–10.8)
WBC # BLD AUTO: 7.51 10*3/MM3 (ref 3.4–10.8)
WBC # BLD AUTO: 7.58 10*3/MM3 (ref 3.4–10.8)
WBC # BLD AUTO: 7.58 10*3/MM3 (ref 3.4–10.8)
WBC # BLD AUTO: 8.61 10*3/MM3 (ref 3.4–10.8)
WBC # BLD AUTO: 9.21 10*3/MM3 (ref 3.4–10.8)
WBC UR QL AUTO: ABNORMAL /HPF
WHOLE BLOOD HOLD SPECIMEN: NORMAL
WHOLE BLOOD HOLD SPECIMEN: NORMAL

## 2021-01-01 PROCEDURE — 71045 X-RAY EXAM CHEST 1 VIEW: CPT

## 2021-01-01 PROCEDURE — 82306 VITAMIN D 25 HYDROXY: CPT

## 2021-01-01 PROCEDURE — 36415 COLL VENOUS BLD VENIPUNCTURE: CPT | Performed by: INTERNAL MEDICINE

## 2021-01-01 PROCEDURE — 84481 FREE ASSAY (FT-3): CPT

## 2021-01-01 PROCEDURE — U0005 INFEC AGEN DETEC AMPLI PROBE: HCPCS | Performed by: NURSE PRACTITIONER

## 2021-01-01 PROCEDURE — 83735 ASSAY OF MAGNESIUM: CPT | Performed by: HOSPITALIST

## 2021-01-01 PROCEDURE — P9612 CATHETERIZE FOR URINE SPEC: HCPCS

## 2021-01-01 PROCEDURE — 99214 OFFICE O/P EST MOD 30 MIN: CPT | Performed by: INTERNAL MEDICINE

## 2021-01-01 PROCEDURE — 85027 COMPLETE CBC AUTOMATED: CPT | Performed by: INTERNAL MEDICINE

## 2021-01-01 PROCEDURE — 25010000002 MAGNESIUM SULFATE 2 GM/50ML SOLUTION: Performed by: HOSPITALIST

## 2021-01-01 PROCEDURE — 70450 CT HEAD/BRAIN W/O DYE: CPT

## 2021-01-01 PROCEDURE — 94799 UNLISTED PULMONARY SVC/PX: CPT

## 2021-01-01 PROCEDURE — 85027 COMPLETE CBC AUTOMATED: CPT | Performed by: HOSPITALIST

## 2021-01-01 PROCEDURE — G0378 HOSPITAL OBSERVATION PER HR: HCPCS

## 2021-01-01 PROCEDURE — 74022 RADEX COMPL AQT ABD SERIES: CPT | Performed by: FAMILY MEDICINE

## 2021-01-01 PROCEDURE — 97535 SELF CARE MNGMENT TRAINING: CPT

## 2021-01-01 PROCEDURE — 85025 COMPLETE CBC W/AUTO DIFF WBC: CPT | Performed by: EMERGENCY MEDICINE

## 2021-01-01 PROCEDURE — 97110 THERAPEUTIC EXERCISES: CPT

## 2021-01-01 PROCEDURE — U0004 COV-19 TEST NON-CDC HGH THRU: HCPCS | Performed by: NURSE PRACTITIONER

## 2021-01-01 PROCEDURE — 25810000003 SODIUM CHLORIDE 0.9 % WITH KCL 20 MEQ 20-0.9 MEQ/L-% SOLUTION: Performed by: HOSPITALIST

## 2021-01-01 PROCEDURE — 97161 PT EVAL LOW COMPLEX 20 MIN: CPT

## 2021-01-01 PROCEDURE — 99284 EMERGENCY DEPT VISIT MOD MDM: CPT

## 2021-01-01 PROCEDURE — 85025 COMPLETE CBC W/AUTO DIFF WBC: CPT | Performed by: NURSE PRACTITIONER

## 2021-01-01 PROCEDURE — 99496 TRANSJ CARE MGMT HIGH F2F 7D: CPT | Performed by: INTERNAL MEDICINE

## 2021-01-01 PROCEDURE — 90715 TDAP VACCINE 7 YRS/> IM: CPT | Performed by: NURSE PRACTITIONER

## 2021-01-01 PROCEDURE — 25010000002 ONDANSETRON PER 1 MG: Performed by: NURSE PRACTITIONER

## 2021-01-01 PROCEDURE — 80061 LIPID PANEL: CPT

## 2021-01-01 PROCEDURE — 83735 ASSAY OF MAGNESIUM: CPT | Performed by: NURSE PRACTITIONER

## 2021-01-01 PROCEDURE — 80053 COMPREHEN METABOLIC PANEL: CPT | Performed by: NURSE PRACTITIONER

## 2021-01-01 PROCEDURE — 97166 OT EVAL MOD COMPLEX 45 MIN: CPT

## 2021-01-01 PROCEDURE — 81003 URINALYSIS AUTO W/O SCOPE: CPT | Performed by: NURSE PRACTITIONER

## 2021-01-01 PROCEDURE — 80048 BASIC METABOLIC PNL TOTAL CA: CPT | Performed by: HOSPITALIST

## 2021-01-01 PROCEDURE — 85027 COMPLETE CBC AUTOMATED: CPT | Performed by: NURSE PRACTITIONER

## 2021-01-01 PROCEDURE — 93005 ELECTROCARDIOGRAM TRACING: CPT | Performed by: NURSE PRACTITIONER

## 2021-01-01 PROCEDURE — 25010000002 CEFTRIAXONE PER 250 MG: Performed by: NURSE PRACTITIONER

## 2021-01-01 PROCEDURE — 25010000003 POTASSIUM CHLORIDE 10 MEQ/100ML SOLUTION: Performed by: NURSE PRACTITIONER

## 2021-01-01 PROCEDURE — 97116 GAIT TRAINING THERAPY: CPT

## 2021-01-01 PROCEDURE — 80053 COMPREHEN METABOLIC PANEL: CPT | Performed by: EMERGENCY MEDICINE

## 2021-01-01 PROCEDURE — 25010000002 IOPAMIDOL 61 % SOLUTION: Performed by: EMERGENCY MEDICINE

## 2021-01-01 PROCEDURE — 84439 ASSAY OF FREE THYROXINE: CPT | Performed by: INTERNAL MEDICINE

## 2021-01-01 PROCEDURE — 25010000002 TDAP 5-2.5-18.5 LF-MCG/0.5 SUSPENSION: Performed by: NURSE PRACTITIONER

## 2021-01-01 PROCEDURE — 85027 COMPLETE CBC AUTOMATED: CPT

## 2021-01-01 PROCEDURE — 63710000001 ONDANSETRON PER 8 MG: Performed by: HOSPITALIST

## 2021-01-01 PROCEDURE — 99282 EMERGENCY DEPT VISIT SF MDM: CPT

## 2021-01-01 PROCEDURE — 90471 IMMUNIZATION ADMIN: CPT | Performed by: NURSE PRACTITIONER

## 2021-01-01 PROCEDURE — 94640 AIRWAY INHALATION TREATMENT: CPT

## 2021-01-01 PROCEDURE — 81001 URINALYSIS AUTO W/SCOPE: CPT | Performed by: EMERGENCY MEDICINE

## 2021-01-01 PROCEDURE — 81003 URINALYSIS AUTO W/O SCOPE: CPT | Performed by: INTERNAL MEDICINE

## 2021-01-01 PROCEDURE — 74177 CT ABD & PELVIS W/CONTRAST: CPT

## 2021-01-01 PROCEDURE — 84481 FREE ASSAY (FT-3): CPT | Performed by: INTERNAL MEDICINE

## 2021-01-01 PROCEDURE — 1111F DSCHRG MED/CURRENT MED MERGE: CPT | Performed by: INTERNAL MEDICINE

## 2021-01-01 PROCEDURE — 36415 COLL VENOUS BLD VENIPUNCTURE: CPT

## 2021-01-01 PROCEDURE — 80053 COMPREHEN METABOLIC PANEL: CPT

## 2021-01-01 PROCEDURE — 80053 COMPREHEN METABOLIC PANEL: CPT | Performed by: INTERNAL MEDICINE

## 2021-01-01 PROCEDURE — 84443 ASSAY THYROID STIM HORMONE: CPT

## 2021-01-01 PROCEDURE — 84484 ASSAY OF TROPONIN QUANT: CPT | Performed by: NURSE PRACTITIONER

## 2021-01-01 PROCEDURE — 83704 LIPOPROTEIN BLD QUAN PART: CPT

## 2021-01-01 PROCEDURE — 93010 ELECTROCARDIOGRAM REPORT: CPT | Performed by: INTERNAL MEDICINE

## 2021-01-01 PROCEDURE — 84439 ASSAY OF FREE THYROXINE: CPT

## 2021-01-01 PROCEDURE — 73590 X-RAY EXAM OF LOWER LEG: CPT

## 2021-01-01 PROCEDURE — 84443 ASSAY THYROID STIM HORMONE: CPT | Performed by: INTERNAL MEDICINE

## 2021-01-01 PROCEDURE — 99283 EMERGENCY DEPT VISIT LOW MDM: CPT

## 2021-01-01 PROCEDURE — 83036 HEMOGLOBIN GLYCOSYLATED A1C: CPT

## 2021-01-01 RX ORDER — ONDANSETRON 4 MG/1
TABLET, FILM COATED ORAL
Qty: 120 TABLET | Refills: 1 | Status: SHIPPED | OUTPATIENT
Start: 2021-01-01

## 2021-01-01 RX ORDER — ONDANSETRON 4 MG/1
4 TABLET, FILM COATED ORAL EVERY 6 HOURS PRN
Qty: 20 TABLET | Refills: 0 | Status: SHIPPED | OUTPATIENT
Start: 2021-01-01 | End: 2021-01-01

## 2021-01-01 RX ORDER — METRONIDAZOLE 500 MG/1
500 TABLET ORAL EVERY 8 HOURS SCHEDULED
Qty: 12 TABLET | Refills: 0 | Status: SHIPPED | OUTPATIENT
Start: 2021-01-01 | End: 2021-01-01

## 2021-01-01 RX ORDER — TERAZOSIN 2 MG/1
2 CAPSULE ORAL NIGHTLY
Status: DISCONTINUED | OUTPATIENT
Start: 2021-01-01 | End: 2021-01-01 | Stop reason: HOSPADM

## 2021-01-01 RX ORDER — SODIUM CHLORIDE 0.9 % (FLUSH) 0.9 %
10 SYRINGE (ML) INJECTION EVERY 12 HOURS SCHEDULED
Status: DISCONTINUED | OUTPATIENT
Start: 2021-01-01 | End: 2021-01-01

## 2021-01-01 RX ORDER — APIXABAN 2.5 MG/1
TABLET, FILM COATED ORAL
Qty: 60 TABLET | Refills: 0 | Status: SHIPPED | OUTPATIENT
Start: 2021-01-01 | End: 2021-01-01

## 2021-01-01 RX ORDER — NITROGLYCERIN 0.4 MG/1
0.4 TABLET SUBLINGUAL
Status: DISCONTINUED | OUTPATIENT
Start: 2021-01-01 | End: 2021-01-01

## 2021-01-01 RX ORDER — MAGNESIUM SULFATE HEPTAHYDRATE 40 MG/ML
4 INJECTION, SOLUTION INTRAVENOUS AS NEEDED
Status: DISCONTINUED | OUTPATIENT
Start: 2021-01-01 | End: 2021-01-01

## 2021-01-01 RX ORDER — CIPROFLOXACIN 500 MG/1
TABLET, FILM COATED ORAL
Qty: 10 TABLET | Refills: 0 | Status: SHIPPED | OUTPATIENT
Start: 2021-01-01 | End: 2021-01-01

## 2021-01-01 RX ORDER — ACETAMINOPHEN 500 MG
1000 TABLET ORAL ONCE
Status: DISCONTINUED | OUTPATIENT
Start: 2021-01-01 | End: 2021-01-01 | Stop reason: HOSPADM

## 2021-01-01 RX ORDER — DOCUSATE SODIUM 100 MG/1
100 CAPSULE, LIQUID FILLED ORAL 2 TIMES DAILY
Status: DISCONTINUED | OUTPATIENT
Start: 2021-01-01 | End: 2021-01-01 | Stop reason: HOSPADM

## 2021-01-01 RX ORDER — SODIUM CHLORIDE AND POTASSIUM CHLORIDE 150; 900 MG/100ML; MG/100ML
75 INJECTION, SOLUTION INTRAVENOUS CONTINUOUS
Status: DISCONTINUED | OUTPATIENT
Start: 2021-01-01 | End: 2021-01-01

## 2021-01-01 RX ORDER — DOXAZOSIN 2 MG/1
TABLET ORAL
Qty: 180 TABLET | Refills: 1 | Status: SHIPPED | OUTPATIENT
Start: 2021-01-01

## 2021-01-01 RX ORDER — VALSARTAN 160 MG/1
160 TABLET ORAL
Status: DISCONTINUED | OUTPATIENT
Start: 2021-01-01 | End: 2021-01-01 | Stop reason: HOSPADM

## 2021-01-01 RX ORDER — ACETAMINOPHEN 325 MG/1
650 TABLET ORAL EVERY 4 HOURS PRN
Status: DISCONTINUED | OUTPATIENT
Start: 2021-01-01 | End: 2021-01-01 | Stop reason: HOSPADM

## 2021-01-01 RX ORDER — CEFDINIR 300 MG/1
300 CAPSULE ORAL EVERY 12 HOURS SCHEDULED
Status: DISCONTINUED | OUTPATIENT
Start: 2021-01-01 | End: 2021-01-01 | Stop reason: HOSPADM

## 2021-01-01 RX ORDER — ONDANSETRON 4 MG/1
4 TABLET, FILM COATED ORAL EVERY 6 HOURS PRN
Status: DISCONTINUED | OUTPATIENT
Start: 2021-01-01 | End: 2021-01-01 | Stop reason: HOSPADM

## 2021-01-01 RX ORDER — HYDROXYZINE HYDROCHLORIDE 10 MG/1
TABLET, FILM COATED ORAL
Qty: 60 TABLET | Refills: 6 | Status: SHIPPED | OUTPATIENT
Start: 2021-01-01

## 2021-01-01 RX ORDER — METOPROLOL SUCCINATE 50 MG/1
TABLET, EXTENDED RELEASE ORAL
Qty: 90 TABLET | Refills: 1 | Status: SHIPPED | OUTPATIENT
Start: 2021-01-01

## 2021-01-01 RX ORDER — ACETAMINOPHEN 160 MG/5ML
650 SOLUTION ORAL EVERY 4 HOURS PRN
Status: DISCONTINUED | OUTPATIENT
Start: 2021-01-01 | End: 2021-01-01

## 2021-01-01 RX ORDER — VALSARTAN 160 MG/1
160 TABLET ORAL DAILY
Qty: 30 TABLET | Refills: 0 | Status: SHIPPED | OUTPATIENT
Start: 2021-01-01 | End: 2021-01-01

## 2021-01-01 RX ORDER — ACETAMINOPHEN 500 MG
1000 TABLET ORAL ONCE
Status: COMPLETED | OUTPATIENT
Start: 2021-01-01 | End: 2021-01-01

## 2021-01-01 RX ORDER — ONDANSETRON 2 MG/ML
4 INJECTION INTRAMUSCULAR; INTRAVENOUS EVERY 6 HOURS PRN
Status: DISCONTINUED | OUTPATIENT
Start: 2021-01-01 | End: 2021-01-01

## 2021-01-01 RX ORDER — SODIUM CHLORIDE 0.9 % (FLUSH) 0.9 %
10 SYRINGE (ML) INJECTION AS NEEDED
Status: DISCONTINUED | OUTPATIENT
Start: 2021-01-01 | End: 2021-01-01

## 2021-01-01 RX ORDER — CEFTRIAXONE SODIUM 1 G/50ML
1 INJECTION, SOLUTION INTRAVENOUS EVERY 24 HOURS
Status: DISCONTINUED | OUTPATIENT
Start: 2021-01-01 | End: 2021-01-01

## 2021-01-01 RX ORDER — POTASSIUM CHLORIDE 750 MG/1
40 TABLET, FILM COATED, EXTENDED RELEASE ORAL AS NEEDED
Status: DISCONTINUED | OUTPATIENT
Start: 2021-01-01 | End: 2021-01-01 | Stop reason: HOSPADM

## 2021-01-01 RX ORDER — VALSARTAN AND HYDROCHLOROTHIAZIDE 160; 12.5 MG/1; MG/1
TABLET, FILM COATED ORAL
Qty: 90 TABLET | Refills: 2
Start: 2021-01-01 | End: 2021-01-01

## 2021-01-01 RX ORDER — DOXAZOSIN 2 MG/1
TABLET ORAL
Qty: 90 TABLET | Refills: 1 | Status: SHIPPED | OUTPATIENT
Start: 2021-01-01 | End: 2021-01-01 | Stop reason: SDUPTHER

## 2021-01-01 RX ORDER — MAGNESIUM SULFATE HEPTAHYDRATE 40 MG/ML
2 INJECTION, SOLUTION INTRAVENOUS ONCE
Status: COMPLETED | OUTPATIENT
Start: 2021-01-01 | End: 2021-01-01

## 2021-01-01 RX ORDER — SODIUM CHLORIDE 9 MG/ML
75 INJECTION, SOLUTION INTRAVENOUS CONTINUOUS
Status: DISCONTINUED | OUTPATIENT
Start: 2021-01-01 | End: 2021-01-01

## 2021-01-01 RX ORDER — IPRATROPIUM BROMIDE AND ALBUTEROL SULFATE 2.5; .5 MG/3ML; MG/3ML
3 SOLUTION RESPIRATORY (INHALATION) EVERY 6 HOURS PRN
Status: DISCONTINUED | OUTPATIENT
Start: 2021-01-01 | End: 2021-01-01 | Stop reason: HOSPADM

## 2021-01-01 RX ORDER — SODIUM CHLORIDE 0.9 % (FLUSH) 0.9 %
10 SYRINGE (ML) INJECTION AS NEEDED
Status: DISCONTINUED | OUTPATIENT
Start: 2021-01-01 | End: 2021-01-01 | Stop reason: HOSPADM

## 2021-01-01 RX ORDER — ACETAMINOPHEN 650 MG/1
650 SUPPOSITORY RECTAL EVERY 4 HOURS PRN
Status: DISCONTINUED | OUTPATIENT
Start: 2021-01-01 | End: 2021-01-01

## 2021-01-01 RX ORDER — METRONIDAZOLE 500 MG/1
500 TABLET ORAL EVERY 8 HOURS SCHEDULED
Status: DISCONTINUED | OUTPATIENT
Start: 2021-01-01 | End: 2021-01-01 | Stop reason: HOSPADM

## 2021-01-01 RX ORDER — VALSARTAN AND HYDROCHLOROTHIAZIDE 160; 12.5 MG/1; MG/1
TABLET, FILM COATED ORAL
Qty: 90 TABLET | Refills: 1 | Status: SHIPPED | OUTPATIENT
Start: 2021-01-01

## 2021-01-01 RX ORDER — APIXABAN 2.5 MG/1
TABLET, FILM COATED ORAL
Qty: 60 TABLET | Refills: 0 | Status: SHIPPED | OUTPATIENT
Start: 2021-01-01 | End: 2021-01-01 | Stop reason: SDUPTHER

## 2021-01-01 RX ORDER — FLUCONAZOLE 200 MG/1
TABLET ORAL
Qty: 4 TABLET | Refills: 0 | Status: SHIPPED | OUTPATIENT
Start: 2021-01-01 | End: 2021-01-01

## 2021-01-01 RX ORDER — METOPROLOL SUCCINATE 50 MG/1
50 TABLET, EXTENDED RELEASE ORAL
Status: DISCONTINUED | OUTPATIENT
Start: 2021-01-01 | End: 2021-01-01 | Stop reason: HOSPADM

## 2021-01-01 RX ORDER — POTASSIUM CHLORIDE 7.45 MG/ML
10 INJECTION INTRAVENOUS
Status: DISCONTINUED | OUTPATIENT
Start: 2021-01-01 | End: 2021-01-01 | Stop reason: HOSPADM

## 2021-01-01 RX ORDER — CEFDINIR 300 MG/1
300 CAPSULE ORAL EVERY 12 HOURS SCHEDULED
Qty: 8 CAPSULE | Refills: 0 | Status: SHIPPED | OUTPATIENT
Start: 2021-01-01 | End: 2021-01-01

## 2021-01-01 RX ORDER — LEVOTHYROXINE SODIUM 0.05 MG/1
TABLET ORAL
Qty: 90 TABLET | Refills: 1 | Status: SHIPPED | OUTPATIENT
Start: 2021-01-01

## 2021-01-01 RX ORDER — MAGNESIUM SULFATE HEPTAHYDRATE 40 MG/ML
2 INJECTION, SOLUTION INTRAVENOUS AS NEEDED
Status: DISCONTINUED | OUTPATIENT
Start: 2021-01-01 | End: 2021-01-01

## 2021-01-01 RX ORDER — ONDANSETRON 4 MG/1
TABLET, FILM COATED ORAL
Qty: 30 TABLET | Refills: 0 | Status: SHIPPED | OUTPATIENT
Start: 2021-01-01 | End: 2021-01-01 | Stop reason: SDUPTHER

## 2021-01-01 RX ORDER — POTASSIUM CHLORIDE 1.5 G/1.77G
40 POWDER, FOR SOLUTION ORAL AS NEEDED
Status: DISCONTINUED | OUTPATIENT
Start: 2021-01-01 | End: 2021-01-01 | Stop reason: HOSPADM

## 2021-01-01 RX ORDER — LEVOTHYROXINE SODIUM 0.05 MG/1
TABLET ORAL
Qty: 90 TABLET | Refills: 1 | Status: SHIPPED | OUTPATIENT
Start: 2021-01-01 | End: 2021-01-01

## 2021-01-01 RX ORDER — CEFTRIAXONE SODIUM 1 G/50ML
1 INJECTION, SOLUTION INTRAVENOUS ONCE
Status: COMPLETED | OUTPATIENT
Start: 2021-01-01 | End: 2021-01-01

## 2021-01-01 RX ORDER — ALPRAZOLAM 0.5 MG/1
TABLET ORAL
Qty: 30 TABLET | Refills: 1 | Status: SHIPPED | OUTPATIENT
Start: 2021-01-01

## 2021-01-01 RX ADMIN — TERAZOSIN HYDROCHLORIDE 2 MG: 2 CAPSULE ORAL at 20:03

## 2021-01-01 RX ADMIN — ONDANSETRON 4 MG: 2 INJECTION INTRAMUSCULAR; INTRAVENOUS at 13:19

## 2021-01-01 RX ADMIN — SODIUM CHLORIDE 75 ML/HR: 9 INJECTION, SOLUTION INTRAVENOUS at 05:26

## 2021-01-01 RX ADMIN — METRONIDAZOLE 500 MG: 500 TABLET, FILM COATED ORAL at 06:21

## 2021-01-01 RX ADMIN — CEFTRIAXONE SODIUM 1 G: 1 INJECTION, SOLUTION INTRAVENOUS at 12:18

## 2021-01-01 RX ADMIN — METOPROLOL SUCCINATE 50 MG: 50 TABLET, EXTENDED RELEASE ORAL at 08:32

## 2021-01-01 RX ADMIN — METRONIDAZOLE 500 MG: 500 INJECTION, SOLUTION INTRAVENOUS at 15:13

## 2021-01-01 RX ADMIN — METRONIDAZOLE 500 MG: 500 INJECTION, SOLUTION INTRAVENOUS at 06:15

## 2021-01-01 RX ADMIN — METRONIDAZOLE 500 MG: 500 INJECTION, SOLUTION INTRAVENOUS at 23:26

## 2021-01-01 RX ADMIN — IOPAMIDOL 85 ML: 612 INJECTION, SOLUTION INTRAVENOUS at 10:17

## 2021-01-01 RX ADMIN — METRONIDAZOLE 500 MG: 500 INJECTION, SOLUTION INTRAVENOUS at 23:59

## 2021-01-01 RX ADMIN — POTASSIUM CHLORIDE 10 MEQ: 7.46 INJECTION, SOLUTION INTRAVENOUS at 11:33

## 2021-01-01 RX ADMIN — SODIUM CHLORIDE 75 ML/HR: 9 INJECTION, SOLUTION INTRAVENOUS at 15:14

## 2021-01-01 RX ADMIN — POTASSIUM CHLORIDE AND SODIUM CHLORIDE 75 ML/HR: 900; 150 INJECTION, SOLUTION INTRAVENOUS at 09:20

## 2021-01-01 RX ADMIN — DOCUSATE SODIUM 100 MG: 100 CAPSULE, LIQUID FILLED ORAL at 20:00

## 2021-01-01 RX ADMIN — DOCUSATE SODIUM 100 MG: 100 CAPSULE, LIQUID FILLED ORAL at 08:32

## 2021-01-01 RX ADMIN — METRONIDAZOLE 500 MG: 500 TABLET, FILM COATED ORAL at 21:07

## 2021-01-01 RX ADMIN — POTASSIUM CHLORIDE AND SODIUM CHLORIDE 75 ML/HR: 900; 150 INJECTION, SOLUTION INTRAVENOUS at 06:15

## 2021-01-01 RX ADMIN — METRONIDAZOLE 500 MG: 500 INJECTION, SOLUTION INTRAVENOUS at 15:34

## 2021-01-01 RX ADMIN — METRONIDAZOLE 500 MG: 500 TABLET, FILM COATED ORAL at 16:41

## 2021-01-01 RX ADMIN — IPRATROPIUM BROMIDE AND ALBUTEROL SULFATE 3 ML: 2.5; .5 SOLUTION RESPIRATORY (INHALATION) at 05:40

## 2021-01-01 RX ADMIN — DOCUSATE SODIUM 100 MG: 100 CAPSULE, LIQUID FILLED ORAL at 08:27

## 2021-01-01 RX ADMIN — VALSARTAN 160 MG: 160 TABLET, FILM COATED ORAL at 07:42

## 2021-01-01 RX ADMIN — CEFTRIAXONE SODIUM 1 G: 1 INJECTION, SOLUTION INTRAVENOUS at 13:01

## 2021-01-01 RX ADMIN — SODIUM CHLORIDE, PRESERVATIVE FREE 10 ML: 5 INJECTION INTRAVENOUS at 15:14

## 2021-01-01 RX ADMIN — MAGNESIUM SULFATE HEPTAHYDRATE 2 G: 2 INJECTION, SOLUTION INTRAVENOUS at 08:40

## 2021-01-01 RX ADMIN — VALSARTAN 160 MG: 160 TABLET, FILM COATED ORAL at 08:32

## 2021-01-01 RX ADMIN — IPRATROPIUM BROMIDE AND ALBUTEROL SULFATE 3 ML: 2.5; .5 SOLUTION RESPIRATORY (INHALATION) at 04:50

## 2021-01-01 RX ADMIN — Medication 3 ML: at 09:28

## 2021-01-01 RX ADMIN — IPRATROPIUM BROMIDE AND ALBUTEROL SULFATE 3 ML: 2.5; .5 SOLUTION RESPIRATORY (INHALATION) at 15:23

## 2021-01-01 RX ADMIN — CEFDINIR 300 MG: 300 CAPSULE ORAL at 20:03

## 2021-01-01 RX ADMIN — TERAZOSIN HYDROCHLORIDE 2 MG: 2 CAPSULE ORAL at 20:00

## 2021-01-01 RX ADMIN — VALSARTAN 160 MG: 160 TABLET, FILM COATED ORAL at 08:27

## 2021-01-01 RX ADMIN — ACETAMINOPHEN 650 MG: 325 TABLET, FILM COATED ORAL at 14:47

## 2021-01-01 RX ADMIN — METOPROLOL SUCCINATE 50 MG: 50 TABLET, EXTENDED RELEASE ORAL at 08:27

## 2021-01-01 RX ADMIN — DOCUSATE SODIUM 100 MG: 100 CAPSULE, LIQUID FILLED ORAL at 07:42

## 2021-01-01 RX ADMIN — TETANUS TOXOID, REDUCED DIPHTHERIA TOXOID AND ACELLULAR PERTUSSIS VACCINE, ADSORBED 0.5 ML: 5; 2.5; 8; 8; 2.5 SUSPENSION INTRAMUSCULAR at 09:59

## 2021-01-01 RX ADMIN — METOPROLOL SUCCINATE 50 MG: 50 TABLET, EXTENDED RELEASE ORAL at 07:41

## 2021-01-01 RX ADMIN — TERAZOSIN HYDROCHLORIDE 2 MG: 2 CAPSULE ORAL at 21:01

## 2021-01-01 RX ADMIN — ONDANSETRON HYDROCHLORIDE 4 MG: 4 TABLET, FILM COATED ORAL at 08:46

## 2021-01-01 RX ADMIN — METRONIDAZOLE 500 MG: 500 INJECTION, SOLUTION INTRAVENOUS at 06:03

## 2021-01-01 RX ADMIN — IPRATROPIUM BROMIDE AND ALBUTEROL SULFATE 3 ML: 2.5; .5 SOLUTION RESPIRATORY (INHALATION) at 10:57

## 2021-01-01 RX ADMIN — ONDANSETRON 4 MG: 2 INJECTION INTRAMUSCULAR; INTRAVENOUS at 06:04

## 2021-01-01 RX ADMIN — DOCUSATE SODIUM 100 MG: 100 CAPSULE, LIQUID FILLED ORAL at 20:03

## 2021-01-01 RX ADMIN — DOCUSATE SODIUM 100 MG: 100 CAPSULE, LIQUID FILLED ORAL at 21:01

## 2021-01-01 RX ADMIN — ACETAMINOPHEN 1000 MG: 500 TABLET ORAL at 12:17

## 2021-01-01 RX ADMIN — ACETAMINOPHEN 650 MG: 325 TABLET, FILM COATED ORAL at 05:27

## 2021-01-01 RX ADMIN — CEFDINIR 300 MG: 300 CAPSULE ORAL at 08:32

## 2021-01-22 NOTE — TELEPHONE ENCOUNTER
PATIENT IS HAVING LOOSE BOWELS, SHE HAS CUT OUT MILK AND ANY FRIED FOODS, THIS HAS HELPED BUT NOT COMPLETELY TAKEN IT AWAY. PATIENT WOULD LIKE ADVICE FROM CLINICAL ON HOW TO PROCEED AS WELL AS IF SHE SHOULD GET A COVID VACCINE WHILE EXPERIENCING THIS.    PLEASE ADVISE  [phone]

## 2021-01-22 NOTE — TELEPHONE ENCOUNTER
PATIENT IS HAVING LOOSE BOWELS, SHE HAS CUT OUT MILK AND ANY FRIED FOODS, THIS HAS HELPED BUT NOT COMPLETELY TAKEN IT AWAY. PATIENT WOULD LIKE ADVICE FROM CLINICAL ON HOW TO PROCEED AS WELL AS IF SHE SHOULD GET A COVID VACCINE WHILE EXPERIENCING THIS.     PLEASE ADVISE  [phone]

## 2021-01-22 NOTE — TELEPHONE ENCOUNTER
Have patient take Imodium AD on a regular basis for the next week or so but she also needs to increase the fiber in her diet and also consider taking a probiotic.  If symptoms persist then she should come in and see me.

## 2021-02-09 NOTE — TELEPHONE ENCOUNTER
Eliquis 2.5 mg   Last office visit 11/5/2020  Next office visit 11/8/2021 with Pacemaker check  Last EKG 11/5/2020  Labs 11/25/2020

## 2021-02-23 PROBLEM — Z95.1 HX OF CABG: Status: ACTIVE | Noted: 2021-01-01

## 2021-02-23 PROBLEM — E87.6 HYPOKALEMIA: Status: ACTIVE | Noted: 2021-01-01

## 2021-02-23 PROBLEM — S01.01XA LACERATION OF OCCIPITAL SCALP: Status: ACTIVE | Noted: 2021-01-01

## 2021-02-23 PROBLEM — Z95.0 PRESENCE OF CARDIAC PACEMAKER: Status: ACTIVE | Noted: 2021-01-01

## 2021-02-23 PROBLEM — E87.1 HYPONATREMIA: Status: ACTIVE | Noted: 2021-01-01

## 2021-02-23 PROBLEM — K57.92 ACUTE DIVERTICULITIS: Status: ACTIVE | Noted: 2021-01-01

## 2021-02-25 PROBLEM — F07.81 POST-CONCUSSION VERTIGO: Status: ACTIVE | Noted: 2021-01-01

## 2021-02-25 PROBLEM — R42 POST-CONCUSSION VERTIGO: Status: ACTIVE | Noted: 2021-01-01

## 2021-02-25 PROBLEM — E87.6 HYPOKALEMIA: Status: RESOLVED | Noted: 2021-01-01 | Resolved: 2021-01-01

## 2021-02-27 NOTE — OUTREACH NOTE
Prep Survey      Responses   [facility] facility patient discharged from?  [facility]   Is LACE score < 7 ?  Yes   Emergency Room discharge w/ pulse ox?  No   Eligibility  [facility]   Date of Admission  02/23/21   Date of Discharge  02/26/21   Discharge Disposition  Home or Self Care   Discharge diagnosis  acute diverticulitis, A-fib, Post-concussion vertigo   Does the patient have one of the following disease processes/diagnoses(primary or secondary)?  Other   Does the patient have Home health ordered?  No   Is there a DME ordered?  No   Prep survey completed?  Yes          Almita Bush RN

## 2021-03-01 NOTE — OUTREACH NOTE
Call Center TCM Note      Responses   [facility] patient discharged from?  [facility]   Does the patient have one of the following disease processes/diagnoses(primary or secondary)?  Other   TCM attempt successful?  Yes   Call start time  1344   Call end time  1348   Discharge diagnosis  acute diverticulitis, A-fib, Post-concussion vertigo   Person spoke with today (if not patient) and relationship  Maria Teresa-daughter    Meds reviewed with patient/caregiver?  Yes   Is the patient having any side effects they believe may be caused by any medication additions or changes?  Yes   Side effects comments   upset stomach   Does the patient have all medications ordered at discharge?  Yes   Is the patient taking all medications as directed (includes completed medication regime)?  Yes   Does the patient have a primary care provider?   Yes   Does the patient have an appointment with their PCP within 7 days of discharge?  Yes   Comments regarding PCP  Hospital d/c f/u appt is on 3/5/21 at 12:30 pm    Has the patient kept scheduled appointments due by today?  N/A   Psychosocial issues?  No   Psychosocial comments  Pt and daughter live together   Did the patient receive a copy of their discharge instructions?  Yes   Nursing interventions  Reviewed instructions with patient   What is the patient's perception of their health status since discharge?  Same   Is the patient/caregiver able to teach back signs and symptoms related to disease process for when to call PCP?  Yes   Is the patient/caregiver able to teach back signs and symptoms related to disease process for when to call 911?  Yes   Is the patient/caregiver able to teach back the hierarchy of who to call/visit for symptoms/problems? PCP, Specialist, Home health nurse, Urgent Care, ED, 911  Yes   If the patient is a current smoker, are they able to teach back resources for cessation?  Not a smoker   TCM call completed?  Yes          Ivette Yost RN    3/1/2021, 13:49  EST

## 2021-03-05 PROBLEM — E87.1 HYPONATREMIA: Status: RESOLVED | Noted: 2021-01-01 | Resolved: 2021-01-01

## 2021-03-05 PROBLEM — Z09 HOSPITAL DISCHARGE FOLLOW-UP: Status: ACTIVE | Noted: 2021-01-01

## 2021-03-05 NOTE — PROGRESS NOTES
2021    Patient Information  Lora Serrano                                                                                          [id number] [facility] [address]      7/15/1926  [unfilled]  There is no work phone number on file.    Chief Complaint:     Follow-up hospitalization for acute diverticulitis.  No new acute complaints.    History of Present Illness:    Patient with a history of several chronic medical issues including occlusive coronary artery disease, status post CABG, subclinical hypothyroidism, impaired fasting glucose, osteopenia, vitamin D deficiency, hyperlipidemia, hypertension, history of atrial flutter, permanent cardiac pacemaker placement, chronic anticoagulation with Eliquis for permanent atrial fibrillation, statin intolerance, hyponatremia.  She presents today for a follow-up after being admitted to the hospital with abdominal pain and work-up revealed acute diverticulitis.  Patient also suffered a scalp laceration when she was accidentally dropped during transport by EMS.  The history regarding this will be described below.  Past medical history reviewed and updated were necessary including health maintenance parameters.  This reveals she is up-to-date or else accounted for.    The history regarding recent hospitalization for acute diverticulitis and scalp laceration:    2021--patient seen in follow-up by Dr. Mix.  [place] removed.  She reports she is feeling better.  She is having bowel movements and eating well.  No significant abdominal pain.  The hospital records reviewed at length including emergency room history and physical, admission history and physical, hospital course, discharge summary, radiographic and laboratory studies, discharge medications.  Restart Eliquis.      Patient Name: Lora Serrano  : 7/15/1926  MRN: [id number]     Date of Admission: 2021  Date of Discharge:  2021  Primary Care Physician: Adilson Mix,  "MD        Chief Complaint:   Abdominal Pain, Fall, and Weakness - Generalized        Discharge Diagnoses            Active Hospital Problems     Diagnosis   POA   • **Acute diverticulitis [K57.92]   Yes   • Post-concussion vertigo [F07.81, R42]   No   • Hx of CABG [Z95.1]   Not Applicable   • Presence of cardiac pacemaker [Z95.0]   Yes   • Hyponatremia [E87.1]   Yes   • Laceration of occipital scalp [S01.01XA]   Yes   • Permanent atrial fibrillation (CMS/HCC) [I48.21]   Yes   • Chronic anticoagulation, Eliquis for permanent atrial fibrillation [Z79.01]   Not Applicable   • Benign essential hypertension [I10]   Yes   • Hyperlipidemia [E78.2]   Yes   • Moderate mitral regurgitation, 02/18/2012--moderate MR, EF 58%. [I34.0]   Yes       Resolved Hospital Problems     Diagnosis Date Resolved POA   • Hypokalemia [E87.6] 02/25/2021 Yes         Hospital Course      Ms. Serrano is a 89 y.o. female with a history of CAD and atrial fibrillation on Eliquis who presented to [facility] initially complaining of abdominal pain.  Please see the admitting H&P for further details.  She was found to have diverticulitis on CT scan and was admitted to the hospital for further evaluation and treatment.  Patient had 2 staples placed in her posterior scalp due to a small head laceration she received when EMS drivers dropped her at her home trying to get her into ambulance.  There was no loss of consciousness but patient has had some dizziness and headache during her stay.  Repeat head CT stable.  Dizziness has improved as has her headache.  In regards to her diverticulitis she seems to have responded well to antibiotics.  There was suggestion on CT scan that a \"underlying colonic lesion cannot be excluded\" but patient declined surgical consultation.  She is not interested in endoscopy and certainly not surgery.  As stated she seems to have responded well to treatment.  She has penicillin allergy and we are giving her Omnicef " and Flagyl.  She has some mild persistent nausea for which will prescribe Zofran.  She will follow-up with her PCP next week.  Staples to be removed 7 to 10 days from ER visit.    February 23, 2021--CT scan of the abdomen and pelvis:    IMPRESSION:     Extensive colonic diverticulosis with focal wall thickening and mild adjacent fat stranding at the proximal sigmoid colon, suggestive of acute uncomplicated diverticulitis. An underlying colonic lesion cannot  be excluded. Follow-up colonoscopy should be considered.     Discussed with WATSON Hurst at 10:55 AM.     This report was finalized on 2/23/2021 10:55 AM by Dr. Yakelin Singh M.D.      Review of Systems   Constitution: Negative.   HENT: Negative.    Eyes: Negative.    Cardiovascular: Negative.    Respiratory: Negative.    Endocrine: Negative.    Hematologic/Lymphatic: Negative.    Skin: Negative.    Musculoskeletal: Negative.    Gastrointestinal: Negative.    Genitourinary: Negative.    Neurological: Negative.  Negative for dizziness, headaches, light-headedness and vertigo.   Psychiatric/Behavioral: Negative.    Allergic/Immunologic: Negative.        Active Problems:    Patient Active Problem List   Diagnosis   • Occlusive coronary artery disease, 12/11/2000--status post 5 vessel CABG   • Subclinical hypothyroidism   • Impaired fasting glucose   • Onychomycosis of fingernails   • Osteopenia of multiple sites   • Vitamin D deficiency   • Hyperlipidemia   • Benign essential hypertension   • Generalized osteoarthritis of multiple sites   • Therapeutic drug monitoring   • History of atrial flutter, 2003--successful catheter ablation for persistent atrial flutter.   • Moderate mitral regurgitation, 02/18/2012--moderate MR, EF 58%.   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 2006--PM replacement.  Medtronic EnRhythm [id number]. 11/30/2015--PM generator change.   • Primary osteoarthritis of both knees   • Statin  intolerance   • Hyponatremia   • Mitral valve annular calcification   • Chronic anticoagulation, Eliquis for permanent atrial fibrillation   • Permanent atrial fibrillation (CMS/HCC)   • Acute diverticulitis   • Laceration of occipital scalp   • Post-concussion vertigo   • Hospital discharge follow-up         Past Medical History:   Diagnosis Date   • Benign essential hypertension 3/14/2016   • Chronic anticoagulation, Eliquis for permanent atrial fibrillation 8/5/2020   • Generalized osteoarthritis of multiple sites 3/14/2016   • History of atrial flutter, 2003--successful catheter ablation for persistent atrial flutter. 2003 2003--catheter ablation for persistent atrial flutter was successful.   • History of complete AV block, 10/26/2006--permanent pacemaker replacement.  12/18/2000--dual-chamber pacemaker placement for sick sinus syndrome, atrial flutter, AV block after CABG. 12/18/2000 12/18/2000--dual-chamber pacemaker placement for sick sinus syndrome, atrial flutter, AV block that occurred after her bypass surgery. 10/26/2006--status post pacemaker replacement. Medtronic EnRhythm [id number]   • History of drug rash, 01/11/2014--name brand Synthroid 01/11/2014 01/11/2014--patient presented with a diffuse erythematous rash that was very pruritic. Examination consistent with a drug reaction/allergic dermatitis. Believed to be secondary to name brand Synthroid. This was discontinued and patient treated with a Medrol Dosepak.   • History of osteoporosis, June 2001 osteoporosis of the LS spine.  2011--improved to osteopenia only. 2011,2001 01/31/2011--DEXA scan  revealed LS spine T score -2.2, left hip -1.0.  Now osteopenia.   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 2006--PM replacement.  Medtronic EnRhythm [id number]. 11/30/2015--PM generator change. 11/30/2015 11/30/2015--pacemaker generator  change.  10/26/2006--status post pacemaker replacement. Medtronic EnRhythm [id number]  12/18/2000--dual-chamber pacemaker placement for sick sinus syndrome, atrial flutter, AV block that occurred after her bypass surgery.   2000--dual-chamber PM for SSS, A flutter, AVB after CABG. 2006--PM replacement.  Medtronic EnRhythm [id number]. 11/30/2015--PM generator change.   • Hyperlipidemia 3/14/2016   • Impaired fasting glucose 3/16/2016    03/16/2015--type 2 diabetes is now evolved into impaired fasting glucose.  Hemoglobin A1c 5.7.  Diagnosed January 2010   • Mitral valve annular calcification 7/16/2019    January 15, 2019--CT scan of the chest without contrast reveals nodular calcification within the mitral valve annulus that accounts for a nodular opacity in the left lower lung zone shown on the patient's recent chest x-ray.  No pulmonary nodules or infiltrates are currently identified.  Mild cardiomegaly.  Small fixed hiatal hernia.  01/15/2019--patient seen in follow-up and reports her lower res   • Moderate mitral regurgitation, 02/18/2012--moderate MR, EF 58%. 1/18/2012 01/18/2012 revealed ejection fraction of 58%, moderate mitral regurgitation.   • Occlusive coronary artery disease, 12/11/2000--status post 5 vessel CABG 12/11/2000 12/11/2000--status post 5 vessel CABG.  Patient subsequently had chest pain from her sternal wires which were removed.   • Onychomycosis of fingernails 3/14/2016    Patient has had onychomycosis involving her fingernails for several years.  She tries to control it with Ertaczo cream.   • Osteopenia of multiple sites 6/1/2001 01/31/2011--DEXA scan  revealed LS spine T score -2.2, left hip -1.0.  Now osteopenia.   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   • Permanent atrial fibrillation (CMS/[facility]) 11/5/2020   • Primary osteoarthritis of both knees 9/28/2016 09/28/2016--patient seen in follow-up and continues to have bilateral knee discomfort particularly with prolonged  use.  Right knee is worse than the left.  She understandably does not want have a knee replacement but I think she would benefit from orthopedic evaluation for consideration of cortisone or visco supplementation.  Patient referred to Dr. Wallace.  08/26/2016--x-ray of the right knee reveals very severe degenerative changes involving the medial compartment with marked joint space narrowing and subchondral sclerosis and marginal spurring.  No joint effusion and no acute pathology.   • Statin intolerance 3/28/2017    Intolerance to multiple statins.   • Status post catheter ablation of atrial flutter, 2002--successful catheter ablation for persistent atrial flutter. 2003 2003--catheter ablation for persistent atrial flutter was successful.   • Subclinical hypothyroidism 5/9/2013    Diagnosed 05/16/2013.   05/09/2013--ultrasound thyroid for new hypothyroidism revealed a small thyroid gland consistent with hypothyroidism.  Mixed nodule present.   • Vitamin D deficiency 3/14/2016         Past Surgical History:   Procedure Laterality Date   • CARDIAC ABLATION  07/02/2003 07/02/2003--catheter ablation for persistent atrial flutter was successful.   • CARDIAC PACEMAKER PLACEMENT  12/18/2000 12/18/2000--dual-chamber pacemaker placement for sick sinus syndrome, atrial flutter, AV block that occurred after her bypass surgery.   • CARPAL TUNNEL RELEASE Right 01/2010 January 2010--status post right carpal tunnel release. No surgical treatment on left.   • CORONARY ARTERY BYPASS GRAFT  12/2000 December 2000--coronary artery bypass x 5   • HX OVARIAN CYSTECTOMY  Remote    Remote ovarian cyst excision.   • INCISIONAL BREAST BIOPSY  Remote    Benign   • PACEMAKER REPLACEMENT  10/26/2006    10/26/2006--status post pacemaker replacement. Medtronic EnRhythm [id number]    • PACEMAKER REPLACEMENT  11/30/2015 11/30/2015--pacemaker generator change.         Allergies   Allergen Reactions   • Atorvastatin    • Levothyroxine  Sodium    • Other      Seafood   • Penicillins            Current Outpatient Medications:   •  apixaban (Eliquis) 2.5 MG tablet tablet, Take 1 p.o. twice daily approximately 12 hours apart for blood thinner., Disp: 60 tablet, Rfl: 11  •  Cholecalciferol (VITAMIN D) 1000 UNITS tablet, Take 1 tablet by mouth daily., Disp: , Rfl:   •  clotrimazole-betamethasone (LOTRISONE) 1-0.05 % cream, Apply to affected areas 3 times daily as directed, Disp: 45 g, Rfl: 2  •  Coenzyme Q10 (COQ10) 400 MG capsule, Take 1 capsule by mouth daily. With food, Disp: , Rfl:   •  doxazosin (CARDURA) 2 MG tablet, TAKE ONE TABLET BY MOUTH DAILY FOR HIGH BLOOD PRESSURE, Disp: 90 tablet, Rfl: 2  •  metoprolol succinate XL (TOPROL-XL) 50 MG 24 hr tablet, TAKE ONE TABLET BY MOUTH EVERY MORNING FOR HIGH BLOOD PRESSURE AND HEART, Disp: 90 tablet, Rfl: 2  •  ondansetron (ZOFRAN) 4 MG tablet, Take 1 tablet by mouth Every 6 (Six) Hours As Needed for Nausea or Vomiting., Disp: 20 tablet, Rfl: 0  •  Red Yeast Rice 600 MG tablet, Take 2 tablets by mouth every day., Disp: , Rfl:   •  valsartan-hydrochlorothiazide (DIOVAN-HCT) 160-12.5 MG per tablet, Take 1 p.o. every morning for high blood pressure, Disp: 90 tablet, Rfl: 2      Family History   Problem Relation Age of Onset   • Hyperlipidemia Mother    • Breast cancer Sister    • Diabetes type II Sister          Social History     Socioeconomic History   • Marital status:      Spouse name: Not on file   • Number of children: 2   • Years of education: Not on file   • Highest education level: 12th grade   Occupational History   • Occupation: Retired   Social Needs   • Financial resource strain: Not hard at all   • Food insecurity     Worry: Never true     Inability: Never true   • Transportation needs     Medical: No     Non-medical: No   Tobacco Use   • Smoking status: Never Smoker   • Smokeless tobacco: Never Used   Substance and Sexual Activity   • Alcohol use: No     Frequency: Never     Binge  "frequency: Never   • Drug use: No   • Sexual activity: Not Currently     Partners: Male   Lifestyle   • Physical activity     Days per week: 0 days     Minutes per session: 0 min   • Stress: Not at all   Relationships   • Social connections     Talks on phone: More than three times a week     Gets together: Twice a week     Attends [employer] service: More than 4 times per year     Active member of club or organization: No     Attends meetings of clubs or organizations: Never     Relationship status:          Vitals:    03/05/21 1231   Pulse: 57   SpO2: 95%   Height: 152.4 cm (60\")        Body mass index is 23.44 kg/m².      Physical Exam:    General: Alert and oriented x 3.  No acute distress.  Normal affect.  HEENT: Pupils equal, round, reactive to light; extraocular movements intact; sclerae nonicteric; pharynx, ear canals and TMs normal.  Her scalp wound appears to be healing well.  Staples removed.  No signs of infection or bleeding.  Neck: Without JVD, thyromegaly, bruit, or adenopathy.  Lungs: Clear to auscultation in all fields.  Heart: Regular rate and rhythm without murmur, rub, gallop, or click.  Abdomen: Soft, nontender, without hepatosplenomegaly or hernia.  Bowel sounds normal.  : Deferred.  Rectal: Deferred.  Extremities: Without clubbing, cyanosis, or pulse deficit.  There is 2-3+ bilateral lower extremity edema below the knees.  No signs of cellulitis.  Neurologic: Intact without focal deficit.  Normal station and gait observed during ingress and egress from the examination room.  Skin: Without significant lesion.  Musculoskeletal: Unremarkable.    Lab/other results:    I reviewed the documentation from the hospitalization including emergency room history and physical, admission history and physical, hospital course, laboratory and radiographic studies, discharge summary, discharge medications.    Assessment/Plan:     Diagnosis Plan   1. Occlusive coronary artery disease, 12/11/2000--status " post 5 vessel CABG     2. Subclinical hypothyroidism  TSH    T4, Free    T3, Free   3. Impaired fasting glucose  Comprehensive Metabolic Panel   4. Osteopenia of multiple sites     5. Vitamin D deficiency     6. Hyperlipidemia     7. Benign essential hypertension  Comprehensive Metabolic Panel    valsartan-hydrochlorothiazide (DIOVAN-HCT) 160-12.5 MG per tablet   8. History of atrial flutter, 2003--successful catheter ablation for persistent atrial flutter.     9. History of permanent cardiac PM, 2000--dual-chamber PM for SSS, A flutter, AVB after CABG. 2006--PM replacement.  Medtronic EnRhythm [id number]. 11/30/2015--PM generator change.     10. Statin intolerance     11. Chronic anticoagulation, Eliquis for permanent atrial fibrillation  apixaban (Eliquis) 2.5 MG tablet tablet   12. Permanent atrial fibrillation (CMS/HCC)  apixaban (Eliquis) 2.5 MG tablet tablet   13. Hyponatremia  Comprehensive Metabolic Panel   14. Therapeutic drug monitoring  CBC (No Diff)   15. Hypokalemia  Comprehensive Metabolic Panel   16. Bilateral lower extremity edema       Current outpatient and discharge medications have been reconciled for the patient.  Reviewed by: Adilson Mix MD    Patient seen in follow-up after having an episode of acute diverticulitis.  She seems to be progressing well.  Abdominal pain has resolved.  She is having bowel movements and eating okay.  She also suffered a scalp laceration after being accidentally dropped by EMS during transport.  The wound is healing well.  In regards to her chronic medical problems listed above, they have been stable prior to this admission.  Patient's Eliquis was dropped due to the scalp laceration.  I think it is safe to go ahead and reinitiate the Eliquis.  Her hyponatremia needs to be reassessed.  She also had low potassium that needs to be assessed as well.  She also needs a CBC to ensure no significant blood loss.  We discussed doing a repeat CT scan for the possible  suggestion of mass in the abdomen and patient does not want to pursue this.  Given the overall clinical picture, I do not think that is unreasonable at all.  Patient has developed significant bilateral lower extremity edema after valsartan HCT was discontinued and she was placed on plain valsartan 160 mg/day.  I reviewed the medical record and patient's sodium was only slightly low while taking the valsartan HCT.  I think her metabolic/electrolyte abnormalities were more related to her acute illness.  I think it would be safe to restart the valsartan HCT.     Note that we were unable to obtain patient's blood pressure today.  She was having quite a bit of pain from pumping up to cuff.    Plan is as follows: Restart Eliquis 2.5 mg twice daily.  Discontinue valsartan and restart valsartan /12.5.  Check lab work today including CMP and CBC.  I will also check thyroid function tests to better evaluate for subclinical hypothyroidism.  Hypothyroidism could be contributing to her fluid retention/lower extremity edema.  Patient will follow-up on phone for the results and possible further instructions.  I will have her follow-up in about 3 to 4 weeks to reassess the swelling and the blood pressure.    Procedures

## 2021-03-11 NOTE — OUTREACH NOTE
Care Plan Note      Responses   Annual Wellness Visit:   Patient Has Completed   Care Gaps Addressed  Flu Shot, Pneumonia Vaccine   Flu Shot Status  Up to Date   Pneumonia Vaccine Status  Up to Date   Other Patient Education/Resources   24/7 [facility] Nurse Call Line, Advanced Care Planning, MyChart, Home Healthcare, Virtual Visit   24/7 Nurse Call Line Education Method  Verbal   ACP Education Method  Verbal [Completed]   Home Healthcare Education Method  Verbal   MyChart Education Method  Verbal [Active]   Virtual Visit Education Method  Verbal   Does patient have depression diagnosis?  No   Advanced Directives:  Patient Has   Ed Visits past 12 months:  1   Hospitalizations past 12 months  1   Medication Adherence  Medications understood [Withe assistance from daughter ]        The main concerns and/or symptoms the patient would like to address are: Talked with patient's daughter. Discussed 3/10/21 ED visit regarding skin tear and contusion to left lower leg. Daughter states patient compliant with ED recommendations; dressing to left lower leg dry and intact this morning and will contact PCP for further recommendations. Daughter states dressing change done last night; dressing wet with clear fluid drainage; no redness; warmth or fever noted; and keeping leg elevated.    Patient lives with daughter; receiving assistance with ADL's; meal preparation; transportation and ambulates short distance  with walker.Patient uses [facility]  Daughter states patient has weakness to knees; legs and does not get up much. She reports patient has decrease in appetite; has attempted Boost supplemental drink but patient does not like. She reports patient has had difficulty with sleeping .Has had no difficulty with nausea; vomiting or constipation. With daughter's assistance patient is compliant with medications and medical appointments.       Education/instruction provided by Care Coordinator: Reviewed with patient's daughter  ED  AVS recommendations; home health and caregiver services;  education provided; fall; safety and bleeding precautions; s/sx of infection;hydration; COVID 19 precautions; 24/7 Nurse Line Telephone number; ACM contact information; Advance Directives; My Chart; gaps in care; MWV and Case Management services. Daughter  verbalized understanding and states to appreciate phone call. Declines further outreach at this time.  No further questions or concerns voiced at this time.     Follow Up Outreach Due: Follow up as needed.     Christina Cr RN  Ambulatory     3/11/2021, 10:31 EST

## 2021-03-11 NOTE — ED NOTES
getting out of bed ran into the post with her left shin has a skin tear was seen at [facility] and sent here for further eval. Pt Is on eliquis  Pt in mask in triage   Triage in appropriate PPE      Rosanne Randolph RN  03/10/21 1921

## 2021-03-11 NOTE — DISCHARGE INSTRUCTIONS
Keep wound clean and dry, Tylenol for pain control as needed, ice for pain and swelling, PCP follow-up for recheck as needed, ED return for worsening symptoms as needed.

## 2021-03-11 NOTE — ED PROVIDER NOTES
EMERGENCY DEPARTMENT ENCOUNTER    Room Number:  28/28  Date of encounter:  3/10/2021  PCP: Adilson Mix MD  Historian: Patient, son      HPI:  Chief Complaint: Left lower leg injury  A complete HPI/ROS/PMH/PSH/SH/FH are unobtainable due to: None    Context: Lora Serrano is a 89 y.o. female who presents to the ED via private vehicle from urgent care for evaluation after she cut open her anterior left shin on the edge of the bed earlier today.  She does take Eliquis and there was some initial bleeding issues but this is since resolved.  Was sent over here for x-rays and wound care.  No other injuries or concerns at this time.      MEDICAL RECORD REVIEW    Chart review does confirm Eliquis as one of her medications.    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 01/01/2003   • Moderate mitral regurgitation, 02/18/2012--moderate MR, EF 58%. 01/18/2012   • History of permanent cardiac PM, 2000--dual-chamber PM for SSS, A flutter, AVB after CABG. 2006--PM replacement.  Medtronic EnRhythm [id number]. 11/30/2015--PM generator change. 11/30/2015   • Primary osteoarthritis of both knees 09/28/2016   • Statin intolerance 03/28/2017   • Hyponatremia 02/28/2018   • Mitral valve annular calcification 07/16/2019   • Chronic anticoagulation, Eliquis for permanent atrial fibrillation 08/05/2020   • Permanent atrial fibrillation (CMS/[facility])  11/05/2020   • Acute diverticulitis 02/23/2021   • Laceration of occipital scalp 02/23/2021   • Post-concussion vertigo 02/25/2021   • Hospital discharge follow-up 03/05/2021     Resolved Ambulatory Problems     Diagnosis Date Noted   • History of acute pharyngitis 03/14/2016   • History of Acute upper respiratory infection 03/14/2016   • History of osteoporosis, June 2001 osteoporosis of the LS spine.  2011--improved to osteopenia only. 03/14/2016   • History of acute bronchitis 03/14/2016   • History of anemia 03/14/2016   • History of complete AV block, 2000--dual-chamber PM for SSS, A flutter, AVB after CABG. 2006--PM replacement.  Medtronic EnRhythm [id number]. 11/30/2015--PM generator change. 12/18/2000   • History of drug rash, 01/11/2014--name brand Synthroid 03/14/2016   • History of bone density study 03/14/2016   • History of carotid Doppler/vascular screen. 03/14/2016   • History of echocardiogram 03/14/2016   • History of mammogram 03/14/2016   • History of carpal tunnel syndrome, bilateral 03/14/2016   • History of pneumococcal vaccination 03/14/2016   • Questionable History of polymyalgia rheumatica 03/14/2016   • Status post catheter ablation of atrial flutter, 2002--successful catheter ablation for persistent atrial flutter. 01/01/2003   • History of chest x-ray 09/28/2016   • Chronic cough 09/28/2016   • History of Zostavax administration 01/17/2008   • Hospital discharge follow-up 02/28/2018   • Episodic lightheadedness 02/28/2018   • New onset of headaches after age 50 03/21/2018   • Need for influenza vaccination 09/28/2018   • Left lower lobe pulmonary nodule 12/31/2018   • Exposure to influenza 02/04/2020   • Flu-like symptoms 02/04/2020   • Dry cough 02/04/2020   • Paroxysmal atrial fibrillation (CMS/HCC) 04/15/2020   • Hypokalemia 02/23/2021   • Hyponatremia 02/23/2021     Past Medical History:   Diagnosis Date   • Hyperlipidemia 3/14/2016         PAST SURGICAL HISTORY  Past Surgical History:    Procedure Laterality Date   • CARDIAC ABLATION  07/02/2003 07/02/2003--catheter ablation for persistent atrial flutter was successful.   • CARDIAC PACEMAKER PLACEMENT  12/18/2000 12/18/2000--dual-chamber pacemaker placement for sick sinus syndrome, atrial flutter, AV block that occurred after her bypass surgery.   • CARPAL TUNNEL RELEASE Right 01/2010 January 2010--status post right carpal tunnel release. No surgical treatment on left.   • CORONARY ARTERY BYPASS GRAFT  12/2000 December 2000--coronary artery bypass x 5   • HX OVARIAN CYSTECTOMY  Remote    Remote ovarian cyst excision.   • INCISIONAL BREAST BIOPSY  Remote    Benign   • PACEMAKER REPLACEMENT  10/26/2006    10/26/2006--status post pacemaker replacement. Medtronic EnRhythm [id number]    • PACEMAKER REPLACEMENT  11/30/2015 11/30/2015--pacemaker generator change.         FAMILY HISTORY  Family History   Problem Relation Age of Onset   • Hyperlipidemia Mother    • Breast cancer Sister    • Diabetes type II Sister          SOCIAL HISTORY  Social History     Socioeconomic History   • Marital status:      Spouse name: Not on file   • Number of children: 2   • Years of education: Not on file   • Highest education level: 12th grade   Tobacco Use   • Smoking status: Never Smoker   • Smokeless tobacco: Never Used   Vaping Use   • Vaping Use: Never used   Substance and Sexual Activity   • Alcohol use: No   • Drug use: No   • Sexual activity: Not Currently     Partners: Male         ALLERGIES  Atorvastatin, Levothyroxine sodium, Other, and Penicillins        REVIEW OF SYSTEMS  Review of Systems     All systems reviewed and negative except for those discussed in HPI.       PHYSICAL EXAM    I have reviewed the triage vital signs and nursing notes.    ED Triage Vitals [03/10/21 1925]   Temp Heart Rate Resp BP SpO2   97.1 °F (36.2 °C) 60 18 -- 92 %      Temp src Heart Rate Source Patient Position BP Location FiO2 (%)   Tympanic Monitor -- -- --        Physical Exam  General: Awake, alert, no acute distress  HEENT: EOMI  Pulm: Symmetric chest rise, nonlabored breathing  CV: Regular rate and rhythm  GI: Non-distended  MSK: No deformity  Skin: Warm, dry, superficial skin tear noted in the anterior distal left lower leg with no significant active bleeding.  Neuro: Alert and oriented x 3, moving all extremities, no focal deficits  Psych: Calm, cooperative    Vital signs and nursing notes reviewed.       Surgical mask, protective eye goggles, and gloves used during this encounter. Patient in surgical mask.      LAB RESULTS  No results found for this or any previous visit (from the past 24 hour(s)).    Ordered the above labs and independently reviewed the results.        RADIOLOGY  XR Tibia Fibula 2 View Left    Result Date: 3/10/2021  XR TIBIA FIBULA 2 VW LEFT-  INDICATIONS: Trauma  TECHNIQUE: Frontal and lateral views of the left lower leg  COMPARISON: None available  FINDINGS:  No acute fracture, erosion, or dislocation is identified. Degenerative changes are seen at the knee. Soft tissue swelling is seen at the ankle. Arterial calcifications are present. Follow-up/further evaluation can be obtained as indications persist.       As described.    This report was finalized on 3/10/2021 7:57 PM by Dr. Mitchell Portillo M.D.        I ordered the above noted radiological studies. Reviewed by me.  See dictation for official radiology interpretation.      PROCEDURES    Procedures      MEDICATIONS GIVEN IN ER    Medications   acetaminophen (TYLENOL) tablet 1,000 mg (1,000 mg Oral Not Given 3/10/21 1956)         PROGRESS, DATA ANALYSIS, CONSULTS, AND MEDICAL DECISION MAKING    All labs have been independently reviewed by me.  All radiology studies have been reviewed by me and discussed with radiologist dictating the report.   EKG's independently viewed and interpreted by me.  Discussion below represents my analysis of pertinent findings related to patient's condition,  differential diagnosis, treatment plan and final disposition.    We will get x-rays to eval for any underlying fractures by think this unlikely.  Skin tear is superficial does not require any type of suture repair.  Plan for appropriate dressing, Tylenol for pain, PCP follow-up, ED return for worsening symptoms as needed.         AS OF 20:08 EST VITALS:    BP - 175/69  HR - 60  TEMP - 97.1 °F (36.2 °C) (Tympanic)  02 SATS - 93%        DIAGNOSIS  Final diagnoses:   Skin tear of lower leg without complication, left, initial encounter   Contusion of left lower leg, initial encounter   Chronic anticoagulation         DISPOSITION  DISCHARGE    Patient discharged in stable condition.    Reviewed implications of results, diagnosis, meds, responsibility to follow up, warning signs and symptoms of possible worsening, potential complications and reasons to return to ER.    Patient/Family voiced understanding of above instructions.    Discussed plan for discharge, as there is no emergent indication for admission. Patient referred to primary care provider for BP management due to today's BP. Pt/family is agreeable and understands need for follow up and repeat testing.  Pt is aware that discharge does not mean that nothing is wrong but it indicates no emergency is present that requires admission and they must continue care with follow-up as given below or physician of their choice.     FOLLOW-UP  [facility] Emergency Department  [address]  [facility] [address] [phone]    As needed, If symptoms worsen    Adilson Mix MD  [address] [facility] [address]  [place] [id number] [phone]    Schedule an appointment as soon as possible for a visit   As needed         Medication List      No changes were made to your prescriptions during this visit.                    Renzo Collier MD  03/10/21 2009

## 2021-03-19 NOTE — ED NOTES
Patient's daughter Maria Teresa is at the bedside. She reports that patient has not been acting her usual self today. States that patient was seen for a head injury about a month ago, and has not gotten back to her baseline but still talks to her and is fully oriented. Patient is alert and oriented x 2 at this time. Disoriented to time. Patient denies any pain. Patient states she is short of breath, edema in bilateral ankles and feet. Daughter states she has been eating and drinking, and ambulates around the home with a walker.     Hortensia Corona, RN  03/19/21 1949

## 2021-03-19 NOTE — ED TRIAGE NOTES
Pt family reports pt has been complaining of nausea, fatigue, dizziness since seen in ER 2/23/2021 and worsening, pt family states today pt was not able to answer questions.     Patient masked in first look triage. I was wearing mask and goggles.

## 2021-03-19 NOTE — TELEPHONE ENCOUNTER
Pt's daughter called to request Taylor to give her some updates about the [facility] paperwork she left at the office a couple of weeks ago. I called the office and was told Dr Mix is not in the office today.    Mrs Wood would like a call back to get some kind of update    [phone]

## 2021-03-20 NOTE — ED PROVIDER NOTES
EMERGENCY DEPARTMENT ENCOUNTER    Room Number:  39/39  Date seen:  3/20/2021  PCP: Adilson Mix MD  Historian: Patient, daughter      HPI:  Chief Complaint: Generalized weakness, transient confusion  A complete HPI/ROS/PMH/PSH/SH/FH are unobtainable due to: Nothing  Context: Lora Serrano is a 89 y.o. female who presents to the ED c/o generalized weakness and an episode of transient confusion today.  Daughter reports that patient seemed to have difficulty getting her words out this afternoon.  It lasted approximately 3 hours and then resolved.  She reports that she has been having these episodes frequently, particularly in the afternoon.  She reports that patient has really not been right since recently being admitted for diverticulitis.  At that time, EMS personnel dropped the patient when transporting her and she hit the back of her head.  Recently she is also had some mild nausea and has been increasingly fatigued.  Prior to that hospitalization she has been walking more but recently is mostly bedridden and only transfers to the bedside commode and wheelchair periodically.  She denies fever or chills.  She denies dysuria.  She has had no vomiting or diarrhea.            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 01/01/2003   • Moderate mitral regurgitation, 02/18/2012--moderate MR, EF 58%. 01/18/2012   • History of  permanent cardiac PM, 2000--dual-chamber PM for SSS, A flutter, AVB after CABG. 2006--PM replacement.  Medtronic EnRhythm [id number]. 11/30/2015--PM generator change. 11/30/2015   • Primary osteoarthritis of both knees 09/28/2016   • Statin intolerance 03/28/2017   • Hyponatremia 02/28/2018   • Mitral valve annular calcification 07/16/2019   • Chronic anticoagulation, Eliquis for permanent atrial fibrillation 08/05/2020   • Permanent atrial fibrillation (CMS/HCC) 11/05/2020   • Acute diverticulitis 02/23/2021   • Laceration of occipital scalp 02/23/2021   • Post-concussion vertigo 02/25/2021   • Hospital discharge follow-up 03/05/2021     Resolved Ambulatory Problems     Diagnosis Date Noted   • History of acute pharyngitis 03/14/2016   • History of Acute upper respiratory infection 03/14/2016   • History of osteoporosis, June 2001 osteoporosis of the LS spine.  2011--improved to osteopenia only. 03/14/2016   • History of acute bronchitis 03/14/2016   • History of anemia 03/14/2016   • History of complete AV block, 2000--dual-chamber PM for SSS, A flutter, AVB after CABG. 2006--PM replacement.  Medtronic EnRhythm [id number]. 11/30/2015--PM generator change. 12/18/2000   • History of drug rash, 01/11/2014--name brand Synthroid 03/14/2016   • History of bone density study 03/14/2016   • History of carotid Doppler/vascular screen. 03/14/2016   • History of echocardiogram 03/14/2016   • History of mammogram 03/14/2016   • History of carpal tunnel syndrome, bilateral 03/14/2016   • History of pneumococcal vaccination 03/14/2016   • Questionable History of polymyalgia rheumatica 03/14/2016   • Status post catheter ablation of atrial flutter, 2002--successful catheter ablation for persistent atrial flutter. 01/01/2003   • History of chest x-ray 09/28/2016   • Chronic cough 09/28/2016   • History of Zostavax administration 01/17/2008   • Hospital discharge follow-up 02/28/2018   • Episodic lightheadedness 02/28/2018   • New onset  of headaches after age 50 03/21/2018   • Need for influenza vaccination 09/28/2018   • Left lower lobe pulmonary nodule 12/31/2018   • Exposure to influenza 02/04/2020   • Flu-like symptoms 02/04/2020   • Dry cough 02/04/2020   • Paroxysmal atrial fibrillation (CMS/HCC) 04/15/2020   • Hypokalemia 02/23/2021   • Hyponatremia 02/23/2021     Past Medical History:   Diagnosis Date   • Hyperlipidemia 3/14/2016         PAST SURGICAL HISTORY  Past Surgical History:   Procedure Laterality Date   • CARDIAC ABLATION  07/02/2003 07/02/2003--catheter ablation for persistent atrial flutter was successful.   • CARDIAC PACEMAKER PLACEMENT  12/18/2000 12/18/2000--dual-chamber pacemaker placement for sick sinus syndrome, atrial flutter, AV block that occurred after her bypass surgery.   • CARPAL TUNNEL RELEASE Right 01/2010 January 2010--status post right carpal tunnel release. No surgical treatment on left.   • CORONARY ARTERY BYPASS GRAFT  12/2000 December 2000--coronary artery bypass x 5   • HX OVARIAN CYSTECTOMY  Remote    Remote ovarian cyst excision.   • INCISIONAL BREAST BIOPSY  Remote    Benign   • PACEMAKER REPLACEMENT  10/26/2006    10/26/2006--status post pacemaker replacement. Medtronic EnRhythm [id number]    • PACEMAKER REPLACEMENT  11/30/2015 11/30/2015--pacemaker generator change.         FAMILY HISTORY  Family History   Problem Relation Age of Onset   • Hyperlipidemia Mother    • Breast cancer Sister    • Diabetes type II Sister          SOCIAL HISTORY  Social History     Socioeconomic History   • Marital status:      Spouse name: Not on file   • Number of children: 2   • Years of education: Not on file   • Highest education level: 12th grade   Tobacco Use   • Smoking status: Never Smoker   • Smokeless tobacco: Never Used   Vaping Use   • Vaping Use: Never used   Substance and Sexual Activity   • Alcohol use: No   • Drug use: No   • Sexual activity: Not Currently     Partners: Male          ALLERGIES  Atorvastatin, Levothyroxine sodium, Other, and Penicillins        REVIEW OF SYSTEMS  Review of Systems   Review of all 14 systems is negative other than stated in the HPI above.      PHYSICAL EXAM  ED Triage Vitals [03/19/21 1818]   Temp Heart Rate Resp BP SpO2   96.4 °F (35.8 °C) 68 18 (!) 185/65 91 %      Temp src Heart Rate Source Patient Position BP Location FiO2 (%)   Tympanic Monitor -- -- --         GENERAL: Awake and alert, no acute distress  HENT: nares patent, no scalp hematoma  EYES: no scleral icterus, pupils 2 mm reactive bilaterally  CV: regular rhythm, normal rate  RESPIRATORY: normal effort, lungs clear to auscultation bilaterally  ABDOMEN: soft, nondistended, nontender throughout  MUSCULOSKELETAL: no deformity  NEURO: alert, moves all extremities, follows commands  PSYCH:  calm, cooperative  SKIN: warm, dry.  There is a skin tear on the anterior left leg with no surrounding erythema, no purulent drainage.    Vital signs and nursing notes reviewed.          LAB RESULTS  Recent Results (from the past 24 hour(s))   Comprehensive Metabolic Panel    Collection Time: 03/19/21  6:35 PM    Specimen: Blood   Result Value Ref Range    Glucose 125 (H) 65 - 99 mg/dL    BUN 12 8 - 23 mg/dL    Creatinine 0.54 (L) 0.57 - 1.00 mg/dL    Sodium 132 (L) 136 - 145 mmol/L    Potassium 3.5 3.5 - 5.2 mmol/L    Chloride 93 (L) 98 - 107 mmol/L    CO2 27.4 22.0 - 29.0 mmol/L    Calcium 8.7 8.2 - 9.6 mg/dL    Total Protein 5.8 (L) 6.0 - 8.5 g/dL    Albumin 3.40 (L) 3.50 - 5.20 g/dL    ALT (SGPT) 12 1 - 33 U/L    AST (SGOT) 12 1 - 32 U/L    Alkaline Phosphatase 66 39 - 117 U/L    Total Bilirubin 0.7 0.0 - 1.2 mg/dL    eGFR Non African Amer 105 >60 mL/min/1.73    Globulin 2.4 gm/dL    A/G Ratio 1.4 g/dL    BUN/Creatinine Ratio 22.2 7.0 - 25.0    Anion Gap 11.6 5.0 - 15.0 mmol/L   CBC Auto Differential    Collection Time: 03/19/21  6:35 PM    Specimen: Blood   Result Value Ref Range    WBC 9.21 3.40 - 10.80  10*3/mm3    RBC 4.13 3.77 - 5.28 10*6/mm3    Hemoglobin 11.9 (L) 12.0 - 15.9 g/dL    Hematocrit 35.5 34.0 - 46.6 %    MCV 86.0 79.0 - 97.0 fL    MCH 28.8 26.6 - 33.0 pg    MCHC 33.5 31.5 - 35.7 g/dL    RDW 14.5 12.3 - 15.4 %    RDW-SD 44.9 37.0 - 54.0 fl    MPV 9.0 6.0 - 12.0 fL    Platelets 349 140 - 450 10*3/mm3    Neutrophil % 80.9 (H) 42.7 - 76.0 %    Lymphocyte % 10.2 (L) 19.6 - 45.3 %    Monocyte % 7.9 5.0 - 12.0 %    Eosinophil % 0.3 0.3 - 6.2 %    Basophil % 0.3 0.0 - 1.5 %    Immature Grans % 0.4 0.0 - 0.5 %    Neutrophils, Absolute 7.44 (H) 1.70 - 7.00 10*3/mm3    Lymphocytes, Absolute 0.94 0.70 - 3.10 10*3/mm3    Monocytes, Absolute 0.73 0.10 - 0.90 10*3/mm3    Eosinophils, Absolute 0.03 0.00 - 0.40 10*3/mm3    Basophils, Absolute 0.03 0.00 - 0.20 10*3/mm3    Immature Grans, Absolute 0.04 0.00 - 0.05 10*3/mm3    nRBC 0.0 0.0 - 0.2 /100 WBC   Urinalysis With Microscopic If Indicated (No Culture) - Urine, Catheter    Collection Time: 03/19/21  7:25 PM    Specimen: Urine, Catheter   Result Value Ref Range    Color, UA Yellow Yellow, Straw    Appearance, UA Turbid (A) Clear    pH, UA 8.0 5.0 - 8.0    Specific Gravity, UA 1.019 1.005 - 1.030    Glucose, UA Negative Negative    Ketones, UA 15 mg/dL (1+) (A) Negative    Bilirubin, UA Negative Negative    Blood, UA Trace (A) Negative    Protein, UA 30 mg/dL (1+) (A) Negative    Leuk Esterase, UA Moderate (2+) (A) Negative    Nitrite, UA Negative Negative    Urobilinogen, UA 1.0 E.U./dL 0.2 - 1.0 E.U./dL   Urinalysis, Microscopic Only - Urine, Catheter    Collection Time: 03/19/21  7:25 PM    Specimen: Urine, Catheter   Result Value Ref Range    RBC, UA None Seen None Seen, 0-2 /HPF    WBC, UA 3-5 (A) None Seen, 0-2 /HPF    Bacteria, UA None Seen None Seen /HPF    Squamous Epithelial Cells, UA 7-12 (A) None Seen, 0-2 /HPF    Hyaline Casts, UA None Seen None Seen /LPF    Amorphous Crystals, UA Moderate/2+ None Seen /HPF    Methodology Manual Light Microscopy         Ordered the above labs and reviewed the results.        RADIOLOGY  CT Head Without Contrast    Result Date: 3/19/2021  CT OF THE HEAD WITHOUT CONTRAST  HISTORY: Altered mental status. HISTORY of head trauma.  COMPARISON: 02/24/2021  TECHNIQUE: Axial CT imaging was obtained through brain. No IV contrast was administered.  FINDINGS: No acute intracranial hemorrhage is seen. There is diffuse atrophy. There is periventricular and deep white matter microangiopathic change. There is no midline shift or mass effect. No calvarial fracture is seen. Paranasal sinuses and mastoid air cells appear clear.      No acute intracranial findings.  Radiation dose reduction techniques were utilized, including automated exposure control and exposure modulation based on body size.  This report was finalized on 3/19/2021 9:22 PM by Dr. Shanna Courtney M.D.      XR Chest 1 View    Result Date: 3/19/2021  ONE VIEW PORTABLE CHEST  HISTORY: Dizziness. Shortness of breath.  FINDINGS: There is cardiomegaly with a pacemaker in place and this is unchanged from 02/25/2021. There is now increased density at the left base which is new and appears to represent a combination of pleural effusion and atelectasis. I cannot completely exclude some associated pneumonia. The lungs are otherwise clear.        Ordered the above noted radiological studies. Reviewed by me in PACS.            PROCEDURES  Procedures          MEDICATIONS GIVEN IN ER  Medications - No data to display                MEDICAL DECISION MAKING, PROGRESS, and CONSULTS    All labs have been independently reviewed by me.  All radiology studies have been reviewed by me and discussed with radiologist dictating the report.   EKG's independently viewed and interpreted by me.  Discussion below represents my analysis of pertinent findings related to patient's condition, differential diagnosis, treatment plan and final disposition.    ED Course as of Mar 20 0020   Fri Mar 19, 2021   2122  Chest x-ray independently interpreted in PACS.  There is a pacemaker in place and there is mild cardiomegaly.  The left lower lung fields are incompletely visualized due to cardiomegaly.    [JR]   2123 CT brain without contrast independently interpreted in PACS.  I see no evidence of intracranial hemorrhage.    [JR]   2134 Patient did have O2 saturation of 91% on room air upon arrival.  She was briefly on oxygen therapy however I turned this off and she has maintained greater than 94% on room air.  Labs today reviewed and reassuring.  There is no evidence of urinary tract infection.  She does have stable anemia and also stable, mild hyponatremia at 132.    [JR]   2135 I explained to patient and family that although her transient confusion today may have been indicative of a TIA, we are unable to get MRI due to her pacemaker and I am not sure that admission would  as she is already anticoagulated on Eliquis.  I did offer admission however they prefer to go home and follow-up with her PCP.    [JR]   Sat Mar 20, 2021   0016 Nitrite, UA: Negative [JR]   0018 WBC, UA(!): 3-5 [JR]   0018 Glucose(!): 125 [JR]   0018 Creatinine(!): 0.54 [JR]   0018 Sodium(!): 132 [JR]      ED Course User Index  [JR] Jovan Melton MD              I wore a mask, face shield, and gloves during this patient encounter.  Patient also wearing a surgical mask.  Hand hygeine performed before and after seeing the patient.    DIAGNOSIS  Final diagnoses:   Transient confusion   Generalized weakness   Permanent atrial fibrillation (CMS/HCC)         DISPOSITION  DISCHARGE    Patient discharged in stable condition.    Reviewed implications of results, diagnosis, meds, responsibility to follow up, warning signs and symptoms of possible worsening, potential complications and reasons to return to ER.    Patient/Family voiced understanding of above instructions.    Discussed plan for discharge, as there is no emergent indication for  admission. Patient referred to primary care provider for BP management due to today's BP. Pt/family is agreeable and understands need for follow up and repeat testing.  Pt is aware that discharge does not mean that nothing is wrong but it indicates no emergency is present that requires admission and they must continue care with follow-up as given below or physician of their choice.     FOLLOW-UP  Adilson Mix MD  [id number] [id number]  [phone]    Schedule an appointment as soon as possible for a visit            Medication List      No changes were made to your prescriptions during this visit.                   Latest Documented Vital Signs:  As of 00:18 EDT  BP- 173/63 HR- 60 Temp- 96.4 °F (35.8 °C) (Tympanic) O2 sat- 96%        --    Please note that portions of this were completed with a voice recognition program.            Jovan Melton MD  03/20/21 0022

## 2021-04-07 PROBLEM — F07.81 POST-CONCUSSION VERTIGO: Status: RESOLVED | Noted: 2021-01-01 | Resolved: 2021-01-01

## 2021-04-07 PROBLEM — R42 POST-CONCUSSION VERTIGO: Status: RESOLVED | Noted: 2021-01-01 | Resolved: 2021-01-01

## 2021-04-07 PROBLEM — Z09 HOSPITAL DISCHARGE FOLLOW-UP: Status: RESOLVED | Noted: 2021-01-01 | Resolved: 2021-01-01

## 2021-04-07 PROBLEM — S01.01XA LACERATION OF OCCIPITAL SCALP: Status: RESOLVED | Noted: 2021-01-01 | Resolved: 2021-01-01

## 2021-04-07 PROBLEM — Z87.19 HISTORY OF DIVERTICULITIS OF COLON: Status: ACTIVE | Noted: 2021-01-01

## 2021-04-07 PROBLEM — R60.0 BILATERAL LOWER EXTREMITY EDEMA: Status: ACTIVE | Noted: 2021-01-01

## 2021-04-07 NOTE — PROGRESS NOTES
04/07/2021    Patient Information  Lora Serrano                                                                                          [address] [facility] [address]      7/15/1926  [unfilled]  There is no work phone number on file.    Chief Complaint:     Follow-up recent history of diverticulitis of colon.  Follow-up low sodium, hypertension, lower extremity swelling, hypothyroidism.  No new acute complaints.    History of Present Illness:    Patient with a history of medical problems as outlined in chief complaint as well as a history of occlusive coronary artery disease, impaired fasting glucose, osteopenia, vitamin D deficiency, hyperlipidemia, history of atrial flutter, status post permanent pacemaker for sick sinus syndrome, moderate mitral regurgitation, primary osteoarthritis, statin intolerance, chronic anticoagulation on Eliquis for permanent atrial fibrillation.  She presents today to follow-up after a hospital stay for acute diverticulitis.  At the time of the last visit patient was not feeling back to baseline.  She was complaining of lower extremity swelling and I ordered some lab work including thyroid function test which we have reviewed and will review again today.  Past medical history reviewed and updated were necessary including health maintenance parameters.  This reveals she is up-to-date or else accounted for.    Review of Systems   Constitutional: Negative.   HENT: Negative.    Eyes: Negative.    Cardiovascular: Negative.    Respiratory: Negative.    Endocrine: Negative.    Hematologic/Lymphatic: Negative.    Skin: Negative.    Musculoskeletal: Positive for arthritis and joint pain.   Gastrointestinal: Negative.    Genitourinary: Negative.    Neurological: Negative.    Psychiatric/Behavioral: Negative.    Allergic/Immunologic: Negative.        Active Problems:    Patient Active Problem List   Diagnosis   • Occlusive coronary artery disease, 12/11/2000--status post 5  vessel CABG   • Primary hypothyroidism   • Impaired fasting glucose   • Onychomycosis of fingernails   • Osteopenia of multiple sites   • Vitamin D deficiency   • Hyperlipidemia   • Benign essential hypertension   • Generalized osteoarthritis of multiple sites   • Therapeutic drug monitoring   • History of atrial flutter, 2003--successful catheter ablation for persistent atrial flutter.   • Moderate mitral regurgitation, 02/18/2012--moderate MR, EF 58%.   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 2006--PM replacement.  Medtronic EnRhythm [id number]. 11/30/2015--PM generator change.   • Primary osteoarthritis of both knees   • Statin intolerance   • Hyponatremia   • Mitral valve annular calcification   • Chronic anticoagulation, Eliquis for permanent atrial fibrillation   • Permanent atrial fibrillation (CMS/HCC)   • History of diverticulitis of colon   • Bilateral lower extremity edema         Past Medical History:   Diagnosis Date   • Benign essential hypertension 3/14/2016   • Chronic anticoagulation, Eliquis for permanent atrial fibrillation 8/5/2020   • Generalized osteoarthritis of multiple sites 3/14/2016   • History of atrial flutter, 2003--successful catheter ablation for persistent atrial flutter. 2003 2003--catheter ablation for persistent atrial flutter was successful.   • History of complete AV block, 10/26/2006--permanent pacemaker replacement.  12/18/2000--dual-chamber pacemaker placement for sick sinus syndrome, atrial flutter, AV block after CABG. 12/18/2000 12/18/2000--dual-chamber pacemaker placement for sick sinus syndrome, atrial flutter, AV block that occurred after her bypass surgery. 10/26/2006--status post pacemaker replacement. Medtronic EnRhythm [id number]   • History of drug rash, 01/11/2014--name brand Synthroid 01/11/2014 01/11/2014--patient presented with a diffuse erythematous rash that was very pruritic. Examination consistent with  a drug reaction/allergic dermatitis. Believed to be secondary to name brand Synthroid. This was discontinued and patient treated with a Medrol Dosepak.   • History of osteoporosis, June 2001 osteoporosis of the LS spine.  2011--improved to osteopenia only. 2011,2001 01/31/2011--DEXA scan  revealed LS spine T score -2.2, left hip -1.0.  Now osteopenia.   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 2006--PM replacement.  Medtronic EnRhythm [id number]. 11/30/2015--PM generator change. 11/30/2015 11/30/2015--pacemaker generator change.  10/26/2006--status post pacemaker replacement. Medtronic EnRhythm [id number]  12/18/2000--dual-chamber pacemaker placement for sick sinus syndrome, atrial flutter, AV block that occurred after her bypass surgery.   2000--dual-chamber PM for SSS, A flutter, AVB after CABG. 2006--PM replacement.  Medtronic EnRhythm [id number]. 11/30/2015--PM generator change.   • Hyperlipidemia 3/14/2016   • Impaired fasting glucose 3/16/2016    03/16/2015--type 2 diabetes is now evolved into impaired fasting glucose.  Hemoglobin A1c 5.7.  Diagnosed January 2010   • Mitral valve annular calcification 7/16/2019    January 15, 2019--CT scan of the chest without contrast reveals nodular calcification within the mitral valve annulus that accounts for a nodular opacity in the left lower lung zone shown on the patient's recent chest x-ray.  No pulmonary nodules or infiltrates are currently identified.  Mild cardiomegaly.  Small fixed hiatal hernia.  01/15/2019--patient seen in follow-up and reports her lower res   • Moderate mitral regurgitation, 02/18/2012--moderate MR, EF 58%. 1/18/2012 01/18/2012 revealed ejection fraction of 58%, moderate mitral regurgitation.   • Occlusive coronary artery disease, 12/11/2000--status post 5 vessel CABG 12/11/2000 12/11/2000--status post 5 vessel CABG.  Patient  subsequently had chest pain from her sternal wires which were removed.   • Onychomycosis of fingernails 3/14/2016    Patient has had onychomycosis involving her fingernails for several years.  She tries to control it with Ertaczo cream.   • Osteopenia of multiple sites 6/1/2001 01/31/2011--DEXA scan  revealed LS spine T score -2.2, left hip -1.0.  Now osteopenia.   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   • Permanent atrial fibrillation (CMS/HCC) 11/5/2020   • Primary osteoarthritis of both knees 9/28/2016 09/28/2016--patient seen in follow-up and continues to have bilateral knee discomfort particularly with prolonged use.  Right knee is worse than the left.  She understandably does not want have a knee replacement but I think she would benefit from orthopedic evaluation for consideration of cortisone or visco supplementation.  Patient referred to Dr. Wallace.  08/26/2016--x-ray of the right knee reveals very severe degenerative changes involving the medial compartment with marked joint space narrowing and subchondral sclerosis and marginal spurring.  No joint effusion and no acute pathology.   • Statin intolerance 3/28/2017    Intolerance to multiple statins.   • Status post catheter ablation of atrial flutter, 2002--successful catheter ablation for persistent atrial flutter. 2003 2003--catheter ablation for persistent atrial flutter was successful.   • Subclinical hypothyroidism 5/9/2013    Diagnosed 05/16/2013.   05/09/2013--ultrasound thyroid for new hypothyroidism revealed a small thyroid gland consistent with hypothyroidism.  Mixed nodule present.   • Vitamin D deficiency 3/14/2016         Past Surgical History:   Procedure Laterality Date   • CARDIAC ABLATION  07/02/2003 07/02/2003--catheter ablation for persistent atrial flutter was successful.   • CARDIAC PACEMAKER PLACEMENT  12/18/2000 12/18/2000--dual-chamber pacemaker placement for sick sinus syndrome, atrial flutter, AV block  that occurred after her bypass surgery.   • CARPAL TUNNEL RELEASE Right 01/2010 January 2010--status post right carpal tunnel release. No surgical treatment on left.   • CORONARY ARTERY BYPASS GRAFT  12/2000 December 2000--coronary artery bypass x 5   • HX OVARIAN CYSTECTOMY  Remote    Remote ovarian cyst excision.   • INCISIONAL BREAST BIOPSY  Remote    Benign   • PACEMAKER REPLACEMENT  10/26/2006    10/26/2006--status post pacemaker replacement. Medtronic EnRhythm [id number]    • PACEMAKER REPLACEMENT  11/30/2015 11/30/2015--pacemaker generator change.         Allergies   Allergen Reactions   • Atorvastatin    • Levothyroxine Sodium    • Other      Seafood   • Penicillins            Current Outpatient Medications:   •  ALPRAZolam (Xanax) 0.5 MG tablet, Take 1/2-1 p.o. nightly for nocturnal anxiety and insomnia., Disp: 30 tablet, Rfl: 1  •  apixaban (Eliquis) 2.5 MG tablet tablet, TAKE ONE TABLET BY MOUTH EVERY 12 HOURS, Disp: 60 tablet, Rfl: 0  •  clotrimazole-betamethasone (LOTRISONE) 1-0.05 % cream, Apply to affected areas 3 times daily as directed, Disp: 45 g, Rfl: 2  •  doxazosin (CARDURA) 2 MG tablet, Take 1 p.o. twice daily for high blood pressure, Disp: 180 tablet, Rfl: 1  •  metoprolol succinate XL (TOPROL-XL) 50 MG 24 hr tablet, TAKE ONE TABLET BY MOUTH EVERY MORNING FOR HIGH BLOOD PRESSURE AND HEART, Disp: 90 tablet, Rfl: 2  •  ondansetron (Zofran) 4 MG tablet, Take 1 p.o. every 6 hours as needed nausea, Disp: 30 tablet, Rfl: 0  •  valsartan-hydrochlorothiazide (DIOVAN-HCT) 160-12.5 MG per tablet, Take 1 p.o. every morning for high blood pressure, Disp: 90 tablet, Rfl: 2  •  Cholecalciferol (VITAMIN D) 1000 UNITS tablet, Take 1 tablet by mouth daily., Disp: , Rfl:   •  Coenzyme Q10 (COQ10) 400 MG capsule, Take 1 capsule by mouth daily. With food, Disp: , Rfl:   •  levothyroxine (Synthroid) 50 MCG tablet, Take 1 p.o. daily for low thyroid, Disp: 90 tablet, Rfl: 1  •  Red Yeast Rice 600 MG tablet,  "Take 2 tablets by mouth every day., Disp: , Rfl:       Family History   Problem Relation Age of Onset   • Hyperlipidemia Mother    • Breast cancer Sister    • Diabetes type II Sister          Social History     Socioeconomic History   • Marital status:      Spouse name: Not on file   • Number of children: 2   • Years of education: Not on file   • Highest education level: 12th grade   Tobacco Use   • Smoking status: Never Smoker   • Smokeless tobacco: Never Used   Vaping Use   • Vaping Use: Never used   Substance and Sexual Activity   • Alcohol use: No   • Drug use: No   • Sexual activity: Not Currently     Partners: Male         Vitals:    04/07/21 1357   BP: 164/54   BP Location: Right arm   Patient Position: Sitting   Cuff Size: Adult   Pulse: 60   SpO2: 98%   Height: 152.4 cm (60\")        Body mass index is 21.48 kg/m².      Physical Exam:    General: Alert and oriented x 3.  No acute distress.  Normal affect.  HEENT: Pupils equal, round, reactive to light; extraocular movements intact; sclerae nonicteric; pharynx, ear canals and TMs normal.  Neck: Without JVD, thyromegaly, bruit, or adenopathy.  Lungs: Clear to auscultation in all fields.  Heart: Regular rate and rhythm without  rub, gallop, or click.  There is a 1/6 to 2/6 systolic murmur in aortic area.  Abdomen: Soft, nontender, without hepatosplenomegaly or hernia.  Bowel sounds normal.  : Deferred.  Rectal: Deferred.  Extremities: Without clubbing, cyanosis,  or pulse deficit.  1+ bilateral lower extremity edema below the knees.  No sign of cellulitis.  Neurologic: Intact without focal deficit.  Normal station and gait observed during ingress and egress from the examination room.  Skin: Without significant lesion.  Musculoskeletal: Unremarkable.    Lab/other results:    I reviewed the lab work from the last visit March 3, 2021: CMP normal except glucose 152, sodium low at 132, BUN low at 7.  Total protein is low at 5.9 and albumin is low at 3.2.  " TSH elevated at 5.13.  Free T4 is normal at 1.36.  Free T3 normal at 2.32.  CBC revealed elevated white count 11.31.  Hemoglobin slightly low 11.4 and hematocrit low at 33.2.    Assessment/Plan:     Diagnosis Plan   1. History of diverticulitis of colon     2. Hyponatremia  Comprehensive Metabolic Panel   3. Benign essential hypertension  doxazosin (CARDURA) 2 MG tablet    Comprehensive Metabolic Panel   4. Bilateral lower extremity edema     5. Primary hypothyroidism  levothyroxine (Synthroid) 50 MCG tablet    TSH    T4, Free    T3, Free   6. Therapeutic drug monitoring  CBC (No Diff)     Patient had a recent episode of diverticulitis and her symptoms have resolved.  She has no further abdominal pain.  She has hyponatremia and this is likely related to her blood pressure medication, specifically valsartan HCT.  She had a CMP March 19, 2021 when she presented to the emergency room and her sodium was low at that time of 132.  She also has a low albumin and signs of protein malnutrition.  I do not feel that she has significant proteinuria.  She did have 1+ protein in her urine when she was at the emergency room March 19, 2021.  We will monitor this closely.  Her blood pressure is back up as expected.  Her lower extremity edema is improved.  She has a history of subclinical hypothyroidism and her TSH is off enough to warrant initiation of thyroid medication.    Plan is as follows: Start levothyroxine 50 mcg/day.  This will likely help with her lower extremity swelling.  Increase Cardura to 4 mg/day.  Patient will obtain nonfasting lab work in 6 weeks and follow-up a few days later to reassess the blood pressure as well as the swelling and her appetite and overall wellbeing.        Procedures

## 2021-04-22 NOTE — TELEPHONE ENCOUNTER
Refill requested for Eliquis 2.5 MG   Last office visit 11/5/2020  Last ekg 11/5/2020  Next office visit 11/8/2021 with pacemaker check

## 2021-04-23 NOTE — TELEPHONE ENCOUNTER
----- Message from Sunita Nur CMA sent at 4/23/2021  8:51 AM EDT -----  Regarding: RE: Prescription Question  Contact: [phone]  Please advise.  Dominguez pt.    ----- Message -----  From: Lora Serrano  Sent: 4/23/2021   8:26 AM EDT  To: Rafael Waller [facility]  Subject: RE: Prescription Question                        not really diarrhea, but she goes 4 or 5 times a day, their is blood in the stool not on the wipe, its not all liquid, its loose small globs, sometimes its one big glob with what looks like clear mucus, she complains about abdominal cramping too, no matter what she eats it always is the same.    do you have any idea about Doxazosih 2mg?  I can't refill it until May 5th and thought I could just increase the dose with what she has but I did not know if I could give 2 in the am together or split in the day

## 2021-05-21 PROBLEM — Z87.19 HISTORY OF DIVERTICULITIS OF COLON: Chronic | Status: ACTIVE | Noted: 2021-01-01

## 2021-05-21 PROBLEM — R60.0 BILATERAL LOWER EXTREMITY EDEMA: Chronic | Status: ACTIVE | Noted: 2021-01-01

## 2021-05-21 PROBLEM — E87.1 HYPONATREMIA: Chronic | Status: ACTIVE | Noted: 2018-02-28

## 2021-05-21 PROBLEM — R82.90 BAD ODOR OF URINE: Status: ACTIVE | Noted: 2021-01-01

## 2021-05-21 NOTE — PROGRESS NOTES
05/21/2021    Patient Information  Lora Serrano                                                                                          [id number] [facility] [id number]      7/15/1926  [unfilled]  There is no work phone number on file.    Chief Complaint:     Follow-up blood work in order to monitor chronic medical issues listed in the history of present illness.  Complaining of odiferous urine.    History of Present Illness:    Patient with history of impaired fasting glucose, hyperlipidemia, statin intolerance, hypertension, primary hypothyroidism, occlusive coronary artery disease, history of atrial flutter status post catheter ablation, history of permanent cardiac pacemaker, mitral valve annular calcification, permanent atrial fibrillation and chronic anticoagulation with Eliquis, lower extremity edema, generalized osteoarthritis, osteopenia, hyponatremia.  She presents today for follow-up with lab prior in order to monitor chronic medical issues.  She has new complaints of a fishy smell to her urine and some discomfort with urination.  Her past medical history reviewed and updated were necessary including health maintenance parameters.  This reveals she is up-to-date or else accounted for.    Review of Systems   Constitutional: Negative. Negative for chills and fever.   HENT: Negative.    Eyes: Negative.    Cardiovascular: Negative.    Respiratory: Negative.    Endocrine: Negative.    Hematologic/Lymphatic: Negative.    Skin: Negative.    Musculoskeletal: Positive for arthritis and joint pain.   Gastrointestinal: Negative.    Genitourinary: Positive for dysuria, frequency and urgency.        Foul smell to urine   Neurological: Negative.    Psychiatric/Behavioral: Negative.    Allergic/Immunologic: Negative.        Active Problems:    Patient Active Problem List   Diagnosis   • Occlusive coronary artery disease, 12/11/2000--status post 5 vessel CABG   • Primary hypothyroidism   • Impaired  fasting glucose   • Onychomycosis of fingernails   • Osteopenia of multiple sites   • Vitamin D deficiency   • Hyperlipidemia   • Benign essential hypertension   • Generalized osteoarthritis of multiple sites   • Therapeutic drug monitoring   • History of atrial flutter, 2003--successful catheter ablation for persistent atrial flutter.   • Moderate mitral regurgitation, 02/18/2012--moderate MR, EF 58%.   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 2006--PM replacement.  Medtronic EnRhythm [id number]. 11/30/2015--PM generator change.   • Primary osteoarthritis of both knees   • Statin intolerance   • Hyponatremia   • Mitral valve annular calcification   • Chronic anticoagulation, Eliquis for permanent atrial fibrillation   • Permanent atrial fibrillation (CMS/HCC)   • History of diverticulitis of colon   • Bilateral lower extremity edema   • Bad odor of urine         Past Medical History:   Diagnosis Date   • Benign essential hypertension 3/14/2016   • Bilateral lower extremity edema 4/7/2021   • Chronic anticoagulation, Eliquis for permanent atrial fibrillation 8/5/2020   • Generalized osteoarthritis of multiple sites 3/14/2016   • History of atrial flutter, 2003--successful catheter ablation for persistent atrial flutter. 2003 2003--catheter ablation for persistent atrial flutter was successful.   • History of complete AV block, 10/26/2006--permanent pacemaker replacement.  12/18/2000--dual-chamber pacemaker placement for sick sinus syndrome, atrial flutter, AV block after CABG. 12/18/2000 12/18/2000--dual-chamber pacemaker placement for sick sinus syndrome, atrial flutter, AV block that occurred after her bypass surgery. 10/26/2006--status post pacemaker replacement. Medtronic EnRhythm [id number]   • History of diverticulitis of colon 2/23/2021 March 5, 2021--patient seen in follow-up by Dr. Mix.  [place] removed.  She reports she is feeling better.  She is  having bowel movements and eating well.  No significant abdominal pain.  The hospital records reviewed at length including emergency room history and physical, admission history and physical, hospital course, discharge summary, radiographic and laboratory studies, discharge medicati   • History of drug rash, 01/11/2014--name brand Synthroid 01/11/2014 01/11/2014--patient presented with a diffuse erythematous rash that was very pruritic. Examination consistent with a drug reaction/allergic dermatitis. Believed to be secondary to name brand Synthroid. This was discontinued and patient treated with a Medrol Dosepak.   • History of osteoporosis, June 2001 osteoporosis of the LS spine.  2011--improved to osteopenia only. 2011,2001 01/31/2011--DEXA scan  revealed LS spine T score -2.2, left hip -1.0.  Now osteopenia.   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 2006--PM replacement.  Medtronic EnRhythm [id number]. 11/30/2015--PM generator change. 11/30/2015 11/30/2015--pacemaker generator change.  10/26/2006--status post pacemaker replacement. Medtronic EnRhythm [id number]  12/18/2000--dual-chamber pacemaker placement for sick sinus syndrome, atrial flutter, AV block that occurred after her bypass surgery.   2000--dual-chamber PM for SSS, A flutter, AVB after CABG. 2006--PM replacement.  Medtronic EnRhythm [id number]. 11/30/2015--PM generator change.   • Hyperlipidemia 3/14/2016   • Impaired fasting glucose 3/16/2016    03/16/2015--type 2 diabetes is now evolved into impaired fasting glucose.  Hemoglobin A1c 5.7.  Diagnosed January 2010   • Mitral valve annular calcification 7/16/2019    January 15, 2019--CT scan of the chest without contrast reveals nodular calcification within the mitral valve annulus that accounts for a nodular opacity in the left lower lung zone shown on the patient's recent chest x-ray.  No  pulmonary nodules or infiltrates are currently identified.  Mild cardiomegaly.  Small fixed hiatal hernia.  01/15/2019--patient seen in follow-up and reports her lower res   • Moderate mitral regurgitation, 02/18/2012--moderate MR, EF 58%. 1/18/2012 01/18/2012 revealed ejection fraction of 58%, moderate mitral regurgitation.   • Occlusive coronary artery disease, 12/11/2000--status post 5 vessel CABG 12/11/2000 12/11/2000--status post 5 vessel CABG.  Patient subsequently had chest pain from her sternal wires which were removed.   • Onychomycosis of fingernails 3/14/2016    Patient has had onychomycosis involving her fingernails for several years.  She tries to control it with Ertaczo cream.   • Osteopenia of multiple sites 6/1/2001 01/31/2011--DEXA scan  revealed LS spine T score -2.2, left hip -1.0.  Now osteopenia.   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   • Permanent atrial fibrillation (CMS/HCC) 11/5/2020   • Primary hypothyroidism 5/9/2013    Diagnosed 05/16/2013.   05/09/2013--ultrasound thyroid for new hypothyroidism revealed a small thyroid gland consistent with hypothyroidism.  Mixed nodule present.   • Primary osteoarthritis of both knees 9/28/2016 09/28/2016--patient seen in follow-up and continues to have bilateral knee discomfort particularly with prolonged use.  Right knee is worse than the left.  She understandably does not want have a knee replacement but I think she would benefit from orthopedic evaluation for consideration of cortisone or visco supplementation.  Patient referred to Dr. Wallace.  08/26/2016--x-ray of the right knee reveals very severe degenerative changes involving the medial compartment with marked joint space narrowing and subchondral sclerosis and marginal spurring.  No joint effusion and no acute pathology.   • Statin intolerance 3/28/2017    Intolerance to multiple statins.   • Status post catheter ablation of atrial flutter, 2002--successful catheter  ablation for persistent atrial flutter. 2003 2003--catheter ablation for persistent atrial flutter was successful.   • Vitamin D deficiency 3/14/2016         Past Surgical History:   Procedure Laterality Date   • CARDIAC ABLATION  07/02/2003 07/02/2003--catheter ablation for persistent atrial flutter was successful.   • CARDIAC PACEMAKER PLACEMENT  12/18/2000 12/18/2000--dual-chamber pacemaker placement for sick sinus syndrome, atrial flutter, AV block that occurred after her bypass surgery.   • CARPAL TUNNEL RELEASE Right 01/2010 January 2010--status post right carpal tunnel release. No surgical treatment on left.   • CORONARY ARTERY BYPASS GRAFT  12/2000 December 2000--coronary artery bypass x 5   • HX OVARIAN CYSTECTOMY  Remote    Remote ovarian cyst excision.   • INCISIONAL BREAST BIOPSY  Remote    Benign   • PACEMAKER REPLACEMENT  10/26/2006    10/26/2006--status post pacemaker replacement. Sakti3 EnRhythm [id number]    • PACEMAKER REPLACEMENT  11/30/2015 11/30/2015--pacemaker generator change.         Allergies   Allergen Reactions   • Atorvastatin    • Levothyroxine Sodium    • Other      Seafood   • Penicillins            Current Outpatient Medications:   •  ALPRAZolam (Xanax) 0.5 MG tablet, Take 1/2-1 p.o. nightly for nocturnal anxiety and insomnia., Disp: 30 tablet, Rfl: 1  •  apixaban (Eliquis) 2.5 MG tablet tablet, TAKE ONE TABLET BY MOUTH EVERY 12 HOURS, Disp: 60 tablet, Rfl: 0  •  Cholecalciferol (VITAMIN D) 1000 UNITS tablet, Take 1 tablet by mouth daily., Disp: , Rfl:   •  clotrimazole-betamethasone (LOTRISONE) 1-0.05 % cream, Apply to affected areas 3 times daily as directed, Disp: 45 g, Rfl: 2  •  Coenzyme Q10 (COQ10) 400 MG capsule, Take 1 capsule by mouth daily. With food, Disp: , Rfl:   •  doxazosin (CARDURA) 2 MG tablet, Take 1 p.o. twice daily for high blood pressure, Disp: 180 tablet, Rfl: 1  •  levothyroxine (Synthroid) 50 MCG tablet, Take 1 p.o. daily for low thyroid,  "Disp: 90 tablet, Rfl: 1  •  metoprolol succinate XL (TOPROL-XL) 50 MG 24 hr tablet, TAKE ONE TABLET BY MOUTH EVERY MORNING FOR HIGH BLOOD PRESSURE AND HEART, Disp: 90 tablet, Rfl: 1  •  ondansetron (Zofran) 4 MG tablet, Take 1 p.o. every 6 hours as needed nausea, Disp: 30 tablet, Rfl: 0  •  Red Yeast Rice 600 MG tablet, Take 2 tablets by mouth every day., Disp: , Rfl:   •  valsartan-hydrochlorothiazide (DIOVAN-HCT) 160-12.5 MG per tablet, TAKE ONE TABLET BY MOUTH EVERY MORNING FOR HIGH BLOOD PRESSURE, Disp: 90 tablet, Rfl: 1      Family History   Problem Relation Age of Onset   • Hyperlipidemia Mother    • Breast cancer Sister    • Diabetes type II Sister          Social History     Socioeconomic History   • Marital status:      Spouse name: Not on file   • Number of children: 2   • Years of education: Not on file   • Highest education level: 12th grade   Tobacco Use   • Smoking status: Never Smoker   • Smokeless tobacco: Never Used   Vaping Use   • Vaping Use: Never used   Substance and Sexual Activity   • Alcohol use: No   • Drug use: No   • Sexual activity: Not Currently     Partners: Male         Vitals:    05/21/21 1348   BP: 146/90   Pulse: 73   Resp: 16   Temp: 98 °F (36.7 °C)   SpO2: 96%   Weight: 43.1 kg (95 lb)   Height: 152.4 cm (60\")        Body mass index is 18.55 kg/m².      Physical Exam:    General: Alert and oriented x 3.  No acute distress.  Normal affect.  HEENT: Pupils equal, round, reactive to light; extraocular movements intact; sclerae nonicteric; pharynx, ear canals and TMs normal.  Neck: Without JVD, thyromegaly, bruit, or adenopathy.  Lungs: Clear to auscultation in all fields.  Heart: Regular rate and rhythm without murmur, rub, gallop, or click.  Abdomen: Soft, nontender, without hepatosplenomegaly or hernia.  Bowel sounds normal.  : Deferred.  Rectal: Deferred.  Extremities: Without clubbing, cyanosis, edema, or pulse deficit.  There are chronic venous insufficiency changes of " both legs, left greater than right.  No sign of cellulitis.  No significant edema.  Neurologic: Intact without focal deficit.  Patient is nonambulatory and in a wheelchair at the present time.  She can ambulate.  Skin: Without significant lesion.  Musculoskeletal: Unremarkable.    Lab/other results:    NMR reveals a total cholesterol of 220.  Triglycerides 149.  LDL particle number mildly elevated 1613.  Small LDL particle number excellent 406.  HDL particle number low at 27.6.  CMP normal except sodium low at 131.  Hemoglobin A1c 5.87.  CBC normal except hemoglobin low at 11.5 but hematocrit is normal at 35.7.  Vitamin D is normal at 42.  Thyroid function test normal.    POC urinalysis reveals pyuria, microscopic hematuria, nitrite positive, 1+ protein.    Assessment/Plan:     Diagnosis Plan   1. Impaired fasting glucose  CBC (No Diff)    Comprehensive Metabolic Panel    Hemoglobin A1c   2. Hyperlipidemia  NMR LipoProfile   3. Statin intolerance     4. Benign essential hypertension     5. Primary hypothyroidism  TSH    T4, Free    T3, Free   6. Occlusive coronary artery disease, 12/11/2000--status post 5 vessel CABG     7. History of atrial flutter, 2003--successful catheter ablation for persistent atrial flutter.     8. History of permanent cardiac PM, 2000--dual-chamber PM for SSS, A flutter, AVB after CABG. 2006--PM replacement.  Medtronic EnRhythm [id number]. 11/30/2015--PM generator change.     9. Mitral valve annular calcification     10. Permanent atrial fibrillation (CMS/HCC)     11. Chronic anticoagulation, Eliquis for permanent atrial fibrillation     12. Bilateral lower extremity edema     13. Generalized osteoarthritis of multiple sites     14. Osteopenia of multiple sites     15. Hyponatremia     16. Dysuria  POCT urinalysis dipstick, automated    Urine Culture - , Urine, Clean Catch   17. Bad odor of urine     18. Therapeutic drug monitoring  CBC (No Diff)   19. Vitamin D deficiency  Vitamin D 25 Hydroxy      Patient has a history of very mild impaired fasting glucose that does not require medication.  She has hyperlipidemia but cannot tolerate statins.  She is able to tolerate small doses of red yeast rice and we are doing the best we can regarding her cholesterol status.  Her blood pressure seems to be controlled.  Her thyroid is in therapeutic range.  She has coronary artery disease which is stable and also has a permanent cardiac pacemaker.  She does have permanent atrial fibrillation and is on chronic anticoagulation with Eliquis without signs or symptoms of bleeding.  Lower extremity edema well controlled.  She has generalized osteoarthritis of multiple sites and her symptoms are tolerable.  She also has osteopenia and we have elected to discontinue DEXA scan screening.  She has persistent hyponatremia which is mild and stable.  This is likely due to her medications but I do not feel it is bad enough to warrant any changes.  She has new complaints of dysuria and odiferous urine and it appears she has a urinary tract infection.    Plan is as follows: Cipro 500 mg p.o. twice daily x5 days.  Fluconazole 200 mg 1 p.o. now and 1 p.o. daily x3 days after completion of antibiotics.  No changes in current medical regimen.  Patient will follow-up in about 4 to 5 months with lab prior and this will also be subsequent Medicare wellness visit.  Otherwise follow-up as needed.  Urine culture sent.    Procedures

## 2023-05-12 NOTE — PROGRESS NOTES
03/28/2017    Patient Information  Lora Serrano                                                                                          [id number] [facility] [address]      7/15/1926  [phone]      Chief Complaint:     Follow-up impaired fasting glucose, hypothyroidism, coronary artery disease, osteopenia, hyperlipidemia, hypertension, generalized osteoarthritis, particularly her knees, history of atrial flutter, history of complete AV block, status post catheter ablation of atrial flutter and permanent pacemaker placement, moderate mitral regurgitation, vitamin D deficiency.  No new acute complaints.    History of Present Illness:    Patient with a history of medical problems as outlined in the chief complaint that have been fairly stable over the past year.  She presents today for a routine follow-up with lab to assess her chronic medical issues.  She is tolerating her medications well and has no new acute complaints.  Her past medical history extensively reviewed and updated where necessary including her health maintenance parameters.  This reveals she is up-to-date except for her annual Medicare wellness visit that we can do later on this year.  Note that patient prefers not to have a colonoscopy, mammogram, or DEXA scan and given the overall clinical situation, I don't think this is unreasonable choice.    Review of Systems   Constitution: Negative.   HENT: Negative.    Eyes: Negative.    Cardiovascular: Negative.    Respiratory: Negative.    Endocrine: Negative.    Hematologic/Lymphatic: Negative.    Skin: Negative.    Musculoskeletal: Negative.    Gastrointestinal: Negative.    Genitourinary: Negative.    Neurological: Negative.    Psychiatric/Behavioral: Negative.    Allergic/Immunologic: Negative.        Active Problems:    Patient Active Problem List   Diagnosis   • Occlusive coronary artery disease, 12/11/2000--status post 5 vessel CABG   • Subclinical hypothyroidism   •  Universal Instructions Sheet      Geovanny,    Your procedure/Surgery is scheduled at [facility]:  [address]   on _5/22_ at _9:45 AM_, please plan to arrive at _7:45 AM_.    Please do NOT follow the arrival time on your live well sridhar, it is incorrect and doing so may cause your procedure to be cancelled (we are working on the issue).    Free  service is available Monday through Friday starting at 8am until 3:30pm     If you have received the COVID vaccine, bring your vaccination card with you the day of your procedure.    Please bring your insurance card, 's license and a list of your medications, vitamins and supplements to the hospital, including the last dosage and time taken.    ON DAY OF PROCEDURE, CHECK IN AT:  Day Surgery - located on the 2nd floor. ([phone])      Other Testing needed:  Bmp and t&s- please do testing on 5/19 between 10 am - 6pm      At [facility]:  [address]   [phone]      Diet:  DO NOT eat any solid food after midnight prior to your procedure/surgery. This includes hard candy, gum and mints. Eating after midnight could cause a delay or cancellation of your procedure/surgery.    You may have clear liquids up until 4 hours before your procedure/surgery. This includes liquids that do not contain pulp, dairy or cloudiness. Clear liquids include beverages like water, apple juice, sprite/7Up, broth, Jello, black coffee, and tea.     **Please  STAAR Kit at [facility] Information Desk AND follow these pre-procedure instructions:     - Shower with Hibiclens the night before and morning of your procedure/surgery     - Use a new tooth brush starting the night before your procedure/surgery     - Drink the included 10 oz bottle of Ensure Clear Pre-Surgical drink, 3 hours prior to surgery. If you are diabetic DO NOT TAKE THIS DRINK, instead you will need to substitute with 10oz of water.   Medication  Impaired fasting glucose   • Onychomycosis of fingernails   • Osteopenia, 01/31/2011--lumbar spine -2.2, left hip -1.0.   • Vitamin D deficiency   • Hyperlipidemia   • Benign essential hypertension   • Generalized osteoarthritis of multiple sites   • Therapeutic drug monitoring   • History of atrial flutter, 2003--successful catheter ablation for persistent atrial flutter.   • History of complete AV block, 2000--dual-chamber PM for SSS, A flutter, AVB after CABG. 2006--PM replacement.  Medtronic EnRhythm [id number]. 11/30/2015--PM generator change.   • Moderate mitral regurgitation, 02/18/2012--moderate MR, EF 58%.   • Status post catheter ablation of atrial flutter, 2002--successful catheter ablation for persistent atrial flutter.   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 2006--PM replacement.  Medtronic EnRhythm [id number]. 11/30/2015--PM generator change.   • Primary osteoarthritis of both knees   • Statin intolerance         Past Medical History:   Diagnosis Date   • History of acute bronchitis 02/09/2015 02/09/2015--patient presents with a 5 or 6 day history of chest congestion, cough productive of yellow/green phlegm that has cleared some over the past day or so. No significant worsening shortness of breath. She has had some chills but no documented fever. Not much in the way of ENT symptoms. Examination reveals mild bilateral rhonchi. No definite areas of consolidation. Levaquin 500 mg by mouth    • History of acute pharyngitis 10/16/2015    10/16/2015--patient presents with approximately 5 day history of sore throat, posterior nasal drainage, cough productive of scant clear phlegm.  No fever, chills, shortness of breath or other systemic signs or symptoms.  She has fatigue but no myalgias.  Examination reveals boggy nasal mucosa.  No tenderness over the sinuses.  Her pharynx is erythematous but there is no exudate.  Neck without nano   • History of Acute  Instructions:    If you use a rescue inhaler for asthma, please bring it with you and use it if needed on the day of your procedure/surgery.    Stop ALL vitamins and supplements 7 days prior to procedure, including Fish Oil, Vitamins A,B,C,D,E, Preservision, and PrenatalVitamins.    STOP taking non-steroidal, anti-inflammatory drugs, otherwise known as NSAIDS per your surgeon's instructions.  Examples of NSAIDS are Aleve, ibuprofen, Motrin, Advil and Naproxen.      If you are diabetic and take pills for diabetes, DO NOT take glipizide, metformin the morning of your procedure/surgery.    If you take METFORMIN, DO NOT take for 12 hours prior to your procedure/surgery.    Stop empagliflozine 3 days prior to your procedure    Take only the following medications before coming to the hospital. If they are pills, you may take them with a small sip of water: _gabapentin, verapmil_    If there are any changes in your physical condition, such as cold, fever or infection, or you have specific questions regarding your procedure/surgery, please contact your surgeon directly.    For questions regarding these instructions, contact Pre-Admission Testing at [phone], Monday through Friday, 8 am - 4 pm.    On the day of your procedure, please bring in a copy of your complete up-to-date medication list, I.D., and insurance card.    Important Information:  Hibiclens Required      Please bathe or shower the evening before and morning of your procedure/surgery.  Avoid using lotions, creams, powders, make-up and deodorant on your clean skin.    Remove all jewelry, earrings, body piercings and contact lenses before coming to the hospital.    You may wear you glasses, hearing aids and/or dentures to the hospital. Please bring a case as they will need to be removed prior to the procedure/surgery.    If you are a smoker, please DO NOT SMOKE FOR 12 HOURS PRIOR  to your procedure/surgery.  This includes cigarettes, marijuana and  upper respiratory infection 10/16/2015    10/16/2015--patient presents with approximately 5 day history of sore throat, posterior nasal drainage, cough productive of scant clear phlegm.  No fever, chills, shortness of breath or other systemic signs or symptoms.  She has fatigue but no myalgias.  Examination reveals boggy nasal mucosa.  No tenderness over the sinuses.  Her pharynx is erythematous but there is no exudate.  Neck without nano   • History of anemia 03/11/2015 03/11/2015--CBC is normal with a hemoglobin of 12.2, hematocrit normal at 37.0.   04/25/2014--homocystine and methylmalonic acid are normal. Iron studies are normal, except iron saturation is low at 15%. Observation.   04/14/2014--CBC revealed a low hemoglobin of 11.5, hematocrit low at 34.7. Homocystine, methylmalonic acid, iron studies ordered. Patient is to followup on the phone for the results   • History of atrial flutter, 2003--successful catheter ablation for persistent atrial flutter. 2003 2003--catheter ablation for persistent atrial flutter was successful.   • History of bone density study 01/31/2011 01/31/2011--DEXA scan revealed LS spine T score -2.2, left hip -1.0. Now osteopenia. 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   • History of carotid Doppler/vascular screen. 01/22/2008 01/22/2008--vascular screen was negative for carotid artery plaque, abdominal aorta normal, no PAD.   • History of carpal tunnel syndrome, bilateral 11/19/2009 11/19/2009--nerve conduction study of the upper extremities revealed bilateral carpal tunnel syndrome. January 2010--status post right carpal tunnel release. No surgical treatment on left.   • History of chest x-ray 9/28/2016 09/06/2016--chest x-ray performed by the urgent care for complaints of cough and shortness of breath.  This was compared to previous x-ray 02/15/2014 and is unchanged and reveals no acute disease.   • History of complete AV block, 10/26/2006--permanent  vaping.    If your doctor mentioned that you may might spend the night, please bring a small overnight bag with toiletries and any personal items you may need.  If you use a CPAP machine and will be spending the night, bring your machine, tubing and mask.    Due to anesthesia, you will not be able to operate power tools, drink alcohol, or drive a vehicle for 24 hours after surgery.  You will not be able to walk home, use public transportation or take a cab, Uber or Lyft home.        *If you are not being admitted after your procedure, you must have a responsible adult (18 or older) to drive you home and it is HIGHLY recommended that you have a responsible adult with you overnight at home following the procedure/surgery.*. IF YOU ARE UNABLE TO HAVE SOMEONE WITH YOU OVERNIGHT AFTER SURGERY, PLEASE NOTIFY YOUR SURGEON.       REQUIRED INSTRUCTIONS PRESURGERY SHOWER/BATH   DO NOT: shave any part of the surgical area for at least 2 days prior to surgery.   Tiny skin cuts and abrasions from shaving in the surgical area can increase risk of infection.   DO NOT use lotions on skin before surgery.   DO use a fresh clean towel after each shower.   DO dress in clean clothes after each of your showers.   DO shower or bathe your whole body, including hair, on the night before surgery.   DO shower again on the morning of surgery if time permits (do not wash hair during this shower)     GENERAL REQUIRED SHOWER/BATH INSTRUCTIONS (SOAP AND WATER)   1. Use warm, but not hot, water for shower/bath       NOTE: Showers are recommended. Showers are more effective than a bath for pre-operative skin cleaning   2. Use regular soap to remove dirt and germs from your skin   3. Do not scrub so vigorously that skin is abraded or scratched   4. Use clean towels to gently dry your skin after each shower/bath          Instructions for Preoperative Skin Cleansing Before Planned Surgical Procedure at [facility]   For your safety and to help  pacemaker replacement.  12/18/2000--dual-chamber pacemaker placement for sick sinus syndrome, atrial flutter, AV block after CABG. 12/18/2000 12/18/2000--dual-chamber pacemaker placement for sick sinus syndrome, atrial flutter, AV block that occurred after her bypass surgery. 10/26/2006--status post pacemaker replacement. Medtronic EnRhythm [id number]   • History of drug rash, 01/11/2014--name brand Synthroid 01/11/2014 01/11/2014--patient presented with a diffuse erythematous rash that was very pruritic. Examination consistent with a drug reaction/allergic dermatitis. Believed to be secondary to name brand Synthroid. This was discontinued and patient treated with a Medrol Dosepak.   • History of echocardiogram 01/18/2012 01/18/2012 revealed ejection fraction of 58%, moderate mitral regurgitation.   • History of mammogram 09/10/2003    09/10/2003--normal mammogram.   • History of osteoporosis, June 2001 osteoporosis of the LS spine.  2011--improved to osteopenia only. 2011,2001 01/31/2011--DEXA scan  revealed LS spine T score -2.2, left hip -1.0.  Now osteopenia.   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   • History of pneumococcal vaccination 03/16/2015 03/16/2015--Prevnar 13 given. Patient's initial pneumococcal vaccination was given after the age of 65 and therefore no further pneumococcal immunization is required. 12/14/2007--Pneumovax given. 12/10/2002--pneumococcal vaccination given.   • History of Zostavax administration 1/17/2008 01/17/2008--Zostavax given at the local drug store.   • Questionable History of polymyalgia rheumatica --    This diagnosis somewhat suspect   • Status post catheter ablation of atrial flutter, 2002--successful catheter ablation for persistent atrial flutter. 2003 2003--catheter ablation for persistent atrial flutter was successful.         Past Surgical History:   Procedure Laterality Date   • CARDIAC ABLATION  07/02/2003 07/02/2003--catheter  prevent infection, please follow this:    REQUIRED additional antiseptic shower/bath.   ANTISEPTIC SHOWER/BATH PROCESS     e.g. HIBICLENS* or alternative chlorhexidine (CHG) containing skin antisepsis solutions   Chlorhexidine gluconate (2% - 4%) skin cleansing solutions are wash solutions used to kill germs on the skin. They are available over the counter (no prescription needed) at minimal cost at most pharmacies and drug stores.   *WHEN USING HIBICLENS* MAY CAUSE SHOWER AND BATHTUB SURFACES TO BECOME SLIPPERY*   Instructions for \"whole body\" shower or bath with chlorhexidine gluconate (CHG) antiseptic solution   1. After your required shower or bath, rinse thoroughly.   2. Step away from the stream of water, and thoroughly apply enough solution to cover skin below the neck (not head or face), within skin folds, and in hard to reach areas (e.g. the back of the knees, inside elbows, etc). Use your hands to apply and rub onto your skin without using a washcloth. Note: Solution will NOT suds up when applying   3. Rinse thoroughly.   4. Dry off with a clean towel.   5. Dress in clean clothes.   • DO NOT use on the head or face DO NOT use in the genital area   • DO NOT use on non-healing wounds DO NOT use in eyes, ears or mouth.   Making sure that your skin is clean and ready for surgery at home is very important.   You can reduce the risk of infection by following the required instructions above.     Emailed   ablation for persistent atrial flutter was successful.   • CARDIAC PACEMAKER PLACEMENT  12/18/2000 12/18/2000--dual-chamber pacemaker placement for sick sinus syndrome, atrial flutter, AV block that occurred after her bypass surgery.   • CARPAL TUNNEL RELEASE Right 01/2010 January 2010--status post right carpal tunnel release. No surgical treatment on left.   • CORONARY ARTERY BYPASS GRAFT  12/2000 December 2000--coronary artery bypass x 5   • HX OVARIAN CYSTECTOMY  Remote    Remote ovarian cyst excision.   • INCISIONAL BREAST BIOPSY  Remote    Benign   • PACEMAKER REPLACEMENT  10/26/2006    10/26/2006--status post pacemaker replacement. Medtronic EnRhythm [id number]    • PACEMAKER REPLACEMENT  11/30/2015 11/30/2015--pacemaker generator change.         Allergies   Allergen Reactions   • Atorvastatin    • Levothyroxine Sodium    • Other      Seafood   • Penicillins            Current Outpatient Prescriptions:   •  aspirin 81 MG tablet, Take 1 tablet by mouth daily., Disp: , Rfl:   •  Cholecalciferol (VITAMIN D) 1000 UNITS tablet, Take 1 tablet by mouth daily., Disp: , Rfl:   •  clotrimazole-betamethasone (LOTRISONE) cream, Apply  topically 3 (three) times a day., Disp: , Rfl:   •  Coenzyme Q10 (COQ10) 400 MG capsule, Take 1 capsule by mouth daily. With food, Disp: , Rfl:   •  doxazosin (CARDURA) 4 MG tablet, TAKE ONE TABLET BY MOUTH EVERY MORNING, Disp: 90 tablet, Rfl: 0  •  metoprolol succinate XL (TOPROL-XL) 50 MG 24 hr tablet, TAKE ONE TABLET BY MOUTH DAILY, Disp: 90 tablet, Rfl: 0  •  olmesartan-hydrochlorothiazide (BENICAR HCT) 40-25 MG per tablet, TAKE ONE TABLET BY MOUTH DAILY FOR BLOOD PRESSURE, Disp: 90 tablet, Rfl: 0  •  Red Yeast Rice 600 MG tablet, Take 2 tablets by mouth every day., Disp: , Rfl:       Family History   Problem Relation Age of Onset   • Hyperlipidemia Mother    • Breast cancer Sister    • Diabetes type II Sister          Social History     Social History   • Marital status:  "     Spouse name: N/A   • Number of children: N/A   • Years of education: N/A     Occupational History   • Retired      Social History Main Topics   • Smoking status: Never Smoker   • Smokeless tobacco: Never Used   • Alcohol use No   • Drug use: No   • Sexual activity: Not Currently     Partners: Male     Other Topics Concern   • Not on file     Social History Narrative         Vitals:    03/28/17 0933   BP: 142/60   Pulse: 76   SpO2: 97%   Weight: 121 lb (54.9 kg)   Height: 59\" (149.9 cm)          Physical Exam:    General: Alert and oriented x 3.  No acute distress.  Normal affect.  HEENT: Pupils equal, round, reactive to light; extraocular movements intact; sclerae nonicteric; pharynx, ear canals and TMs normal.  Neck: Without JVD, thyromegaly, bruit, or adenopathy.  Lungs: Clear to auscultation in all fields.  Heart: Regular rate and rhythm without murmur, rub, gallop, or click.  Abdomen: Soft, nontender, without hepatosplenomegaly or hernia.  Bowel sounds normal.  : Deferred.  Rectal: Deferred.  Extremities: Without clubbing, cyanosis, edema, or pulse deficit.  Neurologic: Intact without focal deficit.  Normal station and gait observed during ingress and egress from the examination room.  Skin: Without significant lesion.  Musculoskeletal: Unremarkable.      Lab/other results:    NMR reveals total cholesterol 243.  LDL particle number elevated at 1704.  Small LDL particle number elevated at 763.  HDL particle number is normal at 36.8.  CMP normal except sodium slightly low at 135.  CBC is essentially normal.  Hemoglobin A1c 6.0.  Thyroid function tests are normal.    Assessment/Plan:     Diagnosis Plan   1. Impaired fasting glucose     2. Subclinical hypothyroidism     3. Occlusive coronary artery disease, 12/11/2000--status post 5 vessel CABG     4. Osteopenia, 01/31/2011--lumbar spine -2.2, left hip -1.0.     5. Hyperlipidemia, unspecified hyperlipidemia type     6. Benign essential hypertension   "   7. Generalized osteoarthritis of multiple sites     8. Primary osteoarthritis of both knees     9. History of atrial flutter, 2003--successful catheter ablation for persistent atrial flutter.     10. History of complete AV block, 2000--dual-chamber PM for SSS, A flutter, AVB after CABG. 2006--PM replacement.  Medtronic EnRhythm [id number]. 11/30/2015--PM generator change.     11. Status post catheter ablation of atrial flutter, 2002--successful catheter ablation for persistent atrial flutter.     12. History of permanent cardiac PM, 2000--dual-chamber PM for SSS, A flutter, AVB after CABG. 2006--PM replacement.  Medtronic EnRhythm [id number]. 11/30/2015--PM generator change.     13. Moderate mitral regurgitation, 02/18/2012--moderate MR, EF 58%.     14. Vitamin D deficiency     15. Therapeutic drug monitoring     16. Statin intolerance       Patient has very mild impaired fasting glucose that does not require medication.  She has questionable subclinical hypothyroidism and her thyroid function tests today are fine without medication.  She had difficulty tolerating the medication anyway.  She has occlusive coronary artery disease which is stable.  She has osteopenia and prefers not to have a repeat DEXA scan.  She has hyperlipidemia but is statin intolerant.  She is tolerating one red yeast rice per day.  I've asked her to consider increasing this to 2 and possibly 3 per day.  Her blood pressure is adequately controlled given the overall clinical picture.  She has generalized osteoarthritis and this typically involves her knees.  She has a history of atrial flutter and had a successful catheter ablation.  She subsequently had complete AV block and has a pacemaker.  This is being monitored closely by the cardiologist.  She has moderate mitral regurgitation that has been stable.  Vitamin D therapeutic.    Plan is as follows: Patient should try to increase the red yeast rice to at least 2 end perhaps 3 per day.  I will have  her follow-up in 6 months with lab prior or follow-up as needed.            Procedures